# Patient Record
Sex: MALE | Race: BLACK OR AFRICAN AMERICAN | Employment: UNEMPLOYED | URBAN - METROPOLITAN AREA
[De-identification: names, ages, dates, MRNs, and addresses within clinical notes are randomized per-mention and may not be internally consistent; named-entity substitution may affect disease eponyms.]

---

## 2017-01-05 ENCOUNTER — TELEPHONE (OUTPATIENT)
Dept: INTERNAL MEDICINE CLINIC | Age: 57
End: 2017-01-05

## 2017-01-05 NOTE — TELEPHONE ENCOUNTER
Chief Complaint   Patient presents with    Appointment     patient reached and informed of upcoming follow up with Dr Nicole Fothergill on 01-10-17 at 8:30 am and to have his LABS Drawn 3-5 business days prior to the appointment to discuss results     01-05-17 at 10:43 am: Patient reached and states he had a death in the family and will need to reschedule his appointment, he will have his labs done prior to his rescheduled appointment. Patient was given our new office contact information/phone number, and understands all.

## 2017-01-20 ENCOUNTER — TELEPHONE (OUTPATIENT)
Dept: INTERNAL MEDICINE CLINIC | Age: 57
End: 2017-01-20

## 2017-01-20 ENCOUNTER — OFFICE VISIT (OUTPATIENT)
Dept: INTERNAL MEDICINE CLINIC | Age: 57
End: 2017-01-20

## 2017-01-20 VITALS
OXYGEN SATURATION: 99 % | WEIGHT: 257 LBS | SYSTOLIC BLOOD PRESSURE: 138 MMHG | RESPIRATION RATE: 20 BRPM | HEART RATE: 79 BPM | BODY MASS INDEX: 32.98 KG/M2 | DIASTOLIC BLOOD PRESSURE: 82 MMHG | HEIGHT: 74 IN | TEMPERATURE: 98.1 F

## 2017-01-20 DIAGNOSIS — E04.9 GOITER: ICD-10-CM

## 2017-01-20 DIAGNOSIS — B20 HIV (HUMAN IMMUNODEFICIENCY VIRUS INFECTION) (HCC): Primary | ICD-10-CM

## 2017-01-20 DIAGNOSIS — E78.5 HYPERLIPIDEMIA, UNSPECIFIED HYPERLIPIDEMIA TYPE: ICD-10-CM

## 2017-01-20 DIAGNOSIS — H40.9 GLAUCOMA, UNSPECIFIED GLAUCOMA, UNSPECIFIED LATERALITY: ICD-10-CM

## 2017-01-20 DIAGNOSIS — I10 ESSENTIAL HYPERTENSION: ICD-10-CM

## 2017-01-20 NOTE — MR AVS SNAPSHOT
Visit Information Date & Time Provider Department Dept. Phone Encounter #  
 1/20/2017  8:00 AM Stewart Burgess MD Internist of [address] [id number] Follow-up Instructions Return in about 4 months (around 5/20/2017) for BP check. Your Appointments 5/23/2017  8:00 AM  
Office Visit with Stewart Burgess MD  
Internist of [facility] [address] [facility]) Appt Note: 4 mth f/u  
 [address] [facility], [facility] [id number] [address]  
  
   
 [address] [facility] Upcoming Health Maintenance Date Due  
 MEDICARE YEARLY EXAM 8/8/1976 INFLUENZA AGE 9 TO ADULT 8/1/2016 COLONOSCOPY 8/12/2019 DTaP/Tdap/Td series (2 - Td) 10/17/2021 Allergies as of 1/20/2017  Review Complete On: 1/20/2017 By: Stewart Burgess MD  
 No Known Allergies Current Immunizations  Never Reviewed Name Date Influenza Vaccine 9/3/2014, 11/8/2012 Pneumococcal Conjugate (PCV-13) 6/10/2013 Pneumococcal Polysaccharide (PPSV-23) 1/22/2009 Pneumococcal Vaccine (Unspecified Type) 3/19/2014 Tdap 10/17/2011 Not reviewed this visit Vitals BP Pulse Temp Resp Height(growth percentile) Weight(growth percentile) 138/82 79 98.1 °F (36.7 °C) 20 6' 2\" (1.88 m) 257 lb (116.6 kg) SpO2 BMI Smoking Status 99% 33 kg/m2 Former Smoker BMI and BSA Data Body Mass Index Body Surface Area  
 33 kg/m 2 2.47 m 2 Preferred Pharmacy Pharmacy Name Phone [address] [facility] [address] [facility] [address] [phone] Your Updated Medication List  
  
   
This list is accurate as of: 1/20/17  8:50 AM.  Always use your most recent med list.  
  
  
  
  
 ascorbic acid (vitamin C) 500 mg tablet Commonly known as:  VITAMIN C Take 500 mg by mouth daily. brimonidine 0.1 % ophthalmic solution Commonly known as:  ALPHAGAN P  
every eight (8) hours. didanosine 400 mg capsule Commonly known as:  VIDEX EC Take 400 mg by mouth daily. ergocalciferol 50,000 unit capsule Commonly known as:  ERGOCALCIFEROL Take 50,000 Units by mouth every seven (7) days. ketoconazole 2 % shampoo Commonly known as:  NIZORAL Apply  to affected area daily as needed for Itching. lisinopril 20 mg tablet Commonly known as:  Shannon Pinch Take 1 Tab by mouth daily. mometasone 50 mcg/actuation nasal spray Commonly known as:  NASONEX  
2 Sprays daily. multivitamin tablet Commonly known as:  ONE A DAY Take 1 Tab by mouth daily. omega-3 fatty acids-vitamin e 1,000 mg Cap Take 1 Cap by mouth. OTHER Triumebq  
  
 polyethylene glycol 17 gram/dose powder Commonly known as:  Katie Floor TAKE AS DIRECTED BY OFFICE  
  
 rosuvastatin 5 mg tablet Commonly known as:  CRESTOR Take 5 mg by mouth nightly. tiZANidine 4 mg tablet Commonly known as:  Bunker Homans TAKE 1 TABLET THREE TIMES DAILY AS NEEDED FOR  MUSCLE  SPASM  
  
 triamcinolone acetonide 0.025 % lotion Apply  to affected area two (2) times a day. vitamin E 400 unit capsule Commonly known as:  [address] Take 400 Units by mouth daily. Follow-up Instructions Return in about 4 months (around 5/20/2017) for BP check. Patient Instructions Learning About Taking Medicine to Prevent HIV Infections Introduction HIV (human immunodeficiency virus) is a virus that attacks the immune system, the body's natural defense system. Without a strong immune system, the body has trouble fighting off disease. HIV is the virus that causes AIDS. Two common ways to get HIV are: 
· Having unprotected sex with someone who has HIV. · Sharing needles with someone who has HIV. These things put you at high risk for getting HIV.  If you are at risk, you and your doctor can decide if you can take medicines that may lower your risk. Taking these medicines is called pre-exposure prophylaxis (PrEP). How does PrEP work? PrEP can help prevent an HIV infection from taking hold and spreading in your body. Two medicines are combined in one pill called Truvada. You must take it on schedule for it to help protect you from HIV. PrEP works best if you take the medicine every day. It doesn't work well if you don't follow the daily schedule. Do not share your medicine with other people. You will have regular visits with your doctor. He or she will check to see how you are doing while taking the medicine. You'll be tested for HIV. Your doctor may also talk to you about other steps you can take to avoid HIV infection. These include practicing safer sex and not injecting illegal drugs with shared needles. What else should you know about PrEP? PrEP does not remove all risk of getting HIV. While you take the medicine, avoid risky actions like having unprotected sex and sharing needles. PrEP can help you have a baby safely when your partner has an HIV infection. It can help prevent the infection from spreading to you or your baby. Your doctor can discuss this and other options with you. If you are infected with HIV, your doctor may give you Truvada along with other medicine to treat HIV. Be safe with medicines. Take your medicines exactly as prescribed. Call your doctor if you think you are having a problem with your medicine. You may be able to pay less for PrEP medicines. Many health insurance plans cover the cost of PrEP. There are programs that provide PrEP for free or at a lower cost for people who need help paying for it. Follow-up care is a key part of your treatment and safety. Be sure to make and go to all appointments, and call your doctor if you are having problems. It's also a good idea to know your test results and keep a list of the medicines you take. Where can you learn more? Go to http://adela-mily.info/. Enter A222 in the search box to learn more about \"Learning About Taking Medicine to Prevent HIV Infections. \" Current as of: May 24, 2016 Content Version: 11.1 © 1011-1958 [employer]. Care instructions adapted under license by [employer] (which disclaims liability or warranty for this information). If you have questions about a medical condition or this instruction, always ask your healthcare professional. [address] any warranty or liability for your use of this information. Introducing [facility]! Argelia Garza introduces [employer] patient portal. Now you can access parts of your medical record, email your doctor's office, and request medication refills online. 1. In your internet browser, go to [email or web address] 2. Click on the First Time User? Click Here link in the Sign In box. You will see the New Member Sign Up page. 3. Enter your [employer] Access Code exactly as it appears below. You will not need to use this code after youve completed the sign-up process. If you do not sign up before the expiration date, you must request a new code. · [employer] Access Code: [email or web address] Expires: 4/20/2017  7:51 AM 
 
4. Enter the last four digits of your Social Security Number (xxxx) and Date of Birth (mm/dd/yyyy) as indicated and click Submit. You will be taken to the next sign-up page. 5. Create a [employer] ID. This will be your [employer] login ID and cannot be changed, so think of one that is secure and easy to remember. 6. Create a [employer] password. You can change your password at any time. 7. Enter your Password Reset Question and Answer. This can be used at a later time if you forget your password. 8. Enter your e-mail address. You will receive e-mail notification when new information is available in [address] 9. Click Sign Up. You can now view and download portions of your medical record. 10. Click the Download Summary menu link to download a portable copy of your medical information. If you have questions, please visit the Frequently Asked Questions section of the [employer] website. Remember, [employer] is NOT to be used for urgent needs. For medical emergencies, dial 911. Now available from your iPhone and Android! Please provide this summary of care documentation to your next provider. Your primary care clinician is listed as Severa Ramus. If you have any questions after today's visit, please call [phone].

## 2017-01-20 NOTE — TELEPHONE ENCOUNTER
Call from [facility], pt needs updated referral for his glaucoma exam with Dr. Abner Sandoval, his appmnt is 01/23/17, Sedrick Kumar

## 2017-01-20 NOTE — PATIENT INSTRUCTIONS
Learning About Taking Medicine to Prevent HIV Infections  Introduction  HIV (human immunodeficiency virus) is a virus that attacks the immune system, the body's natural defense system. Without a strong immune system, the body has trouble fighting off disease. HIV is the virus that causes AIDS. Two common ways to get HIV are:  · Having unprotected sex with someone who has HIV. · Sharing needles with someone who has HIV. These things put you at high risk for getting HIV. If you are at risk, you and your doctor can decide if you can take medicines that may lower your risk. Taking these medicines is called pre-exposure prophylaxis (PrEP). How does PrEP work? PrEP can help prevent an HIV infection from taking hold and spreading in your body. Two medicines are combined in one pill called Truvada. You must take it on schedule for it to help protect you from HIV. PrEP works best if you take the medicine every day. It doesn't work well if you don't follow the daily schedule. Do not share your medicine with other people. You will have regular visits with your doctor. He or she will check to see how you are doing while taking the medicine. You'll be tested for HIV. Your doctor may also talk to you about other steps you can take to avoid HIV infection. These include practicing safer sex and not injecting illegal drugs with shared needles. What else should you know about PrEP? PrEP does not remove all risk of getting HIV. While you take the medicine, avoid risky actions like having unprotected sex and sharing needles. PrEP can help you have a baby safely when your partner has an HIV infection. It can help prevent the infection from spreading to you or your baby. Your doctor can discuss this and other options with you. If you are infected with HIV, your doctor may give you Truvada along with other medicine to treat HIV. Be safe with medicines. Take your medicines exactly as prescribed.  Call your doctor if you think you are having a problem with your medicine. You may be able to pay less for PrEP medicines. Many health insurance plans cover the cost of PrEP. There are programs that provide PrEP for free or at a lower cost for people who need help paying for it. Follow-up care is a key part of your treatment and safety. Be sure to make and go to all appointments, and call your doctor if you are having problems. It's also a good idea to know your test results and keep a list of the medicines you take. Where can you learn more? Go to http://adela-mily.info/. Enter D239 in the search box to learn more about \"Learning About Taking Medicine to Prevent HIV Infections. \"  Current as of: May 24, 2016  Content Version: 11.1  © 5624-2944 [employer]. Care instructions adapted under license by [employer] (which disclaims liability or warranty for this information). If you have questions about a medical condition or this instruction, always ask your healthcare professional. [address] any warranty or liability for your use of this information.

## 2017-01-20 NOTE — PROGRESS NOTES
INTERNISTS OF [facility]:  1/22/2017, MRN: [id number]      Colin Gaming is a 60 y.o. male and presents to clinic for Hypertension (follow up)    Subjective:   1. HTN:   - On lisinopril   - Present >4 months   - No adverse effects from rx    2. HLD:   - On Crestor   - No adverse effects from rx    3. Goiter:   - Followed by Endocrinology team   - Last TSH was elevated. Pt's thyroid ultrasound from 2016 shows tiny thyroid benign appearing nodules. Lab Results   Component Value Date/Time    TSH 4.68 09/15/2016 09:02 AM       4. HIV:   - Followed by [facility] ID team   - Not sexually active   - Preferred sexual partner: male   - On ART, no adverse effects   - Per pt hx, last viral load was undetectable   - Pt has very few people in his life that know his sexual orientation. He fears discrimination from family and friends, as well as fellow Christians at his [employer] if he should ever disclose his sexual orientation. He prays for peace and is active in his [employer]. 5. 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 eye      Patient Active Problem List    Diagnosis Date Noted    Abnormal TSH 01/22/2017    Glaucoma 01/20/2017    Goiter- with thyroid nodules per U/S 01/20/2017    Essential hypertension 01/18/2016    HLD (hyperlipidemia) 01/18/2016    [facility] 01/18/2016       Current Outpatient Prescriptions   Medication Sig Dispense Refill    tiZANidine (ZANAFLEX) 4 mg tablet TAKE 1 TABLET THREE TIMES DAILY AS NEEDED FOR  MUSCLE  SPASM 90 Tab 0    polyethylene glycol (MIRALAX) 17 gram/dose powder TAKE AS DIRECTED BY OFFICE 255 g 0    brimonidine (ALPHAGAN P) 0.1 % ophthalmic solution every eight (8) hours.  lisinopril (PRINIVIL, ZESTRIL) 20 mg tablet Take 1 Tab by mouth daily. 90 Tab 3    OTHER Triumebq      rosuvastatin (CRESTOR) 5 mg tablet Take 5 mg by mouth nightly.       mometasone (NASONEX) 50 mcg/actuation nasal spray 2 Sprays daily.  triamcinolone acetonide 0.025 % lotion Apply  to affected area two (2) times a day.  ketoconazole (NIZORAL) 2 % shampoo Apply  to affected area daily as needed for Itching.  didanosine (VIDEX EC) 400 mg capsule Take 400 mg by mouth daily.  omega-3 fatty acids-vitamin e 1,000 mg cap Take 1 Cap by mouth.  multivitamin (ONE A DAY) tablet Take 1 Tab by mouth daily.  ascorbic acid (VITAMIN C) 500 mg tablet Take 500 mg by mouth daily.  ergocalciferol (ERGOCALCIFEROL) 50,000 unit capsule Take 50,000 Units by mouth every seven (7) days.  vitamin E (AQUA GEMS) 400 unit capsule Take 400 Units by mouth daily. No Known Allergies    Past Medical History   Diagnosis Date    Arthritis      established with Dr. Elizabeth Kelley chiropractor    Autoimmune disease ([facility]     Blindness      left eye; and periperal vision is absent in [facility] Consultant Dr. Rafael Simon HIV (human immunodeficiency virus infection) [facility]) dx [facility]; viral loads undetectable for 13 yrs    Hypercholesterolemia     Hypertension        Past Surgical History   Procedure Laterality Date    Hx colonoscopy       2011    Hx heent       eye surg for glaucoma    Colonoscopy N/A 8/12/2016     COLONOSCOPY [facility] Pugh [facility] ENDOSCOPY       Family History   Problem Relation Age of Onset    Family history unknown: Yes       Social History   Substance Use Topics    Smoking status: Former Smoker     Quit date: 7/8/2006    Smokeless tobacco: Never Used      Comment: smoked for 30 years: 1/2 ppd.  Alcohol use No       ROS   Review of Systems   Constitutional: Negative for chills and fever. HENT: Negative for ear pain and sore throat. Eyes: Negative for pain. Respiratory: Negative for cough and shortness of breath. Cardiovascular: Negative for chest pain.    Gastrointestinal: Negative for abdominal pain. Genitourinary: Negative for dysuria. Musculoskeletal: Negative for joint pain and myalgias. Skin: Negative for rash. Neurological: Negative for tingling, focal weakness and headaches. Endo/Heme/Allergies: Negative for environmental allergies. Does not bruise/bleed easily. Psychiatric/Behavioral: Negative for substance abuse. Objective     Vitals:    01/20/17 0802   BP: 138/82   Pulse: 79   Resp: 20   Temp: 98.1 °F (36.7 °C)   SpO2: 99%   Weight: 257 lb (116.6 kg)   Height: 6' 2\" (1.88 m)   PainSc:   0 - No pain       Physical Exam   Constitutional: He is oriented to person, place, and time and well-developed, well-nourished, and in no distress. HENT:   Head: Normocephalic and atraumatic. Right Ear: External ear normal.   Left Ear: External ear normal.   Nose: Nose normal.   Mouth/Throat: Oropharynx is clear and moist. No oropharyngeal exudate. Clear TMs   Eyes: Conjunctivae and EOM are normal. Pupils are equal, round, and reactive to light. Right eye exhibits no discharge. Left eye exhibits no discharge. No scleral icterus. Neck: Neck supple. Cardiovascular: Normal rate, normal heart sounds and intact distal pulses. Pulmonary/Chest: Effort normal and breath sounds normal. No respiratory distress. He has no wheezes. He has no rales. Abdominal: Soft. Bowel sounds are normal. He exhibits no distension. There is no tenderness. There is no rebound and no guarding. Musculoskeletal: He exhibits no edema or tenderness (BUE are NTTP). Lymphadenopathy:     He has no cervical adenopathy. Neurological: He is alert and oriented to person, place, and time. He exhibits normal muscle tone. Skin: Skin is warm and dry. No erythema. Psychiatric: Affect normal.   Nursing note and vitals reviewed.       LABS   Data Review:   Lab Results   Component Value Date/Time    WBC 5.0 09/15/2016 09:02 AM    HGB 14.5 09/15/2016 09:02 AM    HCT 44.7 09/15/2016 09:02 AM    PLATELET 205 09/15/2016 09:02 AM    MCV 95 09/15/2016 09:02 AM       Lab Results   Component Value Date/Time    Sodium 141 09/15/2016 09:02 AM    Potassium 4.6 09/15/2016 09:02 AM    Chloride 99 09/15/2016 09:02 AM    CO2 29 09/15/2016 09:02 AM    Anion gap 13.0 09/15/2016 09:02 AM    Glucose 106 09/15/2016 09:02 AM    BUN 14 09/15/2016 09:02 AM    Creatinine 1.1 09/15/2016 09:02 AM    Calcium 9.5 09/15/2016 09:02 AM       Lab Results   Component Value Date/Time    Cholesterol, total 159 09/15/2016 09:02 AM    HDL Cholesterol 49 09/15/2016 09:02 AM    LDL, calculated 91 09/15/2016 09:02 AM    VLDL, calculated 18 09/15/2016 09:02 AM    Triglyceride 91 09/15/2016 09:02 AM       Lab Results   Component Value Date/Time    Hemoglobin A1c, External 6.1 09/04/2014 05:04 AM       Assessment/Plan:   1. HIV: stable. - C/w ART and f/u with [facility] ID team. Requesting their most recent records and lab work   - Encouraged PreP if pt should become sexually active. Counseled pt on PreP    2. HTN: BP is at goal.   - C/w lisinopril    3. Goiter: Stable. - Encouraged f/u with Endocrinology team    4. HLD: Stable. - C/w statin as prescribed. 5. Glaucoma:   - C/w regular eye exam and rx      Health Maintenance Due   Topic Date Due    MEDICARE YEARLY EXAM  08/08/1978    INFLUENZA AGE 9 TO ADULT  08/01/2016     Lab review: labs are reviewed in EHR    I have discussed the diagnosis with the patient and the intended plan as seen in the above orders. The patient has received an after-visit summary and questions were answered concerning future plans. I have discussed medication side effects and warnings with the patient as well. I have reviewed the plan of care with the patient, accepted their input and they are in agreement with the treatment goals. All questions were answered. The patient understands the plan of care. Handouts provided today with above information.  Pt instructed if symptoms worsen to call the office or report to the ED for continued care. Greater than 50% of the visit time was spent in counseling and/or coordination of care. I spent 25 minutes with the pt for this encounter, 15 of which were spent counseling the pt as described above. Follow-up Disposition:  Return in about 4 months (around 5/20/2017) for BP check.     Alex Luna MD

## 2017-01-22 PROBLEM — R79.89 ABNORMAL TSH: Status: ACTIVE | Noted: 2017-01-22

## 2017-01-23 PROBLEM — R73.02 IMPAIRED GLUCOSE TOLERANCE: Status: ACTIVE | Noted: 2017-01-23

## 2017-01-25 NOTE — TELEPHONE ENCOUNTER
There is already a referral in the system with an authorization for 99 visits. Martha Peralta documented it.

## 2017-02-02 DIAGNOSIS — R73.01 IMPAIRED FASTING GLUCOSE: ICD-10-CM

## 2017-02-02 DIAGNOSIS — R94.6 THYROID FUNCTION STUDY ABNORMALITY: ICD-10-CM

## 2017-02-02 DIAGNOSIS — B20 HIV (HUMAN IMMUNODEFICIENCY VIRUS INFECTION) (HCC): ICD-10-CM

## 2017-02-02 DIAGNOSIS — I10 ESSENTIAL HYPERTENSION WITH GOAL BLOOD PRESSURE LESS THAN 140/90: ICD-10-CM

## 2017-02-02 DIAGNOSIS — E78.5 HYPERLIPIDEMIA, UNSPECIFIED HYPERLIPIDEMIA TYPE: ICD-10-CM

## 2017-02-09 ENCOUNTER — DOCUMENTATION ONLY (OUTPATIENT)
Dept: INTERNAL MEDICINE CLINIC | Age: 57
End: 2017-02-09

## 2017-02-20 NOTE — PROGRESS NOTES
Received via fax release of information request from [facility] for [facility] of Immune Decifiency,  However, no time frame specified on release so faxed back to them to get completed.

## 2017-03-15 ENCOUNTER — TELEPHONE (OUTPATIENT)
Dept: INTERNAL MEDICINE CLINIC | Age: 57
End: 2017-03-15

## 2017-03-15 DIAGNOSIS — E78.5 HYPERLIPIDEMIA, UNSPECIFIED HYPERLIPIDEMIA TYPE: ICD-10-CM

## 2017-03-15 DIAGNOSIS — B20 HIV (HUMAN IMMUNODEFICIENCY VIRUS INFECTION) (HCC): ICD-10-CM

## 2017-03-15 DIAGNOSIS — I10 ESSENTIAL HYPERTENSION: ICD-10-CM

## 2017-03-15 DIAGNOSIS — M54.9 BACK PAIN, UNSPECIFIED BACK LOCATION, UNSPECIFIED BACK PAIN LATERALITY, UNSPECIFIED CHRONICITY: Primary | ICD-10-CM

## 2017-03-15 DIAGNOSIS — Z12.5 PROSTATE CANCER SCREENING: ICD-10-CM

## 2017-03-15 DIAGNOSIS — Z11.59 NEED FOR HEPATITIS C SCREENING TEST: ICD-10-CM

## 2017-03-15 RX ORDER — MOMETASONE FUROATE 50 UG/1
2 SPRAY, METERED NASAL DAILY
Qty: 3 CONTAINER | Refills: 3 | Status: SHIPPED | OUTPATIENT
Start: 2017-03-15 | End: 2018-04-12 | Stop reason: SDUPTHER

## 2017-03-15 RX ORDER — LISINOPRIL 20 MG/1
20 TABLET ORAL DAILY
Qty: 90 TAB | Refills: 3 | Status: SHIPPED | OUTPATIENT
Start: 2017-03-15 | End: 2017-12-12

## 2017-03-15 NOTE — TELEPHONE ENCOUNTER
Pt calling says he needs a referral and an authorization forms for     Dr. Wendy Gonzalez sees her for HIV  Phone [phone] appt is at end of month    Dr. Denny November for back pain [phone]    Not sure what authorization form is the patient says he normally got from Dr. Cameron Segura

## 2017-05-23 ENCOUNTER — OFFICE VISIT (OUTPATIENT)
Dept: INTERNAL MEDICINE CLINIC | Age: 57
End: 2017-05-23

## 2017-05-23 VITALS
WEIGHT: 250.2 LBS | TEMPERATURE: 97.8 F | HEIGHT: 74 IN | OXYGEN SATURATION: 98 % | BODY MASS INDEX: 32.11 KG/M2 | HEART RATE: 75 BPM | SYSTOLIC BLOOD PRESSURE: 135 MMHG | DIASTOLIC BLOOD PRESSURE: 75 MMHG | RESPIRATION RATE: 18 BRPM

## 2017-05-23 DIAGNOSIS — Z00.00 ROUTINE GENERAL MEDICAL EXAMINATION AT A HEALTH CARE FACILITY: ICD-10-CM

## 2017-05-23 DIAGNOSIS — R73.02 IMPAIRED GLUCOSE TOLERANCE: Primary | ICD-10-CM

## 2017-05-23 DIAGNOSIS — Z13.39 SCREENING FOR ALCOHOLISM: ICD-10-CM

## 2017-05-23 PROBLEM — D12.6 TUBULAR ADENOMA OF COLON: Status: ACTIVE | Noted: 2017-05-23

## 2017-05-23 RX ORDER — TIMOLOL MALEATE 5 MG/ML
1 SOLUTION/ DROPS OPHTHALMIC 2 TIMES DAILY
COMMUNITY

## 2017-05-23 NOTE — PATIENT INSTRUCTIONS
Medicare Part B Preventive Services Limitations Recommendation Scheduled   Bone Mass Measurement  (age 72 & older, biennial) Requires diagnosis related to osteoporosis or estrogen deficiency. Biennial benefit unless patient has history of long-term glucocorticoid tx or baseline is needed because initial test was by other method Not applicable    Cardiovascular Screening Blood Tests (every 5 years)  Total cholesterol, HDL, Triglycerides Order as a panel if possible We should check your cholesterol panel at least once every 5 years. Up to date   Colorectal Cancer Screening  -Fecal occult blood test (annual)  -Flexible sigmoidoscopy (5y)  -Screening colonoscopy (10y)  -Barium Enema  Due per your Gastroenterologist's recommendations. Up to date   Counseling to Prevent Tobacco Use (up to 8 sessions per year)  - Counseling greater than 3 and up to 10 minutes  - Counseling greater than 10 minutes Patients must be asymptomatic of tobacco-related conditions to receive as preventive service Continue with smoking cessation efforts. Diabetes Screening Tests (at least every 3 years, Medicare covers annually or at 6-month intervals for prediabetic patients)    Fasting blood sugar (FBS) or glucose tolerance test (GTT) Patient must be diagnosed with one of the following:  -Hypertension, Dyslipidemia, obesity, previous impaired FBS or GTT  Or any two of the following: overweight, FH of diabetes, age ? 72, history of gestational diabetes, birth of baby weighing more than 9 pounds Should be done yearly We will recheck your A1C to see if your prediabetes is progressing. Diabetes Self-Management Training (DSMT) (no USPSTF recommendation) Requires referral by treating physician for patient with diabetes or renal disease. 10 hours of initial DSMT session of no less than 30 minutes each in a continuous 12-month period. 2 hours of follow-up DSMT in subsequent years.  Not applicable    Glaucoma Screening (no USPSTF recommendation) Diabetes mellitus, family history, , age 48 or over,  American, age 72 or over Continue with annual eye exams. Not due   Human Immunodeficiency Virus (HIV) Screening (annually for increased risk patients)  HIV-1 and HIV-2 by EIA, ALETHA, rapid antibody test, or oral mucosa transudate Patient must be at increased risk for HIV infection per USPSTF guidelines or pregnant. Tests covered annually for patients at increased risk. Pregnant patients may receive up to 3 test during pregnancy. Continue to follow up with Infectious Disease team for management of underlying HIV    Medical Nutrition Therapy (MNT) (for diabetes or renal disease not recommended schedule) Requires referral by treating physician for patient with diabetes or renal disease. Can be provided in same year as diabetes self-management training (DSMT), and CMS recommends medical nutrition therapy take place after DSMT. Up to 3 hours for initial year and 2 hours in subsequent years. Not applicable    Shingles Vaccination A shingles vaccine is also recommended once in a lifetime after age 61 Vaccination recommended for shingles vaccination once after age 61 Not over-due   Seasonal Influenza Vaccination (annually)  Continue with yearly \"flu\" shot annually Up to date   Pneumococcal Vaccination (once after 72)  Please receive this vaccination at age 72. Up to date   Hepatitis B Vaccinations (if medium/high risk) Medium/high risk factors:  End-stage renal disease,  Hemophiliacs who received Factor VIII or IX concentrates, Clients of institutions for the mentally retarded, Persons who live in the same house as a HepB virus carrier, Homosexual men, Illicit injectable drug abusers.  Not applicable    Screening Mammography (biennial age 54-69) Annually (age 36 or over) Not applicable    Screening Pap Tests and Pelvic Examination (up to age 79 and after 79 if unknown history or abnormal study last 10 years) Every 24 months except high risk Not applicable    Ultrasound Screening for Abdominal Aortic Aneurysm (AAA) (once) Patient must be referred through IPPE and not have had a screening for abdominal aortic aneurysm before under Medicare. Limited to patients who meet one of the following criteria:  - Men who are 73-68 years old and have smoked more than 100 cigarettes in their lifetime.  -Anyone with a FH of AAA  -Anyone recommended for screening by USPSTF Recommended once after age 72 if you have smoked cigarettes. Prediabetes: Care Instructions  Your Care Instructions  Prediabetes is a warning sign that you are at risk for getting type 2 diabetes. It means that your blood sugar is higher than it should be. The food you eat turns into sugar, which your body uses for energy. Normally, an organ called the pancreas makes insulin, which allows the sugar in your blood to get into your body's cells. But when your body can't use insulin the right way, the sugar doesn't move into cells. It stays in your blood instead. This is called insulin resistance. The buildup of sugar in the blood causes prediabetes. The good news is that lifestyle changes may help you get your blood sugar back to normal and help you avoid or delay diabetes. Follow-up care is a key part of your treatment and safety. Be sure to make and go to all appointments, and call your doctor if you are having problems. It's also a good idea to know your test results and keep a list of the medicines you take. How can you care for yourself at home? · Watch your weight. A healthy weight helps your body use insulin properly. · Limit the amount of calories, sweets, and unhealthy fat you eat. Ask your doctor if you should see a dietitian. A registered dietitian can help you create meal plans that fit your lifestyle. · Get at least 30 minutes of exercise on most days of the week. Exercise helps control your blood sugar. It also helps you maintain a healthy weight. Walking is a good choice. You also may want to do other activities, such as running, swimming, cycling, or playing tennis or team sports. · Do not smoke. Smoking can make prediabetes worse. If you need help quitting, talk to your doctor about stop-smoking programs and medicines. These can increase your chances of quitting for good. · If your doctor prescribed medicines, take them exactly as prescribed. Call your doctor if you think you are having a problem with your medicine. You will get more details on the specific medicines your doctor prescribes. When should you call for help? Watch closely for changes in your health, and be sure to contact your doctor if:  · You have any symptoms of diabetes. These may include:  ¨ Being thirsty more often. ¨ Urinating more. ¨ Being hungrier. ¨ Losing weight. ¨ Being very tired. ¨ Having blurry vision. · You have a wound that will not heal.  · You have an infection that will not go away. · You have problems with your blood pressure. · You want more information about diabetes and how you can keep from getting it. Where can you learn more? Go to http://adela-mily.info/. Enter I222 in the search box to learn more about \"Prediabetes: Care Instructions. \"  Current as of: May 23, 2016  Content Version: 11.2  © 8994-3886 [employer]. Care instructions adapted under license by [employer] (which disclaims liability or warranty for this information). If you have questions about a medical condition or this instruction, always ask your healthcare professional. [place] [address] any warranty or liability for your use of this information. Patient was given a copy of the Advanced Directive form and understands to bring it in once completed.   Health Maintenance Due   Topic Date Due    MEDICARE YEARLY EXAM  08/08/1978

## 2017-05-23 NOTE — PROGRESS NOTES
Chief Complaint   Patient presents with    Hypertension    Cholesterol Problem    Labs     done 02-02-17 to discuss     Patient was given a copy of the Advanced Directive form and understands to bring it in once completed. 1. Have you been to the ER, urgent care clinic since your last visit? Hospitalized since your last visit? No    2. Have you seen or consulted any other health care providers outside of the [address] [place] [address] since your last visit? Include any pap smears or colon screening.  No

## 2017-05-23 NOTE — PROGRESS NOTES
[facility]:  5/23/2017, MRN: [id number]      Toni Chisholm is a 60 y.o. male and presents to clinic for No chief complaint on file. Subjective: The patient is a 28-year-old male with history of a tubular adenomatous colon polyp in June 2011, prediabetes, abnormal TSH, glaucoma, goiter with thyroid nodules (followed by Endocrinology team), hypertension, hyperlipidemia, and HIV (followed by [facility]). Prediabetes: ***        Patient Active Problem List    Diagnosis Date Noted    Tubular adenoma of colon 6/22/11 05/23/2017    Impaired glucose tolerance, A1C 6.4 1/17/17 01/23/2017    Abnormal TSH 01/22/2017    Glaucoma 01/20/2017    Goiter- with thyroid nodules per U/S 01/20/2017    Essential hypertension 01/18/2016    HLD (hyperlipidemia) 01/18/2016    HIV (human immunodeficiency virus infection) ([facility] [address] 01/18/2016       Current Outpatient Prescriptions   Medication Sig Dispense Refill    lisinopril (PRINIVIL, ZESTRIL) 20 mg tablet Take 1 Tab by mouth daily. 90 Tab 3    mometasone (NASONEX) 50 mcg/actuation nasal spray 2 Sprays by Both Nostrils route daily. 3 Container 3    tiZANidine (ZANAFLEX) 4 mg tablet TAKE 1 TABLET THREE TIMES DAILY AS NEEDED FOR  MUSCLE  SPASM 90 Tab 0    polyethylene glycol (MIRALAX) 17 gram/dose powder TAKE AS DIRECTED BY OFFICE 255 g 0    brimonidine (ALPHAGAN P) 0.1 % ophthalmic solution every eight (8) hours.  OTHER Triumebq      rosuvastatin (CRESTOR) 5 mg tablet Take 5 mg by mouth nightly.  triamcinolone acetonide 0.025 % lotion Apply  to affected area two (2) times a day.  ketoconazole (NIZORAL) 2 % shampoo Apply  to affected area daily as needed for Itching.  didanosine (VIDEX EC) 400 mg capsule Take 400 mg by mouth daily.  omega-3 fatty acids-vitamin e 1,000 mg cap Take 1 Cap by mouth.  multivitamin (ONE A DAY) tablet Take 1 Tab by mouth daily.  vitamin E (AQUA GEMS) 400 unit capsule Take 400 Units by mouth daily.  ascorbic acid (VITAMIN C) 500 mg tablet Take 500 mg by mouth daily.  ergocalciferol (ERGOCALCIFEROL) 50,000 unit capsule Take 50,000 Units by mouth every seven (7) days. No Known Allergies    Past Medical History:   Diagnosis Date    Arthritis     established with Dr. Mikhail Juárez chiropractor    Autoimmune disease ([facility] [address]     Blindness     left eye; and periperal vision is absent in [place] Consultant Dr. Ochoa Jeronimo HIV (human immunodeficiency virus infection) [facility]) dx Keerthi Danger Ms. Ramos; viral loads undetectable for 13 yrs    Hypercholesterolemia     Hypertension        Past Surgical History:   Procedure Laterality Date    COLONOSCOPY N/A 8/12/2016    COLONOSCOPY performed by Bernice Salcido MD at [facility] COLONOSCOPY      2011    HX HEENT      eye surg for glaucoma       Family History   Problem Relation Age of Onset    Family history unknown: Yes       Social History   Substance Use Topics    Smoking status: Former Smoker     Quit date: 7/8/2006    Smokeless tobacco: Never Used      Comment: smoked for 30 years: 1/2 ppd.  Alcohol use No       ROS   ROS    Objective     There were no vitals filed for this visit.     Physical Exam    LABS   Data Review:   Lab Results   Component Value Date/Time    WBC 5.0 09/15/2016 09:02 AM    HGB 14.5 09/15/2016 09:02 AM    HCT 44.7 09/15/2016 09:02 AM    PLATELET 107 49/90/4550 09:02 AM    MCV 95 09/15/2016 09:02 AM       Lab Results   Component Value Date/Time    Sodium 141 09/15/2016 09:02 AM    Potassium 4.6 09/15/2016 09:02 AM    Chloride 99 09/15/2016 09:02 AM    CO2 29 09/15/2016 09:02 AM    Anion gap 13.0 09/15/2016 09:02 AM    Glucose 106 09/15/2016 09:02 AM    BUN 14 09/15/2016 09:02 AM    Creatinine 1.1 09/15/2016 09:02 AM    Calcium 9.5 09/15/2016 09:02 AM       Lab Results   Component Value Date/Time    Cholesterol, total 159 09/15/2016 09:02 AM    HDL Cholesterol 49 09/15/2016 09:02 AM    LDL, calculated 91 09/15/2016 09:02 AM    VLDL, calculated 18 09/15/2016 09:02 AM    Triglyceride 91 09/15/2016 09:02 AM       Lab Results   Component Value Date/Time    Hemoglobin A1c, External 6.4 2017       Assessment/Plan:   There are no diagnoses linked to this encounter. Health Maintenance Due   Topic Date Due    MEDICARE YEARLY EXAM  1978         Lab review: {lab reviewed:207056}    I have discussed the diagnosis with the patient and the intended plan as seen in the above orders. The patient has received an after-visit summary and questions were answered concerning future plans. I have discussed medication side effects and warnings with the patient as well. I have reviewed the plan of care with the patient, accepted their input and they are in agreement with the treatment goals. All questions were answered. The patient understands the plan of care. Handouts provided today with above information. ***Pt instructed if symptoms worsen to call the office or report to the ED for continued care. ***Greater than 50% of the visit time was spent in counseling and/or coordination of care. ***The patient was counseled on the dangers of tobacco use, and was {MU SMOKING CESSATION COUNSELIN::\"advised to quit\"}. Reviewed strategies to maximize success, including {techniques:}. 3-10 minutes were spent on smoking/tobacco cessation      Follow-up Disposition: Not on File    Yesenia Sorto MD

## 2017-05-23 NOTE — ACP (ADVANCE CARE PLANNING)
Advance Care Planning  Advance Care Planning (ACP) Provider Conversation     Date of ACP Conversation: 05/23/17  Persons included in Conversation:  patient    Authorized Decision Maker (if patient is incapable of making informed decisions): This person is: He would like his POA to be [employer]. For Patients with Decision Making Capacity:   Values/Goals: Exploration of values, goals, and preferences if recovery is not expected, even with continued medical treatment in the event of:  Imminent death  Severe, permanent brain injury    Conversation Outcomes / Follow-Up Plan:   Recommended completion of Advance Directive form after review of ACP materials and conversation with prospective healthcare agent . Advanced Directive in the case that an injury or illness causes the patient to be unable to make health care decisions was discussed with the patient. The patient has a will and which he has appointed a power of  but does not a scanned copy in his EHR. He made it very clear he would like his friend from [facility], [employer], his [place]. This gentleman is his power of  per other documentation. We discussed end-of-life care. We discussed topics in the advance care directive form. All questions were answered. The patient is very clear as to who he would like notified of his medical status in the event that he is unable to speak for himself. Given his HIV status, he only wants Connor Zamarripa to be notified.       Dr. Shanita Vela  [facility] [address]  Phone: [phone]  Fax: [phone]

## 2017-05-23 NOTE — PROGRESS NOTES
INTERNISTS OF [facility]:  5/23/2017, MRN: [id number]      Donna Obrien is a 60 y.o. male and presents to clinic for Hypertension; Cholesterol Problem; and Labs (done 02-02-17 to discuss)    Subjective: The patient is a 58-year-old male with history of a tubular adenomatous colon polyp in June 2011, prediabetes, abnormal TSH, glaucoma, goiter with thyroid nodules (followed by Endocrinology team), hypertension, hyperlipidemia, and HIV (followed by [facility]). Prediabetes: His A1C in January of 2017 measured 6.4. He lost 7lb by walking regularly on the treadmill. He is eating a lot more fruits and vegetables. He is not consuming a lot of red meat. He occasionally has had some right knee pain with walking on the treadmill. He does not do any aggressive weightlifting. Wt Readings from Last 3 Encounters:   05/23/17 250 lb 3.2 oz (113.5 kg)   01/20/17 257 lb (116.6 kg)   09/22/16 256 lb 3.2 oz (116.2 kg)       Patient Active Problem List    Diagnosis Date Noted    Tubular adenoma of colon 6/22/11 05/23/2017    Impaired glucose tolerance, A1C 6.4 1/17/17 01/23/2017    Abnormal TSH 01/22/2017    Glaucoma 01/20/2017    Goiter- with thyroid nodules per U/S 01/20/2017    Essential hypertension 01/18/2016    HLD (hyperlipidemia) 01/18/2016    [facility] 01/18/2016       Current Outpatient Prescriptions   Medication Sig Dispense Refill    timolol (TIMOPTIC) 0.5 % ophthalmic solution Administer 1 Drop to both eyes two (2) times a day.  lisinopril (PRINIVIL, ZESTRIL) 20 mg tablet Take 1 Tab by mouth daily. 90 Tab 3    mometasone (NASONEX) 50 mcg/actuation nasal spray 2 Sprays by Both Nostrils route daily. 3 Container 3    tiZANidine (ZANAFLEX) 4 mg tablet TAKE 1 TABLET THREE TIMES DAILY AS NEEDED FOR  MUSCLE  SPASM 90 Tab 0    brimonidine (ALPHAGAN P) 0.1 % ophthalmic solution every eight (8) hours.  OTHER Take  by mouth daily.  Triumebq       rosuvastatin (CRESTOR) 5 mg tablet Take 5 mg by mouth nightly.  triamcinolone acetonide 0.025 % lotion Apply  to affected area two (2) times a day.  ketoconazole (NIZORAL) 2 % shampoo Apply  to affected area daily as needed for Itching.  didanosine (VIDEX EC) 400 mg capsule Take 400 mg by mouth daily.  omega-3 fatty acids-vitamin e 1,000 mg cap Take 1 Cap by mouth.  multivitamin (ONE A DAY) tablet Take 1 Tab by mouth daily.  ascorbic acid (VITAMIN C) 500 mg tablet Take 500 mg by mouth daily.  ergocalciferol (ERGOCALCIFEROL) 50,000 unit capsule Take 50,000 Units by mouth every seven (7) days. No Known Allergies    Past Medical History:   Diagnosis Date    Arthritis     established with Dr. Cori Danielson chiropractor    Autoimmune disease ([facility]     Blindness     left eye; and periperal vision is absent in [facility] Consultant Dr. Elba Jerome HIV (human immunodeficiency virus infection) [facility]) dx [place] Chandana Ms. Ramos; viral loads undetectable for 13 yrs    Hypercholesterolemia     Hypertension        Past Surgical History:   Procedure Laterality Date    COLONOSCOPY N/A 8/12/2016    COLONOSCOPY performed by Eve Saavedra MD at [facility] HX COLONOSCOPY      2011    HX HEENT      eye surg for glaucoma       Family History   Problem Relation Age of Onset    Family history unknown: Yes       Social History   Substance Use Topics    Smoking status: Former Smoker     Quit date: 7/8/2006    Smokeless tobacco: Never Used      Comment: smoked for 30 years: 1/2 ppd.  Alcohol use No       ROS   Review of Systems   Constitutional: Positive for weight loss. Negative for chills and fever. HENT: Negative for ear pain and sore throat. Eyes: Positive for blurred vision (chronic). Negative for pain. Respiratory: Negative for cough and shortness of breath. Cardiovascular: Negative for chest pain.    Gastrointestinal: Negative for abdominal pain. Genitourinary: Negative for dysuria. Musculoskeletal: Positive for joint pain (off/on). Negative for myalgias. Skin: Negative for rash. Neurological: Negative for focal weakness and headaches. Endo/Heme/Allergies: Does not bruise/bleed easily. Psychiatric/Behavioral: Negative for substance abuse. Objective     Vitals:    05/23/17 0817   BP: 135/75   Pulse: 75   Resp: 18   Temp: 97.8 °F (36.6 °C)   TempSrc: Oral   SpO2: 98%   Weight: 250 lb 3.2 oz (113.5 kg)   Height: 6' 2\" (1.88 m)   PainSc:   0 - No pain       Physical Exam   Constitutional: He is oriented to person, place, and time and well-developed, well-nourished, and in no distress. HENT:   Head: Normocephalic and atraumatic. Right Ear: External ear normal.   Nose: Nose normal.   Mouth/Throat: Oropharynx is clear and moist. No oropharyngeal exudate. Eyes: Right eye exhibits no discharge. Left eye exhibits no discharge. No scleral icterus. Neck: Neck supple. Cardiovascular: Normal rate, regular rhythm, normal heart sounds and intact distal pulses. Pulmonary/Chest: Effort normal and breath sounds normal. No respiratory distress. He has no wheezes. He has no rales. Abdominal: Soft. Bowel sounds are normal. He exhibits no distension. There is no tenderness. There is no rebound and no guarding. Musculoskeletal: He exhibits no edema or tenderness (BUE are NTTP). +Crepitus with flexion and extension of bilateral knee joints. Lymphadenopathy:     He has no cervical adenopathy. Neurological: He is alert and oriented to person, place, and time. He exhibits normal muscle tone. Gait normal.   Skin: Skin is warm and dry. No erythema. Psychiatric: Affect normal.   Nursing note and vitals reviewed.       LABS   Data Review:   Lab Results   Component Value Date/Time    WBC 5.0 09/15/2016 09:02 AM    HGB 14.5 09/15/2016 09:02 AM    HCT 44.7 09/15/2016 09:02 AM    PLATELET 363 01/38/3065 09:02 AM    MCV 95 09/15/2016 09:02 AM       Lab Results   Component Value Date/Time    Sodium 141 09/15/2016 09:02 AM    Potassium 4.6 09/15/2016 09:02 AM    Chloride 99 09/15/2016 09:02 AM    CO2 29 09/15/2016 09:02 AM    Anion gap 13.0 09/15/2016 09:02 AM    Glucose 106 09/15/2016 09:02 AM    BUN 14 09/15/2016 09:02 AM    Creatinine 1.1 09/15/2016 09:02 AM    Calcium 9.5 09/15/2016 09:02 AM       Lab Results   Component Value Date/Time    Cholesterol, total 159 09/15/2016 09:02 AM    HDL Cholesterol 49 09/15/2016 09:02 AM    LDL, calculated 91 09/15/2016 09:02 AM    VLDL, calculated 18 09/15/2016 09:02 AM    Triglyceride 91 09/15/2016 09:02 AM       Lab Results   Component Value Date/Time    Hemoglobin A1c, External 6.4 01/17/2017       Assessment/Plan:   Impaired glucose tolerance, A1C 6.4 1/17/17:   I counseled patient on ways to maintain a low-carb diet and prevent progression to type 2 diabetes in addition to losing weight. We discussed how caloric intake can be down and allow weight loss but if his diet is still high in carbs, he can progress to type 2 diabetes. We will check a hemoglobin A1c today. Lab review: orders written for new lab studies as appropriate; see orders. Labs were reviewed in the EHR    I have discussed the diagnosis with the patient and the intended plan as seen in the above orders. The patient has received an after-visit summary and questions were answered concerning future plans. I have discussed medication side effects and warnings with the patient as well. I have reviewed the plan of care with the patient, accepted their input and they are in agreement with the treatment goals. All questions were answered. The patient understands the plan of care. Handouts provided today with above information. Pt instructed if symptoms worsen to call the office or report to the ED for continued care. Greater than 50% of the visit time was spent in counseling and/or coordination of care.       Follow-up Disposition:  Return in about 6 months (around 11/23/2017) for BP check. MEDICARE ANNUAL WELLNESS VISIT/EXAM   INTERNISTS OF [facility]:  05/23/17, [id number]      [facility] PROGRESS NOTE    I have reviewed the patient's medical history in detail and updated the computerized patient record. Uyen Steiner is a 60 y.o.  male and presents for an annual wellness exam.    Subjective:   Health Maintenance History:  Immunizations reviewed: Tdap up to date   Pneumovax: up to date   Flu: up to date  Zoster: Not due    Immunization History   Administered Date(s) Administered    Influenza Vaccine 11/08/2012, 09/03/2014    Pneumococcal Conjugate (PCV-13) 06/10/2013    Pneumococcal Polysaccharide (PPSV-23) 01/22/2009    Pneumococcal Vaccine (Unspecified Type) 03/19/2014    Tdap 10/17/2011     Colonoscopy: up to date, last one was in 2016     Eye exam: up to date, pt has glaucoma and is followed by an Ophthalmologist.      Past Medical History:   Diagnosis Date    Arthritis     established with Dr. Kel Su chiropractor    Autoimmune disease ([facility]     Blindness     left eye; and periperal vision is absent in [facility] Consultant Dr. Kizzy Swenson HIV (human immunodeficiency virus infection) [facility]) dx [place] Card Ms. Ramos; viral loads undetectable for 13 yrs    Hypercholesterolemia     Hypertension         Past Surgical History:   Procedure Laterality Date    COLONOSCOPY N/A 8/12/2016    COL[facility]ji [facility]ale [facility] at [facility]OSCOPY    HX COLONOSCOPY      2011    HX HEENT      eye surg for glaucoma       Current Outpatient Prescriptions   Medication Sig Dispense Refill    timolol (TIMOPTIC) 0.5 % ophthalmic solution Administer 1 Drop to both eyes two (2) times a day.  lisinopril (PRINIVIL, ZESTRIL) 20 mg tablet Take 1 Tab by mouth daily.  90 Tab 3    mometasone (NASONEX) 50 mcg/actuation nasal spray 2 Sprays by Both Nostrils route daily. 3 Container 3    tiZANidine (ZANAFLEX) 4 mg tablet TAKE 1 TABLET THREE TIMES DAILY AS NEEDED FOR  MUSCLE  SPASM 90 Tab 0    brimonidine (ALPHAGAN P) 0.1 % ophthalmic solution every eight (8) hours.  OTHER Take  by mouth daily. Triumebq       rosuvastatin (CRESTOR) 5 mg tablet Take 5 mg by mouth nightly.  triamcinolone acetonide 0.025 % lotion Apply  to affected area two (2) times a day.  ketoconazole (NIZORAL) 2 % shampoo Apply  to affected area daily as needed for Itching.  didanosine (VIDEX EC) 400 mg capsule Take 400 mg by mouth daily.  omega-3 fatty acids-vitamin e 1,000 mg cap Take 1 Cap by mouth.  multivitamin (ONE A DAY) tablet Take 1 Tab by mouth daily.  ascorbic acid (VITAMIN C) 500 mg tablet Take 500 mg by mouth daily.  ergocalciferol (ERGOCALCIFEROL) 50,000 unit capsule Take 50,000 Units by mouth every seven (7) days. No Known Allergies    Family History   Problem Relation Age of Onset    Family history unknown: Yes       Social History   Substance Use Topics    Smoking status: Former Smoker     Quit date: 7/8/2006    Smokeless tobacco: Never Used      Comment: smoked for 30 years: 1/2 ppd.  Alcohol use No       Patient Active Problem List   Diagnosis Code    Essential hypertension I10    HLD (hyperlipidemia) E78.5    HIV (human immunodeficiency virus infection) ([facility] Z21    Glaucoma H40.9    Goiter- with thyroid nodules per U/S E04.9    Abnormal TSH R79.89    Impaired glucose tolerance, A1C 6.4 1/17/17 R73.02    Tubular adenoma of colon 6/22/11 D12.6       Review of Systems   Constitutional: Positive for weight loss. Negative for chills and fever. HENT: Negative for ear pain and sore throat. Eyes: Positive for blurred vision (chronic). Negative for pain. Respiratory: Negative for cough and shortness of breath. Cardiovascular: Negative for chest pain. Gastrointestinal: Negative for abdominal pain. Genitourinary: Negative for dysuria. Musculoskeletal: Positive for joint pain (off/on). Negative for myalgias. Skin: Negative for rash. Neurological: Negative for focal weakness and headaches. Endo/Heme/Allergies: Does not bruise/bleed easily. Psychiatric/Behavioral: Negative for substance abuse. Depression Risk Factor Screening:    Patient Health Questionnaire (PHQ-2)   Over the last 2 weeks, how often have you been bothered by any of the following problems? · Little interest or pleasure in doing things? · Not at all. [0]  · Feeling down, depressed, or hopeless? · Not at all. [0]    Total Score: 0/6  PHQ-2 Assessment Scoring:   A score of 2 or more requires further screening with the PHQ-9    Alcohol Risk Factor Screening:   Men:   1. On any occasion during the past 3 months, have you had more than 4 drinks containing alcohol? no  2. Do you average more than 14 drinks per week? no    Tobacco Use Screening:     History   Smoking Status    Former Smoker    Quit date: 7/8/2006   Smokeless Tobacco    Never Used     Comment: smoked for 30 years: 1/2 ppd. Hearing Loss    none; no hearing loss    Activities of Daily Living   Self-care. The patient only needs assistance with driving due to poor vision. Requires assistance with: no ADLs    Fall Risk   He has had no falls in the past year. Abuse Screen   None; the patient feels safe in his home. Additional Examination Findings:  Vitals:    05/23/17 0817   BP: 135/75   Pulse: 75   Resp: 18   Temp: 97.8 °F (36.6 °C)   TempSrc: Oral   SpO2: 98%   Weight: 250 lb 3.2 oz (113.5 kg)   Height: 6' 2\" (1.88 m)   PainSc:   0 - No pain      Body mass index is 32.12 kg/(m^2). Evaluation of Cognitive Function:  Mood/affect: Euthymic  Appearance: Well-groomed  Family member/caregiver input: The patient is not accompanied by family member.     Physical Exam   Constitutional: He is oriented to person, place, and time and well-developed, well-nourished, and in no distress. HENT:   Head: Normocephalic and atraumatic. Right Ear: External ear normal.   Nose: Nose normal.   Mouth/Throat: Oropharynx is clear and moist. No oropharyngeal exudate. Eyes: Right eye exhibits no discharge. Left eye exhibits no discharge. No scleral icterus. Neck: Neck supple. Cardiovascular: Normal rate, regular rhythm, normal heart sounds and intact distal pulses. Pulmonary/Chest: Effort normal and breath sounds normal. No respiratory distress. He has no wheezes. He has no rales. Abdominal: Soft. Bowel sounds are normal. He exhibits no distension. There is no tenderness. There is no rebound and no guarding. Musculoskeletal: He exhibits no edema or tenderness (BUE are NTTP). +Crepitus with flexion and extension of bilateral knee joints. Lymphadenopathy:     He has no cervical adenopathy. Neurological: He is alert and oriented to person, place, and time. He exhibits normal muscle tone. Gait normal.   Skin: Skin is warm and dry. No erythema. Psychiatric: Affect normal.   Nursing note and vitals reviewed. Dementia Screen (Mini-Cog):   Three Item Recall: 3/3  Clock Drawing (ten past eleven) Exercise: Unremarkable clock drawing exercise      LABS   Data Review:   Lab Results   Component Value Date/Time    Sodium 141 09/15/2016 09:02 AM    Potassium 4.6 09/15/2016 09:02 AM    Chloride 99 09/15/2016 09:02 AM    CO2 29 09/15/2016 09:02 AM    Anion gap 13.0 09/15/2016 09:02 AM    Glucose 106 09/15/2016 09:02 AM    BUN 14 09/15/2016 09:02 AM    Creatinine 1.1 09/15/2016 09:02 AM    Calcium 9.5 09/15/2016 09:02 AM       Lab Results   Component Value Date/Time    WBC 5.0 09/15/2016 09:02 AM    HGB 14.5 09/15/2016 09:02 AM    HCT 44.7 09/15/2016 09:02 AM    PLATELET 913 28/11/6329 09:02 AM    MCV 95 09/15/2016 09:02 AM       Lab Results   Component Value Date/Time    Hemoglobin A1c, External 6.4 01/17/2017       Lab Results   Component Value Date/Time Cholesterol, total 159 09/15/2016 09:02 AM    HDL Cholesterol 49 09/15/2016 09:02 AM    LDL, calculated 91 09/15/2016 09:02 AM    VLDL, calculated 18 09/15/2016 09:02 AM    Triglyceride 91 09/15/2016 09:02 AM       Patient Care Team:  Sage Velazquez MD as PCP - General (Family Practice)  Renny Dee MD as Physician (Ophthalmology)  Karen Purvis RN as [address] (Internal Medicine)    End-of-life planning  Advanced Directive in the case that an injury or illness causes the patient to be unable to make health care decisions was discussed with the patient. The patient has a will and which he has appointed a power of  but does not a scanned copy in his EHR. He made it very clear he would like his friend from [facility], [address] [place] [address], [facility]. This gentleman is his power of  per other documentation. We discussed end-of-life care. We discussed topics in the advance care directive form. All questions were answered. The patient is very clear as to who he would like notified of his medical status in the event that he is unable to speak for himself. Given his HIV status, he only wants Connor Zamarripa to be notified. Advice/Referrals/Counselling/Plan:   Education and counseling provided:  Are appropriate based on today's review and evaluation  End-of-Life planning (with patient's consent)  Pneumococcal Vaccine  Influenza Vaccine  Colorectal cancer screening tests  Cardiovascular screening blood test  Diabetes screening test     Include in education list (weight loss, physical activity, smoking cessation, fall prevention, and nutrition)    ICD-10-CM ICD-9-CM    1. Impaired glucose tolerance, A1C 6.4 1/17/17 R73.02 790.22 HEMOGLOBIN A1C W/O EAG     reviewed diet, exercise and weight control  cardiovascular risk and specific lipid/LDL goals reviewed  reviewed medications and side effects in detail.   Brief written plan, checklist - health maintenance - given to patient. Assessment/Plan:    We discussed the need to complete an advanced care directive to be scanned into his EHR. Lab review: labs are reviewed; orders written for new lab studies as appropriate; see orders        Advance Care Planning  Advance Care Planning (ACP) Provider Conversation     Date of ACP Conversation: 05/23/17  Persons included in Conversation:  patient    Authorized Decision Maker (if patient is incapable of making informed decisions): This person is: He would like his POA to be [employer]. For Patients with Decision Making Capacity:   Values/Goals: Exploration of values, goals, and preferences if recovery is not expected, even with continued medical treatment in the event of:  Imminent death  Severe, permanent brain injury    Conversation Outcomes / Follow-Up Plan:   Recommended completion of Advance Directive form after review of ACP materials and conversation with prospective healthcare agent . Advanced Directive in the case that an injury or illness causes the patient to be unable to make health care decisions was discussed with the patient. The patient has a will and which he has appointed a power of  but does not a scanned copy in his EHR. He made it very clear he would like his friend from [facility], [employer], his [place]. This gentleman is his power of  per other documentation. We discussed end-of-life care. We discussed topics in the advance care directive form. All questions were answered. The patient is very clear as to who he would like notified of his medical status in the event that he is unable to speak for himself. Given his HIV status, he only wants Connor Zamarripa to be notified. I have discussed the diagnosis/diagnoses with the patient and the intended plan as seen in the above orders. The patient has received an after-visit summary and questions were answered concerning future plans.   I have discussed medication side effects and warnings with the patient as well. I have reviewed the plan of care with the patient, accepted their input and they are in agreement with the treatment goals. All questions were answered. Follow-up Disposition:  Return in about 6 months (around 11/23/2017) for BP check.

## 2017-05-23 NOTE — MR AVS SNAPSHOT
Visit Information Date & Time Provider Department Dept. Phone Encounter #  
 5/23/2017  8:00 AM Consuelo Harrell MD Internist of [address] [id number] Follow-up Instructions Return in about 6 months (around 11/23/2017) for BP check. Your Appointments 11/28/2017  9:00 AM  
Office Visit with Consuelo Harrell MD  
Internist of [address] [place] [address] Appt Note: ov 6mos. falcon [address], [address] [place] [address] [address] [place]  
  
   
 [address], [address] Upcoming Health Maintenance Date Due  
 MEDICARE YEARLY EXAM 8/8/1978 INFLUENZA AGE 9 TO ADULT 8/1/2017 COLONOSCOPY 8/12/2019 DTaP/Tdap/Td series (2 - Td) 10/17/2021 Allergies as of 5/23/2017  Review Complete On: 5/23/2017 By: Consuelo Harrell MD  
 No Known Allergies Current Immunizations  Never Reviewed Name Date Influenza Vaccine 9/3/2014, 11/8/2012 Pneumococcal Conjugate (PCV-13) 6/10/2013 Pneumococcal Polysaccharide (PPSV-23) 1/22/2009 Pneumococcal Vaccine (Unspecified Type) 3/19/2014 Tdap 10/17/2011 Not reviewed this visit You Were Diagnosed With   
  
 Codes Comments Impaired glucose tolerance    -  Primary ICD-10-CM: R73.02 
ICD-9-CM: 790.22 Vitals BP Pulse Temp Resp Height(growth percentile) Weight(growth percentile) 135/75 (BP 1 Location: Left arm, BP Patient Position: Sitting) 75 97.8 °F (36.6 °C) (Oral) 18 6' 2\" (1.88 m) 250 lb 3.2 oz (113.5 kg) SpO2 BMI Smoking Status 98% 32.12 kg/m2 Former Smoker BMI and BSA Data Body Mass Index Body Surface Area  
 32.12 kg/m 2 2.43 m 2 Preferred Pharmacy Pharmacy Name Phone [place] [id number] [facility] [address] [place] [address] [phone] Your Updated Medication List  
  
   
This list is accurate as of: 5/23/17  9:08 AM.  Always use your most recent med list.  
  
  
 ascorbic acid (vitamin C) 500 mg tablet Commonly known as:  VITAMIN C Take 500 mg by mouth daily. brimonidine 0.1 % ophthalmic solution Commonly known as:  ALPHAGAN P  
every eight (8) hours. didanosine 400 mg capsule Commonly known as:  VIDEX EC Take 400 mg by mouth daily. ergocalciferol 50,000 unit capsule Commonly known as:  ERGOCALCIFEROL Take 50,000 Units by mouth every seven (7) days. ketoconazole 2 % shampoo Commonly known as:  NIZORAL Apply  to affected area daily as needed for Itching. lisinopril 20 mg tablet Commonly known as:  Salvadore Dine Take 1 Tab by mouth daily. mometasone 50 mcg/actuation nasal spray Commonly known as:  NASONEX  
2 Sprays by Both Nostrils route daily. multivitamin tablet Commonly known as:  ONE A DAY Take 1 Tab by mouth daily. omega-3 fatty acids-vitamin e 1,000 mg Cap Take 1 Cap by mouth. OTHER Take  by mouth daily. Triumebq  
  
 rosuvastatin 5 mg tablet Commonly known as:  CRESTOR Take 5 mg by mouth nightly. timolol 0.5 % ophthalmic solution Commonly known as:  TIMOPTIC Administer 1 Drop to both eyes two (2) times a day. tiZANidine 4 mg tablet Commonly known as:  Lit Heading TAKE 1 TABLET THREE TIMES DAILY AS NEEDED FOR  MUSCLE  SPASM  
  
 triamcinolone acetonide 0.025 % lotion Apply  to affected area two (2) times a day. Follow-up Instructions Return in about 6 months (around 11/23/2017) for BP check. Patient Instructions Medicare Part B Preventive Services Limitations Recommendation Scheduled Bone Mass Measurement 
(age 72 & older, biennial) Requires diagnosis related to osteoporosis or estrogen deficiency. Biennial benefit unless patient has history of long-term glucocorticoid tx or baseline is needed because initial test was by other method Not applicable Cardiovascular Screening Blood Tests (every 5 years) Total cholesterol, HDL, Triglycerides Order as a panel if possible We should check your cholesterol panel at least once every 5 years. Up to date Colorectal Cancer Screening 
-Fecal occult blood test (annual) -Flexible sigmoidoscopy (5y) 
-Screening colonoscopy (10y) -Barium Enema  Due per your Gastroenterologist's recommendations. Up to date Counseling to Prevent Tobacco Use (up to 8 sessions per year) - Counseling greater than 3 and up to 10 minutes - Counseling greater than 10 minutes Patients must be asymptomatic of tobacco-related conditions to receive as preventive service Continue with smoking cessation efforts. Diabetes Screening Tests (at least every 3 years, Medicare covers annually or at 6-month intervals for prediabetic patients) Fasting blood sugar (FBS) or glucose tolerance test (GTT) Patient must be diagnosed with one of the following: 
-Hypertension, Dyslipidemia, obesity, previous impaired FBS or GTT 
Or any two of the following: overweight, FH of diabetes, age ? 72, history of gestational diabetes, birth of baby weighing more than 9 pounds Should be done yearly We will recheck your A1C to see if your prediabetes is progressing. Diabetes Self-Management Training (DSMT) (no USPSTF recommendation) Requires referral by treating physician for patient with diabetes or renal disease. 10 hours of initial DSMT session of no less than 30 minutes each in a continuous 12-month period. 2 hours of follow-up DSMT in subsequent years. Not applicable Glaucoma Screening (no USPSTF recommendation) Diabetes mellitus, family history, , age 48 or over,  American, age 72 or over Continue with annual eye exams. Not due Human Immunodeficiency Virus (HIV) Screening (annually for increased risk patients) HIV-1 and HIV-2 by EIA, ALETHA, rapid antibody test, or oral mucosa transudate Patient must be at increased risk for HIV infection per USPSTF guidelines or pregnant. Tests covered annually for patients at increased risk. Pregnant patients may receive up to 3 test during pregnancy. Continue to follow up with Infectious Disease team for management of underlying HIV Medical Nutrition Therapy (MNT) (for diabetes or renal disease not recommended schedule) Requires referral by treating physician for patient with diabetes or renal disease. Can be provided in same year as diabetes self-management training (DSMT), and CMS recommends medical nutrition therapy take place after DSMT. Up to 3 hours for initial year and 2 hours in subsequent years. Not applicable Shingles Vaccination A shingles vaccine is also recommended once in a lifetime after age 61 Vaccination recommended for shingles vaccination once after age 61 Not over-due Seasonal Influenza Vaccination (annually)  Continue with yearly \"flu\" shot annually Up to date Pneumococcal Vaccination (once after 65)  Please receive this vaccination at age 72. Up to date Hepatitis B Vaccinations (if medium/high risk) Medium/high risk factors:  End-stage renal disease, Hemophiliacs who received Factor VIII or IX concentrates, Clients of institutions for the mentally retarded, Persons who live in the same house as a HepB virus carrier, Homosexual men, Illicit injectable drug abusers. Not applicable Screening Mammography (biennial age 54-69) Annually (age 36 or over) Not applicable Screening Pap Tests and Pelvic Examination (up to age 79 and after 79 if unknown history or abnormal study last 10 years) Every 24 months except high risk Not applicable Ultrasound Screening for Abdominal Aortic Aneurysm (AAA) (once) Patient must be referred through IPPE and not have had a screening for abdominal aortic aneurysm before under Medicare. Limited to patients who meet one of the following criteria: 
- Men who are 73-68 years old and have smoked more than 100 cigarettes in their lifetime. -Anyone with a FH of AAA 
-Anyone recommended for screening by USPSTF Recommended once after age 72 if you have smoked cigarettes. Prediabetes: Care Instructions Your Care Instructions Prediabetes is a warning sign that you are at risk for getting type 2 diabetes. It means that your blood sugar is higher than it should be. The food you eat turns into sugar, which your body uses for energy. Normally, an organ called the pancreas makes insulin, which allows the sugar in your blood to get into your body's cells. But when your body can't use insulin the right way, the sugar doesn't move into cells. It stays in your blood instead. This is called insulin resistance. The buildup of sugar in the blood causes prediabetes. The good news is that lifestyle changes may help you get your blood sugar back to normal and help you avoid or delay diabetes. Follow-up care is a key part of your treatment and safety. Be sure to make and go to all appointments, and call your doctor if you are having problems. It's also a good idea to know your test results and keep a list of the medicines you take. How can you care for yourself at home? · Watch your weight. A healthy weight helps your body use insulin properly. · Limit the amount of calories, sweets, and unhealthy fat you eat. Ask your doctor if you should see a dietitian. A registered dietitian can help you create meal plans that fit your lifestyle. · Get at least 30 minutes of exercise on most days of the week. Exercise helps control your blood sugar. It also helps you maintain a healthy weight. Walking is a good choice. You also may want to do other activities, such as running, swimming, cycling, or playing tennis or team sports. · Do not smoke. Smoking can make prediabetes worse. If you need help quitting, talk to your doctor about stop-smoking programs and medicines. These can increase your chances of quitting for good. · If your doctor prescribed medicines, take them exactly as prescribed. Call your doctor if you think you are having a problem with your medicine. You will get more details on the specific medicines your doctor prescribes. When should you call for help? Watch closely for changes in your health, and be sure to contact your doctor if: 
· You have any symptoms of diabetes. These may include: ¨ Being thirsty more often. ¨ Urinating more. ¨ Being hungrier. ¨ Losing weight. ¨ Being very tired. ¨ Having blurry vision. · You have a wound that will not heal. 
· You have an infection that will not go away. · You have problems with your blood pressure. · You want more information about diabetes and how you can keep from getting it. Where can you learn more? Go to http://adela-mily.info/. Enter I222 in the search box to learn more about \"Prediabetes: Care Instructions. \" Current as of: May 23, 2016 Content Version: 11.2 © 6007-8760 [employer]. Care instructions adapted under license by [employer] (which disclaims liability or warranty for this information). If you have questions about a medical condition or this instruction, always ask your healthcare professional. [place] [address] any warranty or liability for your use of this information. Patient was given a copy of the Advanced Directive form and understands to bring it in once completed. Health Maintenance Due Topic Date Due  MEDICARE YEARLY EXAM  08/08/1976 Introducing [facility]! Helen Whiting introduces [facility] patient portal. Now you can access parts of your medical record, email your doctor's office, and request medication refills online. 1. In your internet browser, go to [email or web address] 2. Click on the First Time User? Click Here link in the Sign In box. You will see the New Member Sign Up page. 3. Enter your BRAIN Access Code exactly as it appears below. You will not need to use this code after youve completed the sign-up process. If you do not sign up before the expiration date, you must request a new code. · BRAIN Access Code: [email or web address] Expires: 8/21/2017  9:08 AM 
 
4. Enter the last four digits of your Social Security Number (xxxx) and Date of Birth (mm/dd/yyyy) as indicated and click Submit. You will be taken to the next sign-up page. 5. Create a Kogetot ID. This will be your BRAIN login ID and cannot be changed, so think of one that is secure and easy to remember. 6. Create a BRAIN password. You can change your password at any time. 7. Enter your Password Reset Question and Answer. This can be used at a later time if you forget your password. 8. Enter your e-mail address. You will receive e-mail notification when new information is available in [address]. 9. Click Sign Up. You can now view and download portions of your medical record. 10. Click the Download Summary menu link to download a portable copy of your medical information. If you have questions, please visit the Frequently Asked Questions section of the BRAIN website. Remember, BRAIN is NOT to be used for urgent needs. For medical emergencies, dial 911. Now available from your iPhone and Android! Please provide this summary of care documentation to your next provider. Your primary care clinician is listed as Mathew Naranjo. If you have any questions after today's visit, please call [phone].

## 2017-05-24 LAB — HBA1C MFR BLD: 6.8 % (ref 4.8–5.6)

## 2017-05-26 ENCOUNTER — TELEPHONE (OUTPATIENT)
Dept: INTERNAL MEDICINE CLINIC | Age: 57
End: 2017-05-26

## 2017-05-26 NOTE — TELEPHONE ENCOUNTER
Pt has newly diagnosed type 2 DM. A1C is 6.8. I will schedule him to come in for an apt to discuss rx options. We discussed lifestyle modification and he wants to aggressively do lifestyle modification in place of rx (btw: metformin does not interact with his ART).        Dr. Alcantar Or  [facility]ists of [address]  Phone: [phone]  Fax: [phone]

## 2017-05-30 NOTE — TELEPHONE ENCOUNTER
Called pt and left vm per prev message from Dr. Jessi Zafar, pt needs to be seen within the next 2 weeks

## 2017-06-06 ENCOUNTER — OFFICE VISIT (OUTPATIENT)
Dept: INTERNAL MEDICINE CLINIC | Age: 57
End: 2017-06-06

## 2017-06-06 VITALS
BODY MASS INDEX: 32.16 KG/M2 | RESPIRATION RATE: 20 BRPM | HEIGHT: 74 IN | TEMPERATURE: 98.1 F | DIASTOLIC BLOOD PRESSURE: 95 MMHG | OXYGEN SATURATION: 99 % | WEIGHT: 250.6 LBS | SYSTOLIC BLOOD PRESSURE: 168 MMHG | HEART RATE: 78 BPM

## 2017-06-06 DIAGNOSIS — E11.9 TYPE 2 DIABETES MELLITUS WITHOUT COMPLICATION, UNSPECIFIED LONG TERM INSULIN USE STATUS: Primary | ICD-10-CM

## 2017-06-06 NOTE — PROGRESS NOTES
Chief Complaint   Patient presents with    Diabetes     Type 2 follow up    Labs     done 5-23-17 to discuss     Patient still has a copy of the Advanced Directive form and understands to bring it in once completed. 1. Have you been to the ER, urgent care clinic since your last visit? Hospitalized since your last visit? No    2. Have you seen or consulted any other health care providers outside of the [address] [place] [address] since your last visit? Include any pap smears or colon screening.  No

## 2017-06-06 NOTE — PATIENT INSTRUCTIONS
There are no preventive care reminders to display for this patient. Patient still has a copy of the Advanced Directive form and understands to bring it in once completed. PLAN:  Whyte points we discussed today:  1. Your fasting (before breakfast) blood sugar goal is less than 140.    2. Your blood sugar goal 2 hours after you eat a meal is less than 180.    3. Your \"A1C\" measures your average blood sugar over the course of 3-4 months. Your goal is to keep your \"A1C\" lab test measurement, less than 7. Lab Results   Component Value Date/Time    Hemoglobin A1c 6.8 05/23/2017 09:00 AM    Hemoglobin A1c, External 6.4 01/17/2017     4. Eye exam: You should have a formal eye exam to screen for diabetic eye disease once a year    5. Vaccinations: You should have a tetanus vaccination every 10 years, a flu shot once every winter/fall season, and a pneumonia vaccination every 5 years    6. Do not skip meals. Eat small meals throughout the day. Try to limit carb/sugar intake by limiting: soda intake, pasta/rice/chips/pretzels/potato/bread/bagels/cookies/candy in your diet    7. Cholesterol: You need to have your cholesterol checked once a year    8. Kidney function blood/urine tests: You need to have a blood and urine test once a year to screen for diabetic kidney disease    9. Diabetes supplies: Let us know if you need any diabetes supplies          Learning About Tests When You Have Diabetes  Why do you need regular diabetes tests? Diabetes can be hard on your body if it's not well controlled. But having tests on a regular schedule can help you and your doctor find problems early, when it's easier to start managing them. What tests do you need? The tests you may have, how often you should have them, and the goals of the tests are:  A1c blood test. This test shows the average level of blood sugar over the past 2 to 3 months.  It helps your doctor see whether blood sugar levels have been staying within your target range.  · How often: Every 3 to 6 months  · Goal: A blood sugar level in your target range  Blood pressure test: This test measures the pressure of blood flow in the arteries. Controlling blood pressure can help prevent damage to nerves and blood vessels. · How often: Every 3 to 6 months  · Goal: A blood pressure level in your target range  Cholesterol test: This test measures the amount of a type of fat in the blood. It is common for people with diabetes to also have high cholesterol. Too much cholesterol in the blood can build up inside the blood vessels and raise the risk for heart attack and stroke. · How often: At the time of your diabetes diagnosis, and as often as your doctor recommends after that  · Goal: A cholesterol level in your target range  Albumin-creatinine ratio test: This test checks for kidney damage by looking for the protein albumin (say \"al-BYOO-navjot\") in the urine. Albumin is normally found in the blood. Kidney damage can let small amounts of it (microalbumin) leak into the urine. · How often: Once a year  · Goal: No protein in the urine  Blood creatinine test/estimated glomerular filtration (eGFR): The blood creatinine (say \"kjdc-CG-ve-neen\") level shows how well your kidneys are working. Creatinine is a waste product that muscles release into the blood. Blood creatinine is used to estimate the glomerular filtration rate. A high level of creatinine and/or a low eGFR may mean your kidneys are not working as well as they should. · How often: Once a year  · Goal: Normal level of creatinine in the blood. The eGFR goal is greater than 60 mL/min/1.73 m². Complete foot exam: The doctor checks for foot sores and whether any sensation has been lost.  · How often: Once a year  · Goal: Healthy feet with no foot ulcers or loss of feeling  Dental exam and cleaning: The dentist checks for gum disease and tooth decay. People with high blood sugar are more likely to have these problems.   · How often: Every 6 months  · Goal: Healthy teeth and gums  Complete eye exam: High blood sugar levels can damage the eyes. This exam is done by an ophthalmologist or optometrist. It includes a dilated eye exam. The exam shows whether there's damage to the back of the eye (diabetic retinopathy). · How often: Once a year. If you don't have any signs of diabetic retinopathy, your doctor may recommend an exam every 2 years. · Goal: No damage to the back of the eye  Thyroid-stimulating hormone (TSH) blood test: This test checks for thyroid disease. Too little thyroid hormone can cause some medicines (like insulin) to stay in the body longer. This can cause low blood sugar. You may be tested if you have high cholesterol or are a woman over 48years old. · How often: As part of your diabetes diagnosis, and as often as your doctor recommends after that  · Goal: Normal level of TSH in the blood  Follow-up care is a key part of your treatment and safety. Be sure to make and go to all appointments, and call your doctor if you are having problems. It's also a good idea to know your test results and keep a list of the medicines you take. Where can you learn more? Go to http://adela-mily.info/. Enter [id number] in the search box to learn more about \"Learning About Tests When You Have Diabetes. \"  Current as of: May 23, 2016  Content Version: 11.2  © 9327-7871 [employer]. Care instructions adapted under license by [employer] (which disclaims liability or warranty for this information). If you have questions about a medical condition or this instruction, always ask your healthcare professional. [address] any warranty or liability for your use of this information.

## 2017-06-06 NOTE — MR AVS SNAPSHOT
Visit Information Date & Time Provider Department Dept. Phone Encounter #  
 6/6/2017  3:30 PM Yesenia Sorto MD Internist of [address] [id number] Your Appointments 9/6/2017  9:00 AM  
Office Visit with Yesenia Sorto MD  
Internist of [address] [place] [address] [facility]) Appt Note: ov 6mos. falcon; ov 3mos. falcon [address], [address]  
  
   
 [address], [address] Upcoming Health Maintenance Date Due INFLUENZA AGE 9 TO ADULT 8/1/2017 MEDICARE YEARLY EXAM 5/24/2018 COLONOSCOPY 8/12/2019 DTaP/Tdap/Td series (2 - Td) 10/17/2021 Allergies as of 6/6/2017  Review Complete On: 6/6/2017 By: Yeison Barrios No Known Allergies Current Immunizations  Never Reviewed Name Date Influenza Vaccine 9/3/2014, 11/8/2012 Pneumococcal Conjugate (PCV-13) 6/10/2013 Pneumococcal Polysaccharide (PPSV-23) 1/22/2009 Pneumococcal Vaccine (Unspecified Type) 3/19/2014 Tdap 10/17/2011 Not reviewed this visit Vitals BP Pulse Temp Resp Height(growth percentile) Weight(growth percentile) (!) 168/95 (BP 1 Location: Right arm, BP Patient Position: Sitting) 78 98.1 °F (36.7 °C) (Oral) 20 6' 2\" (1.88 m) 250 lb 9.6 oz (113.7 kg) SpO2 BMI Smoking Status 99% 32.18 kg/m2 Former Smoker Vitals History BMI and BSA Data Body Mass Index Body Surface Area  
 32.18 kg/m 2 2.44 m 2 Preferred Pharmacy Pharmacy Name Phone [address] [facility] [address] [phone] Your Updated Medication List  
  
   
This list is accurate as of: 6/6/17  4:43 PM.  Always use your most recent med list.  
  
  
  
  
 ascorbic acid (vitamin C) 500 mg tablet Commonly known as:  VITAMIN C Take 500 mg by mouth daily. brimonidine 0.1 % ophthalmic solution Commonly known as:  ALPHAGAN P  
 every eight (8) hours. didanosine 400 mg capsule Commonly known as:  VIDEX EC Take 400 mg by mouth daily. ergocalciferol 50,000 unit capsule Commonly known as:  ERGOCALCIFEROL Take 50,000 Units by mouth every seven (7) days. ketoconazole 2 % shampoo Commonly known as:  NIZORAL Apply  to affected area daily as needed for Itching. lisinopril 20 mg tablet Commonly known as:  Dorean Peeks Take 1 Tab by mouth daily. mometasone 50 mcg/actuation nasal spray Commonly known as:  NASONEX  
2 Sprays by Both Nostrils route daily. multivitamin tablet Commonly known as:  ONE A DAY Take 1 Tab by mouth daily. omega-3 fatty acids-vitamin e 1,000 mg Cap Take 1 Cap by mouth. OTHER Take  by mouth daily. Triumebq  
  
 rosuvastatin 5 mg tablet Commonly known as:  CRESTOR Take 5 mg by mouth nightly. timolol 0.5 % ophthalmic solution Commonly known as:  TIMOPTIC Administer 1 Drop to both eyes two (2) times a day. tiZANidine 4 mg tablet Commonly known as:  Huel Latch TAKE 1 TABLET THREE TIMES DAILY AS NEEDED FOR  MUSCLE  SPASM  
  
 triamcinolone acetonide 0.025 % lotion Apply  to affected area two (2) times a day. Patient Instructions There are no preventive care reminders to display for this patient. Patient still has a copy of the Advanced Directive form and understands to bring it in once completed. PLAN: 
Whyte points we discussed today: 
1. Your fasting (before breakfast) blood sugar goal is less than 140. 
 
2. Your blood sugar goal 2 hours after you eat a meal is less than 180. 
 
3. Your \"A1C\" measures your average blood sugar over the course of 3-4 months. Your goal is to keep your \"A1C\" lab test measurement, less than 7. Lab Results Component Value Date/Time Hemoglobin A1c 6.8 05/23/2017 09:00 AM  
 Hemoglobin A1c, External 6.4 01/17/2017 4. Eye exam: You should have a formal eye exam to screen for diabetic eye disease once a year 5. Vaccinations: You should have a tetanus vaccination every 10 years, a flu shot once every winter/fall season, and a pneumonia vaccination every 5 years 6. Do not skip meals. Eat small meals throughout the day. Try to limit carb/sugar intake by limiting: soda intake, pasta/rice/chips/pretzels/potato/bread/bagels/cookies/candy in your diet 7. Cholesterol: You need to have your cholesterol checked once a year 8. Kidney function blood/urine tests: You need to have a blood and urine test once a year to screen for diabetic kidney disease 9. Diabetes supplies: Let us know if you need any diabetes supplies Learning About Tests When You Have Diabetes Why do you need regular diabetes tests? Diabetes can be hard on your body if it's not well controlled. But having tests on a regular schedule can help you and your doctor find problems early, when it's easier to start managing them. What tests do you need? The tests you may have, how often you should have them, and the goals of the tests are: 
A1c blood test. This test shows the average level of blood sugar over the past 2 to 3 months. It helps your doctor see whether blood sugar levels have been staying within your target range. · How often: Every 3 to 6 months · Goal: A blood sugar level in your target range Blood pressure test: This test measures the pressure of blood flow in the arteries. Controlling blood pressure can help prevent damage to nerves and blood vessels. · How often: Every 3 to 6 months · Goal: A blood pressure level in your target range Cholesterol test: This test measures the amount of a type of fat in the blood. It is common for people with diabetes to also have high cholesterol. Too much cholesterol in the blood can build up inside the blood vessels and raise the risk for heart attack and stroke. · How often: At the time of your diabetes diagnosis, and as often as your doctor recommends after that · Goal: A cholesterol level in your target range Albumin-creatinine ratio test: This test checks for kidney damage by looking for the protein albumin (say \"al-BYOO-navjot\") in the urine. Albumin is normally found in the blood. Kidney damage can let small amounts of it (microalbumin) leak into the urine. · How often: Once a year · Goal: No protein in the urine Blood creatinine test/estimated glomerular filtration (eGFR): The blood creatinine (say \"fjkz-GF-lb-neen\") level shows how well your kidneys are working. Creatinine is a waste product that muscles release into the blood. Blood creatinine is used to estimate the glomerular filtration rate. A high level of creatinine and/or a low eGFR may mean your kidneys are not working as well as they should. · How often: Once a year · Goal: Normal level of creatinine in the blood. The eGFR goal is greater than 60 mL/min/1.73 m². Complete foot exam: The doctor checks for foot sores and whether any sensation has been lost. 
· How often: Once a year · Goal: Healthy feet with no foot ulcers or loss of feeling Dental exam and cleaning: The dentist checks for gum disease and tooth decay. People with high blood sugar are more likely to have these problems. · How often: Every 6 months · Goal: Healthy teeth and gums Complete eye exam: High blood sugar levels can damage the eyes. This exam is done by an ophthalmologist or optometrist. It includes a dilated eye exam. The exam shows whether there's damage to the back of the eye (diabetic retinopathy). · How often: Once a year. If you don't have any signs of diabetic retinopathy, your doctor may recommend an exam every 2 years. · Goal: No damage to the back of the eye Thyroid-stimulating hormone (TSH) blood test: This test checks for thyroid disease.  Too little thyroid hormone can cause some medicines (like insulin) to stay in the body longer. This can cause low blood sugar. You may be tested if you have high cholesterol or are a woman over 48years old. · How often: As part of your diabetes diagnosis, and as often as your doctor recommends after that · Goal: Normal level of TSH in the blood Follow-up care is a key part of your treatment and safety. Be sure to make and go to all appointments, and call your doctor if you are having problems. It's also a good idea to know your test results and keep a list of the medicines you take. Where can you learn more? Go to http://adela-mily.info/. Enter 01.14.46.38.08 in the search box to learn more about \"Learning About Tests When You Have Diabetes. \" Current as of: May 23, 2016 Content Version: 11.2 © 5545-3285 [employer]. Care instructions adapted under license by [employer] (which disclaims liability or warranty for this information). If you have questions about a medical condition or this instruction, always ask your healthcare professional. [address] any warranty or liability for your use of this information. Introducing [facility]! Jenn Wendy introduces [email or web address] patient portal. Now you can access parts of your medical record, email your doctor's office, and request medication refills online. 1. In your internet browser, go to [email or web address] 2. Click on the First Time User? Click Here link in the Sign In box. You will see the New Member Sign Up page. 3. Enter your [email or web address] Access Code exactly as it appears below. You will not need to use this code after youve completed the sign-up process. If you do not sign up before the expiration date, you must request a new code. · [email or web address] Access Code: [email or web address] Expires: 8/21/2017  9:08 AM 
 
4.  Enter the last four digits of your Social Security Number (xxxx) and Date of Birth (mm/dd/yyyy) as indicated and click Submit. You will be taken to the next sign-up page. 5. Create a PublishThis ID. This will be your PublishThis login ID and cannot be changed, so think of one that is secure and easy to remember. 6. Create a PublishThis password. You can change your password at any time. 7. Enter your Password Reset Question and Answer. This can be used at a later time if you forget your password. 8. Enter your e-mail address. You will receive e-mail notification when new information is available in [address] 9. Click Sign Up. You can now view and download portions of your medical record. 10. Click the Download Summary menu link to download a portable copy of your medical information. If you have questions, please visit the Frequently Asked Questions section of the PublishThis website. Remember, PublishThis is NOT to be used for urgent needs. For medical emergencies, dial 911. Now available from your iPhone and Android! Please provide this summary of care documentation to your next provider. Your primary care clinician is listed as Raeann Murphy. If you have any questions after today's visit, please call [phone].

## 2017-06-07 PROBLEM — E11.9 TYPE 2 DIABETES MELLITUS WITHOUT COMPLICATION (HCC): Status: ACTIVE | Noted: 2017-01-23

## 2017-06-07 NOTE — PROGRESS NOTES
INTERNISTS OF [facility]:  6/7/2017, MRN: [id number]      Brittani Hernandez is a 60 y.o. male and presents to clinic for Diabetes (Type 2 follow up) and Labs (done 5-23-17 to discuss)    Subjective: The patient is a 42-year-old male with history of a tubular adenomatous colon polyp in June 2011, diabetes, abnormal TSH, onychomycosis,  glaucoma, goiter with thyroid nodules (followed by Endocrinology team), hypertension, hyperlipidemia, and HIV (followed by [facility]). Type 2 diabetes: The patient's A1c was recently checked. It increased from 6.4 in January of this year to 6.8. Thus, the patient is a newly diagnosed diabetic. He admits to eating lots of potato-based foods. He also likes a lot of sweets. He also drinks a lot of sweet tea. He tries to regularly exercise. BP is elevated likely due to anxiety from new diagnosis. +ACEi. +Statin. His last formal eye exam was done roughly 4 months ago. BP Readings from Last 3 Encounters:   06/06/17 (!) 168/95   05/23/17 135/75   01/20/17 138/82         Patient Active Problem List    Diagnosis Date Noted    Tubular adenoma of colon 6/22/11 05/23/2017    Type 2 diabetes mellitus without complication 84/03/0765    Abnormal TSH 01/22/2017    Glaucoma 01/20/2017    Goiter- with thyroid nodules per U/S 01/20/2017    Essential hypertension 01/18/2016    HLD (hyperlipidemia) 01/18/2016    [facility] 01/18/2016       Current Outpatient Prescriptions   Medication Sig Dispense Refill    timolol (TIMOPTIC) 0.5 % ophthalmic solution Administer 1 Drop to both eyes two (2) times a day.  lisinopril (PRINIVIL, ZESTRIL) 20 mg tablet Take 1 Tab by mouth daily. 90 Tab 3    mometasone (NASONEX) 50 mcg/actuation nasal spray 2 Sprays by Both Nostrils route daily.  3 Container 3    tiZANidine (ZANAFLEX) 4 mg tablet TAKE 1 TABLET THREE TIMES DAILY AS NEEDED FOR  MUSCLE  SPASM 90 Tab 0    brimonidine (ALPHAGAN P) 0.1 % ophthalmic solution every eight (8) hours.  OTHER Take  by mouth daily. Triumebq       rosuvastatin (CRESTOR) 5 mg tablet Take 5 mg by mouth nightly.  triamcinolone acetonide 0.025 % lotion Apply  to affected area two (2) times a day.  ketoconazole (NIZORAL) 2 % shampoo Apply  to affected area daily as needed for Itching.  didanosine (VIDEX EC) 400 mg capsule Take 400 mg by mouth daily.  omega-3 fatty acids-vitamin e 1,000 mg cap Take 1 Cap by mouth.  multivitamin (ONE A DAY) tablet Take 1 Tab by mouth daily.  ascorbic acid (VITAMIN C) 500 mg tablet Take 500 mg by mouth daily.  ergocalciferol (ERGOCALCIFEROL) 50,000 unit capsule Take 50,000 Units by mouth every seven (7) days. No Known Allergies    Past Medical History:   Diagnosis Date    Arthritis     established with Dr. Medina Poster chiropractor    Autoimmune disease ([facility]     Blindness     left eye; and periperal vision is absent in [facility] Consultant Dr. Giuliano Ramires HIV (human immunodeficiency virus infection) [facility]) dx Mitchell Been Ms. Ramos; viral loads undetectable for 13 yrs    Hypercholesterolemia     Hypertension        Past Surgical History:   Procedure Laterality Date    COLONOSCOPY N/A 8/12/2016    COLONOSCOPY performed by Bouchra Raman MD at [facility] HX COLONOSCOPY      2011    HX [facility]      eye surg for glaucoma       Family History   Problem Relation Age of Onset    Family history unknown: Yes       Social History   Substance Use Topics    Smoking status: Former Smoker     Quit date: 7/8/2006    Smokeless tobacco: Never Used      Comment: smoked for 30 years: 1/2 ppd.  Alcohol use No       ROS   Review of Systems   Constitutional: Negative for chills and fever. HENT: Negative for ear pain and sore throat. Eyes: Negative for blurred vision and pain. Respiratory: Negative for cough and shortness of breath.     Cardiovascular: Negative for chest pain. Gastrointestinal: Negative for abdominal pain. Genitourinary: Negative for dysuria. Musculoskeletal: Positive for joint pain (chronic). Negative for myalgias. Skin: Negative for rash. Neurological: Negative for tingling, focal weakness and headaches. Endo/Heme/Allergies: Does not bruise/bleed easily. Psychiatric/Behavioral: Negative for substance abuse. Objective     Vitals:    06/06/17 1507   BP: (!) 168/95   Pulse: 78   Resp: 20   Temp: 98.1 °F (36.7 °C)   TempSrc: Oral   SpO2: 99%   Weight: 250 lb 9.6 oz (113.7 kg)   Height: 6' 2\" (1.88 m)   PainSc:   1   PainLoc: Neck       Physical Exam   Constitutional: He is oriented to person, place, and time and well-developed, well-nourished, and in no distress. HENT:   Head: Normocephalic and atraumatic. Right Ear: External ear normal.   Left Ear: External ear normal.   Nose: Nose normal.   Mouth/Throat: Oropharynx is clear and moist. No oropharyngeal exudate. Eyes: Conjunctivae and EOM are normal. Pupils are equal, round, and reactive to light. Right eye exhibits no discharge. Left eye exhibits no discharge. No scleral icterus. Neck: Neck supple. Cardiovascular: Normal rate, regular rhythm, normal heart sounds and intact distal pulses. Pulmonary/Chest: Effort normal and breath sounds normal. No respiratory distress. He has no wheezes. He has no rales. Abdominal: Soft. Bowel sounds are normal. He exhibits no distension. There is no tenderness. There is no rebound and no guarding. Musculoskeletal: He exhibits no edema or tenderness (BUE are NTTP). Lymphadenopathy:     He has no cervical adenopathy. Neurological: He is alert and oriented to person, place, and time. He exhibits normal muscle tone. Gait normal.   Skin: Skin is warm and dry. No rash noted. No erythema. Psychiatric: Affect normal.   Nursing note and vitals reviewed.     Diabetic foot exam:     Left: Filament test normal sensation with micro filament   Pulse DP: 2+ (normal)   Pulse PT: 2+ (normal)   Deformities: Yes - +onychomycosis  Right: Filament test normal sensation with micro filament   Pulse DP: 2+ (normal)   Pulse PT: 2+ (normal)   Deformities: Yes - +onychomycosis        LABS   Data Review:   Lab Results   Component Value Date/Time    WBC 5.0 09/15/2016 09:02 AM    HGB 14.5 09/15/2016 09:02 AM    HCT 44.7 09/15/2016 09:02 AM    PLATELET 681 83/96/3811 09:02 AM    MCV 95 09/15/2016 09:02 AM       Lab Results   Component Value Date/Time    Sodium 141 09/15/2016 09:02 AM    Potassium 4.6 09/15/2016 09:02 AM    Chloride 99 09/15/2016 09:02 AM    CO2 29 09/15/2016 09:02 AM    Anion gap 13.0 09/15/2016 09:02 AM    Glucose 106 09/15/2016 09:02 AM    BUN 14 09/15/2016 09:02 AM    Creatinine 1.1 09/15/2016 09:02 AM    Calcium 9.5 09/15/2016 09:02 AM       Lab Results   Component Value Date/Time    Cholesterol, total 159 09/15/2016 09:02 AM    HDL Cholesterol 49 09/15/2016 09:02 AM    LDL, calculated 91 09/15/2016 09:02 AM    VLDL, calculated 18 09/15/2016 09:02 AM    Triglyceride 91 09/15/2016 09:02 AM       Lab Results   Component Value Date/Time    Hemoglobin A1c 6.8 05/23/2017 09:00 AM    Hemoglobin A1c, External 6.4 01/17/2017       Assessment/Plan:   Type 2 DM: His A1c is 6.8.  -The patient was counseled on the new diagnosis of diabetes.  -We discussed adding metformin, which the patient declines at this time, preferring to do a 3 month trial of aggressive lifestyle modification.  -We discussed in depth the diabetic diet and how to maintain his diet. The patient was instructed to also count his carbs. We went over multiple handouts on carb counting. He was instructed to restrict his carb intake to 45 g per meal, assuming that he consumes 3 meals per day.   -We reviewed the preventative screenings are necessary given his new diagnosis.  -We discussed his blood sugar goals and A1c goal.  -We discussed the potential complications of poorly treated type 2 diabetes. - A diabetic foot exam was performed today  - Will defer on a referral to Podiatry team since pt is able to cut his own toe nails. Additionally, we discussed treatment options for onychomycosis. The pt agrees with deferring treatment at this time. There are no preventive care reminders to display for this patient. Lab review: labs are reviewed, up to date and normal    I have discussed the diagnosis with the patient and the intended plan as seen in the above orders. The patient has received an after-visit summary and questions were answered concerning future plans. I have discussed medication side effects and warnings with the patient as well. I have reviewed the plan of care with the patient, accepted their input and they are in agreement with the treatment goals. All questions were answered. The patient understands the plan of care. Handouts provided today with above information. Pt instructed if symptoms worsen to call the office or report to the ED for continued care. Greater than 50% of the visit time was spent in counseling and/or coordination of care. I spent 30 minutes with patient for this encounter, 25 of which were spent counseling him as described above.     Follow-up Disposition: Not on File    Michell Doan MD

## 2017-08-09 ENCOUNTER — OFFICE VISIT (OUTPATIENT)
Dept: INTERNAL MEDICINE CLINIC | Age: 57
End: 2017-08-09

## 2017-08-09 VITALS
DIASTOLIC BLOOD PRESSURE: 91 MMHG | BODY MASS INDEX: 29 KG/M2 | SYSTOLIC BLOOD PRESSURE: 144 MMHG | HEIGHT: 74 IN | RESPIRATION RATE: 16 BRPM | OXYGEN SATURATION: 100 % | WEIGHT: 226 LBS | TEMPERATURE: 96.9 F | HEART RATE: 68 BPM

## 2017-08-09 DIAGNOSIS — H40.9 GLAUCOMA, UNSPECIFIED GLAUCOMA, UNSPECIFIED LATERALITY: ICD-10-CM

## 2017-08-09 DIAGNOSIS — B20 HIV (HUMAN IMMUNODEFICIENCY VIRUS INFECTION) (HCC): ICD-10-CM

## 2017-08-09 DIAGNOSIS — G89.29 CHRONIC BILATERAL LOW BACK PAIN WITHOUT SCIATICA: ICD-10-CM

## 2017-08-09 DIAGNOSIS — M54.50 CHRONIC BILATERAL LOW BACK PAIN WITHOUT SCIATICA: ICD-10-CM

## 2017-08-09 DIAGNOSIS — E04.9 GOITER: ICD-10-CM

## 2017-08-09 DIAGNOSIS — I10 ESSENTIAL HYPERTENSION: ICD-10-CM

## 2017-08-09 DIAGNOSIS — E11.9 TYPE 2 DIABETES MELLITUS WITHOUT COMPLICATION, UNSPECIFIED LONG TERM INSULIN USE STATUS: Primary | ICD-10-CM

## 2017-08-09 LAB — HBA1C MFR BLD HPLC: 5.7 %

## 2017-08-09 RX ORDER — ZINC GLUCONATE 10 MG
LOZENGE ORAL
COMMUNITY
End: 2019-07-15

## 2017-08-09 NOTE — MR AVS SNAPSHOT
Visit Information Date & Time Provider Department Dept. Phone Encounter #  
 8/9/2017  9:30 AM [address] [phone] [id number] Follow-up Instructions Return in about 4 months (around 12/9/2017) for diabetes, hypertension. Your Appointments 9/13/2017  8:30 AM  
Office Visit with Amelia Morales MD  
Internist of [address] [place] [address] Appt Note: ov 6mos. falcon; ov 3mos. falcon; ov 3 mos. falcon [address], [address] [place]  
  
   
 [address], [address] Upcoming Health Maintenance Date Due  
 EYE EXAM RETINAL OR DILATED Q1 8/8/1970 INFLUENZA AGE 9 TO ADULT 8/1/2017 HEMOGLOBIN A1C Q6M 11/23/2017 MICROALBUMIN Q1 1/17/2018 LIPID PANEL Q1 1/17/2018 MEDICARE YEARLY EXAM 5/24/2018 FOOT EXAM Q1 6/7/2018 COLONOSCOPY 8/12/2019 DTaP/Tdap/Td series (2 - Td) 10/17/2021 Allergies as of 8/9/2017  Review Complete On: 8/9/2017 By: Ruth Zepeda MD  
 No Known Allergies Current Immunizations  Never Reviewed Name Date Influenza Vaccine 9/3/2014, 11/8/2012 Pneumococcal Conjugate (PCV-13) 6/10/2013 Pneumococcal Polysaccharide (PPSV-23) 1/22/2009 Pneumococcal Vaccine (Unspecified Type) 3/19/2014 Tdap 10/17/2011 Not reviewed this visit You Were Diagnosed With   
  
 Codes Comments Type 2 diabetes mellitus without complication, unspecified long term insulin use status (HCC)    -  Primary ICD-10-CM: E11.9 ICD-9-CM: 250.00   
 HIV (human immunodeficiency virus infection) ([facility]     ICD-10-CM: B96 ICD-9-CM: V08 Glaucoma, unspecified glaucoma, unspecified laterality     ICD-10-CM: H40.9 ICD-9-CM: 365.9 Goiter     ICD-10-CM: E04.9 ICD-9-CM: 240. 9 Chronic bilateral low back pain without sciatica     ICD-10-CM: M54.5, G89.29 ICD-9-CM: 724.2, 338.29  Essential hypertension     ICD-10-CM: I10 
 ICD-9-CM: 401.9 Vitals BP Pulse Temp Resp Height(growth percentile) Weight(growth percentile) (!) 144/91 (BP 1 Location: Left arm, BP Patient Position: Sitting) 68 96.9 °F (36.1 °C) (Oral) 16 6' 2\" (1.88 m) 226 lb (102.5 kg) SpO2 BMI Smoking Status 100% 29.02 kg/m2 Former Smoker Vitals History BMI and BSA Data Body Mass Index Body Surface Area  
 29.02 kg/m 2 2.31 m 2 Preferred Pharmacy Pharmacy Name Phone [facility] [address] [place] [phone] [facility] [address] [place] [address] [phone] Your Updated Medication List  
  
   
This list is accurate as of: 8/9/17 10:10 AM.  Always use your most recent med list.  
  
  
  
  
 ascorbic acid (vitamin C) 500 mg tablet Commonly known as:  VITAMIN C Take 500 mg by mouth daily. BIOTIN PO Take  by mouth. brimonidine 0.1 % ophthalmic solution Commonly known as:  ALPHAGAN P  
every eight (8) hours. didanosine 400 mg capsule Commonly known as:  VIDEX EC Take 400 mg by mouth daily. ergocalciferol 50,000 unit capsule Commonly known as:  ERGOCALCIFEROL Take 50,000 Units by mouth every seven (7) days. Flaxseed Oil Oil  
by Does Not Apply route.  
  
 ketoconazole 2 % shampoo Commonly known as:  NIZORAL Apply  to affected area daily as needed for Itching. lisinopril 20 mg tablet Commonly known as:  Duran Mil[place] Take 1 Tab by mouth daily. magnesium 250 mg Tab Take  by mouth.  
  
 mometasone 50 mcg/actuation nasal spray Commonly known as:  NASONEX  
2 Sprays by Both Nostrils route daily. multivitamin tablet Commonly known as:  ONE A DAY Take 1 Tab by mouth daily. omega-3 fatty acids-vitamin e 1,000 mg Cap Take 1 Cap by mouth. OTHER Take  by mouth daily. Triumebq  
  
 rosuvastatin 5 mg tablet Commonly known as:  CRESTOR Take 5 mg by mouth nightly. timolol 0.5 % ophthalmic solution Commonly known as:  TIMOPTIC Administer 1 Drop to both eyes two (2) times a day. tiZANidine 4 mg tablet Commonly known as:  Pratik Perazator TAKE 1 TABLET THREE TIMES DAILY AS NEEDED FOR  MUSCLE  SPASM  
  
 triamcinolone acetonide 0.025 % lotion Apply  to affected area two (2) times a day. We Performed the Following AMB POC HEMOGLOBIN A1C [96736 CPT(R)] REFERRAL TO CHIROPRACTIC [REF16 Custom] Comments:  
 Please evaluate patient for continued care of back pain, degenerative changes. Wishes to have continued care with Dr. Reese Young. REFERRAL TO INFECTIOUS DISEASE [REF37 Custom] Comments:  
 Please evaluate patient for continued HIV care with Elsy Kruger at [facility] REFERRAL TO OPHTHALMOLOGY [REF57 Custom] Comments:  
 Please evaluate patient for continued treatment for glaucoma, blindness OS at [facility]. Follow-up Instructions Return in about 4 months (around 12/9/2017) for diabetes, hypertension. Referral Information Referral ID Referred By Referred To  
  
 [id number] Jessee SÁNCHEZ Not Available Visits Status Start Date End Date 1 New Request 8/9/17 8/9/18 If your referral has a status of pending review or denied, additional information will be sent to support the outcome of this decision. Referral ID Referred By Referred To  
 [id number] Jessee SÁNCHEZ Not Available Visits Status Start Date End Date 1 New Request 8/9/17 8/9/18 If your referral has a status of pending review or denied, additional information will be sent to support the outcome of this decision. Referral ID Referred By Referred To  
 [id number] Jessee SÁNCHEZ Not Available Visits Status Start Date End Date 1 New Request 8/9/17 8/9/18 If your referral has a status of pending review or denied, additional information will be sent to support the outcome of this decision. Patient Instructions Learning About Tests When You Have Diabetes Why do you need regular diabetes tests? Diabetes can be hard on your body if it's not well controlled. But having tests on a regular schedule can help you and your doctor find problems early, when it's easier to start managing them. What tests do you need? The tests you may have, how often you should have them, and the goals of the tests are: 
A1c blood test. This test shows the average level of blood sugar over the past 2 to 3 months. It helps your doctor see whether blood sugar levels have been staying within your target range. · How often: Every 3 to 6 months · Goal: A blood sugar level in your target range Blood pressure test: This test measures the pressure of blood flow in the arteries. Controlling blood pressure can help prevent damage to nerves and blood vessels. · How often: Every 3 to 6 months · Goal: A blood pressure level in your target range Cholesterol test: This test measures the amount of a type of fat in the blood. It is common for people with diabetes to also have high cholesterol. Too much cholesterol in the blood can build up inside the blood vessels and raise the risk for heart attack and stroke. · How often: At the time of your diabetes diagnosis, and as often as your doctor recommends after that · Goal: A cholesterol level in your target range Albumin-creatinine ratio test: This test checks for kidney damage by looking for the protein albumin (say \"al-BYOO-navjot\") in the urine. Albumin is normally found in the blood. Kidney damage can let small amounts of it (microalbumin) leak into the urine. · How often: Once a year · Goal: No protein in the urine Blood creatinine test/estimated glomerular filtration (eGFR): The blood creatinine (say \"qvrv-HD-hx-neen\") level shows how well your kidneys are working. Creatinine is a waste product that muscles release into the blood.  Blood creatinine is used to estimate the glomerular filtration rate. A high level of creatinine and/or a low eGFR may mean your kidneys are not working as well as they should. · How often: Once a year · Goal: Normal level of creatinine in the blood. The eGFR goal is greater than 60 mL/min/1.73 m². Complete foot exam: The doctor checks for foot sores and whether any sensation has been lost. 
· How often: Once a year · Goal: Healthy feet with no foot ulcers or loss of feeling Dental exam and cleaning: The dentist checks for gum disease and tooth decay. People with high blood sugar are more likely to have these problems. · How often: Every 6 months · Goal: Healthy teeth and gums Complete eye exam: High blood sugar levels can damage the eyes. This exam is done by an ophthalmologist or optometrist. It includes a dilated eye exam. The exam shows whether there's damage to the back of the eye (diabetic retinopathy). · How often: Once a year. If you don't have any signs of diabetic retinopathy, your doctor may recommend an exam every 2 years. · Goal: No damage to the back of the eye Thyroid-stimulating hormone (TSH) blood test: This test checks for thyroid disease. Too little thyroid hormone can cause some medicines (like insulin) to stay in the body longer. This can cause low blood sugar. You may be tested if you have high cholesterol or are a woman over 48years old. · How often: As part of your diabetes diagnosis, and as often as your doctor recommends after that · Goal: Normal level of TSH in the blood Follow-up care is a key part of your treatment and safety. Be sure to make and go to all appointments, and call your doctor if you are having problems. It's also a good idea to know your test results and keep a list of the medicines you take. Where can you learn more? Go to http://adela-mily.info/. Enter [id number] in the search box to learn more about \"Learning About Tests When You Have Diabetes. \" Current as of: March 13, 2017 Content Version: 11.3 © 6591-3585 Healthwise, Incorporated. Care instructions adapted under license by [employer] (which disclaims liability or warranty for this information). If you have questions about a medical condition or this instruction, always ask your healthcare professional. [address] any warranty or liability for your use of this information. Introducing [facility]! Gardenia Walker introduces Speek patient portal. Now you can access parts of your medical record, email your doctor's office, and request medication refills online. 1. In your internet browser, go to [email or web address] 2. Click on the First Time User? Click Here link in the Sign In box. You will see the New Member Sign Up page. 3. Enter your Speek Access Code exactly as it appears below. You will not need to use this code after youve completed the sign-up process. If you do not sign up before the expiration date, you must request a new code. · Speek Access Code: [email or web address] Expires: 8/21/2017  9:08 AM 
 
4. Enter the last four digits of your Social Security Number (xxxx) and Date of Birth (mm/dd/yyyy) as indicated and click Submit. You will be taken to the next sign-up page. 5. Create a Speek ID. This will be your Speek login ID and cannot be changed, so think of one that is secure and easy to remember. 6. Create a Speek password. You can change your password at any time. 7. Enter your Password Reset Question and Answer. This can be used at a later time if you forget your password. 8. Enter your e-mail address. You will receive e-mail notification when new information is available in [address] 9. Click Sign Up. You can now view and download portions of your medical record. 10. Click the Download Summary menu link to download a portable copy of your medical information.  
 
If you have questions, please visit the Frequently Asked Questions section of the [facility] [place]. Remember, [employer] is NOT to be used for urgent needs. For medical emergencies, dial 911. Now available from your iPhone and Android! Please provide this summary of care documentation to your next provider. Your primary care clinician is listed as Miller Kimball. If you have any questions after today's visit, please call [phone].

## 2017-08-09 NOTE — PROGRESS NOTES
HISTORY OF PRESENT ILLNESS  Margaux Stevens is a 59 y.o. male. HPI Comments: 58 yo male here to establish care, f/u of DM. Recently dx with this in [facility] and with dietary/lifestyle changes has lost 24 lbs since that time in an effort to avoid medications. He has h/o HIV dx ~ 20 years ago, undetectable for ~18 years. Followed by ID at [facility]. Has h/o glaucoma with blindness OS related to this. Chronic back pain which is helped by chiropractic care. lombardozzi - chiro  Catherine naylor - ID  [employer] - ( glaucoma spec)      Review of Systems   Constitutional: Negative for chills, fever and weight loss. HENT: Negative for congestion and ear pain (but occasionally with sense of fullness). Eyes: Negative for pain and discharge. Respiratory: Negative for cough and shortness of breath. Cardiovascular: Negative for chest pain, palpitations and leg swelling. Gastrointestinal: Negative for heartburn, nausea and vomiting. Genitourinary: Negative for frequency and urgency. Musculoskeletal: Positive for back pain. Negative for myalgias. Skin: Negative for itching and rash. Neurological: Negative for dizziness, tingling and headaches. Psychiatric/Behavioral: Negative for depression. The patient is not nervous/anxious. Past Medical History:   Diagnosis Date    Arthritis     established with Dr. Lisa Norris chiropractor    Autoimmune disease ([facility]     Blindness     left eye; and periperal vision is absent in R    Diabetes [facility])    Symone Tate Consultant Dr. Rneu Solorzano HIV (human immunodeficiency virus infection) [facility]) dx Dahlia Virgen Ms. Tows; viral loads undetectable for 13 yrs    Hypercholesterolemia     Hypertension      Current Outpatient Prescriptions on File Prior to Visit   Medication Sig Dispense Refill    lisinopril (PRINIVIL, ZESTRIL) 20 mg tablet Take 1 Tab by mouth daily.  90 Tab 3    mometasone (NASONEX) 50 mcg/actuation nasal spray 2 Sprays by Both Nostrils route daily. 3 Container 3    tiZANidine (ZANAFLEX) 4 mg tablet TAKE 1 TABLET THREE TIMES DAILY AS NEEDED FOR  MUSCLE  SPASM 90 Tab 0    brimonidine (ALPHAGAN P) 0.1 % ophthalmic solution every eight (8) hours.  OTHER Take  by mouth daily. Triumebq       rosuvastatin (CRESTOR) 5 mg tablet Take 5 mg by mouth nightly.  triamcinolone acetonide 0.025 % lotion Apply  to affected area two (2) times a day.  ketoconazole (NIZORAL) 2 % shampoo Apply  to affected area daily as needed for Itching.  omega-3 fatty acids-vitamin e 1,000 mg cap Take 1 Cap by mouth.  multivitamin (ONE A DAY) tablet Take 1 Tab by mouth daily.  ascorbic acid (VITAMIN C) 500 mg tablet Take 500 mg by mouth daily.  timolol (TIMOPTIC) 0.5 % ophthalmic solution Administer 1 Drop to both eyes two (2) times a day.  didanosine (VIDEX EC) 400 mg capsule Take 400 mg by mouth daily.  ergocalciferol (ERGOCALCIFEROL) 50,000 unit capsule Take 50,000 Units by mouth every seven (7) days. No current facility-administered medications on file prior to visit. Social History   Substance Use Topics    Smoking status: Former Smoker     Quit date: 7/8/2006    Smokeless tobacco: Never Used      Comment: smoked for 30 years: 1/2 ppd.  Alcohol use No   No Known Allergies   Family History   Problem Relation Age of Onset    Family history unknown: Yes     Physical Exam   Constitutional: He is oriented to person, place, and time. He appears well-developed and well-nourished. No distress. BP (!) 144/91 (BP 1 Location: Left arm, BP Patient Position: Sitting)  Pulse 68  Temp 96.9 °F (36.1 °C) (Oral)   Resp 16  Ht 6' 2\" (1.88 m)  Wt 226 lb (102.5 kg)  SpO2 100%  BMI 29.02 kg/m2     HENT:   Right Ear: External ear normal.   Left Ear: External ear normal.   Neck: Neck supple. Cardiovascular: Normal rate, regular rhythm and normal heart sounds.   Exam reveals no gallop and no friction rub. No murmur heard. Pulmonary/Chest: Effort normal and breath sounds normal. No respiratory distress. He has no wheezes. He has no rales. Abdominal: Soft. He exhibits no distension. There is no tenderness. There is no rebound and no guarding. Musculoskeletal: He exhibits no edema or tenderness. Lymphadenopathy:     He has no cervical adenopathy. Neurological: He is alert and oriented to person, place, and time. He exhibits normal muscle tone. Skin: Skin is warm and dry. Psychiatric: He has a normal mood and affect. His behavior is normal.     Lab Results   Component Value Date/Time    Hemoglobin A1c 6.8 05/23/2017 09:00 AM    Hemoglobin A1c, External 6.4 01/17/2017     Lab Results   Component Value Date/Time    Sodium 141 09/15/2016 09:02 AM    Potassium 4.6 09/15/2016 09:02 AM    Chloride 99 09/15/2016 09:02 AM    CO2 29 09/15/2016 09:02 AM    Anion gap 13.0 09/15/2016 09:02 AM    Glucose 106 09/15/2016 09:02 AM    BUN 14 09/15/2016 09:02 AM    Creatinine 1.1 09/15/2016 09:02 AM    Calcium 9.5 09/15/2016 09:02 AM    Bilirubin, total 0.6 09/15/2016 09:02 AM    AST (SGOT) 20 09/15/2016 09:02 AM    Alk. phosphatase 85 09/15/2016 09:02 AM    Protein, total 7.1 09/15/2016 09:02 AM    Albumin 4.2 09/15/2016 09:02 AM    Globulin 2.9 09/15/2016 09:02 AM    A-G Ratio 1.4 09/15/2016 09:02 AM    ALT (SGPT) 23 09/15/2016 09:02 AM     Lab Results   Component Value Date/Time    Cholesterol, total 159 09/15/2016 09:02 AM    HDL Cholesterol 49 09/15/2016 09:02 AM    LDL, calculated 91 09/15/2016 09:02 AM    VLDL, calculated 18 09/15/2016 09:02 AM    Triglyceride 91 09/15/2016 09:02 AM     ASSESSMENT and PLAN    ICD-10-CM ICD-9-CM    1.  Type 2 diabetes mellitus without complication, unspecified long term insulin use status (HCC) E11.9 250.00 AMB POC HEMOGLOBIN A1C      REFERRAL TO DIETITIAN   2. HIV (human immunodeficiency virus infection) ([facility] Z21 V08 REFERRAL TO INFECTIOUS DISEASE   3. Glaucoma, unspecified glaucoma, unspecified laterality H40.9 365.9 REFERRAL TO OPHTHALMOLOGY   4. Goiter- with thyroid nodules per U/S E04.9 240.9    5. Chronic bilateral low back pain without sciatica M54.5 724.2 REFERRAL TO CHIROPRACTIC    G89.29 338.29    6. Essential hypertension I10 401.9    Repeat POC A1c improved to 5.7% with dietary changes. No medication at this time. Will refer to dietician for further guidance on diet. BP a little elevated. Will continue current medication for now and f/u in 4 months. Referrals entered for continued f/u with ID, ophthalmology, chiropractor.

## 2017-08-09 NOTE — PATIENT INSTRUCTIONS
Learning About Tests When You Have Diabetes  Why do you need regular diabetes tests? Diabetes can be hard on your body if it's not well controlled. But having tests on a regular schedule can help you and your doctor find problems early, when it's easier to start managing them. What tests do you need? The tests you may have, how often you should have them, and the goals of the tests are:  A1c blood test. This test shows the average level of blood sugar over the past 2 to 3 months. It helps your doctor see whether blood sugar levels have been staying within your target range. · How often: Every 3 to 6 months  · Goal: A blood sugar level in your target range  Blood pressure test: This test measures the pressure of blood flow in the arteries. Controlling blood pressure can help prevent damage to nerves and blood vessels. · How often: Every 3 to 6 months  · Goal: A blood pressure level in your target range  Cholesterol test: This test measures the amount of a type of fat in the blood. It is common for people with diabetes to also have high cholesterol. Too much cholesterol in the blood can build up inside the blood vessels and raise the risk for heart attack and stroke. · How often: At the time of your diabetes diagnosis, and as often as your doctor recommends after that  · Goal: A cholesterol level in your target range  Albumin-creatinine ratio test: This test checks for kidney damage by looking for the protein albumin (say \"al-BYOO-navjot\") in the urine. Albumin is normally found in the blood. Kidney damage can let small amounts of it (microalbumin) leak into the urine. · How often: Once a year  · Goal: No protein in the urine  Blood creatinine test/estimated glomerular filtration (eGFR): The blood creatinine (say \"iegg-QQ-fk-neen\") level shows how well your kidneys are working. Creatinine is a waste product that muscles release into the blood.  Blood creatinine is used to estimate the glomerular filtration rate. A high level of creatinine and/or a low eGFR may mean your kidneys are not working as well as they should. · How often: Once a year  · Goal: Normal level of creatinine in the blood. The eGFR goal is greater than 60 mL/min/1.73 m². Complete foot exam: The doctor checks for foot sores and whether any sensation has been lost.  · How often: Once a year  · Goal: Healthy feet with no foot ulcers or loss of feeling  Dental exam and cleaning: The dentist checks for gum disease and tooth decay. People with high blood sugar are more likely to have these problems. · How often: Every 6 months  · Goal: Healthy teeth and gums  Complete eye exam: High blood sugar levels can damage the eyes. This exam is done by an ophthalmologist or optometrist. It includes a dilated eye exam. The exam shows whether there's damage to the back of the eye (diabetic retinopathy). · How often: Once a year. If you don't have any signs of diabetic retinopathy, your doctor may recommend an exam every 2 years. · Goal: No damage to the back of the eye  Thyroid-stimulating hormone (TSH) blood test: This test checks for thyroid disease. Too little thyroid hormone can cause some medicines (like insulin) to stay in the body longer. This can cause low blood sugar. You may be tested if you have high cholesterol or are a woman over 48years old. · How often: As part of your diabetes diagnosis, and as often as your doctor recommends after that  · Goal: Normal level of TSH in the blood  Follow-up care is a key part of your treatment and safety. Be sure to make and go to all appointments, and call your doctor if you are having problems. It's also a good idea to know your test results and keep a list of the medicines you take. Where can you learn more? Go to http://adela-mily.info/. Enter [id number] in the search box to learn more about \"Learning About Tests When You Have Diabetes. \"  Current as of: March 13, 2017  Content Version: 11.3  © 3149-0622 Healthwise, Incorporated. Care instructions adapted under license by [employer] (which disclaims liability or warranty for this information). If you have questions about a medical condition or this instruction, always ask your healthcare professional. [address] any warranty or liability for your use of this information.

## 2017-09-12 ENCOUNTER — DOCUMENTATION ONLY (OUTPATIENT)
Dept: INTERNAL MEDICINE CLINIC | Age: 57
End: 2017-09-12

## 2017-09-23 ENCOUNTER — HOSPITAL ENCOUNTER (EMERGENCY)
Age: 57
Discharge: HOME | End: 2017-09-23
Attending: EMERGENCY MEDICINE
Payer: MEDICARE

## 2017-09-23 VITALS
SYSTOLIC BLOOD PRESSURE: 150 MMHG | OXYGEN SATURATION: 99 % | RESPIRATION RATE: 18 BRPM | HEIGHT: 74 IN | WEIGHT: 221 LBS | BODY MASS INDEX: 28.36 KG/M2 | DIASTOLIC BLOOD PRESSURE: 84 MMHG | HEART RATE: 96 BPM | TEMPERATURE: 97.4 F

## 2017-09-23 DIAGNOSIS — T14.8XXA ABRASION: Primary | ICD-10-CM

## 2017-09-23 PROCEDURE — 99282 EMERGENCY DEPT VISIT SF MDM: CPT

## 2017-09-23 NOTE — ED TRIAGE NOTES
Per pt here for eval of left leg pain after tripping in [facility], has band aid to left shin,

## 2017-09-23 NOTE — ED PROVIDER NOTES
HPI Comments: 58 yo M c/o abrasion to left lower leg which occurred just PTA. States he tripped walking up a step at [facility] and scraped his shin on the step. Was given a bandaid by worker at [facility]. No blood thinners. No other injury. Tetanus up to date. No other complaints. Patient is a 59 y.o. male presenting with fall. Fall          Past Medical History:   Diagnosis Date    Arthritis     established with Dr. 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 Diego Foster HIV (human immunodeficiency virus infection) [facility]) dx Katina Sen Ms. Tows; viral loads undetectable for 13 yrs    Hypercholesterolemia     Hypertension        Past Surgical History:   Procedure Laterality Date    COLONOSCOPY N/A 8/12/2016    COLONOSCOPY performed by Fabrice Fleming MD at [facility] HX COLONOSCOPY      2011    HX HEENT      eye surg for glaucoma         Family History:   Problem Relation Age of Onset    Family history unknown: Yes       Social History     Social History    Marital status: SINGLE     Spouse name: N/A    Number of children: N/A    Years of education: N/A     Occupational History    Not on file. Social History Main Topics    Smoking status: Former Smoker     Quit date: 7/8/2006    Smokeless tobacco: Never Used      Comment: smoked for 30 years: 1/2 ppd.  Alcohol use No    Drug use: No      Comment: prior marijuana, crack. -approx 20 years ago    Sexual activity: No     Other Topics Concern    Not on file     Social History Narrative    Previously worked as a [employer] at [address] [place] [address] Avenue: Review of patient's allergies indicates no known allergies. Review of Systems   Skin: Positive for wound. All other systems reviewed and are negative.       Vitals:    09/23/17 1009   BP: 150/84   Pulse: 96   Resp: 18 Temp: 97.4 °F (36.3 °C)   SpO2: 99%   Weight: 100.2 kg (221 lb)   Height: 6' 2\" (1.88 m)            Physical Exam   Constitutional: He is oriented to person, place, and time. He appears well-developed and well-nourished. No distress. HENT:   Head: Normocephalic and atraumatic. Eyes: Conjunctivae are normal.   Neck: Normal range of motion. Neck supple. Cardiovascular: Normal rate, regular rhythm and normal heart sounds. Pulmonary/Chest: Effort normal and breath sounds normal. No respiratory distress. He has no wheezes. He has no rales. Musculoskeletal: Normal range of motion. Neurological: He is alert and oriented to person, place, and time. Skin: Skin is warm and dry. 2 x 2 cm abrasion to left lower leg, no active bleeding. Psychiatric: He has a normal mood and affect. His behavior is normal. Judgment and thought content normal.   Nursing note and vitals reviewed. MDM  Number of Diagnoses or Management Options  Abrasion:     ED Course       Procedures    -------------------------------------------------------------------------------------------------------------------     EKG INTERPRETATIONS:      RADIOLOGY RESULTS:   No orders to display       LABORATORY RESULTS:  No results found for this or any previous visit (from the past 12 hour(s)). CONSULTATIONS:        PROGRESS NOTES:    10:55 AM Pt well appearing and in NAD. No indication for closure. PCP f/u for further eval.     Lengthy D/W pt regarding possible worsening of pt's condition, need for follow up and strict ED return instructions for any worsening symptoms. DISPOSITION:  ED DIAGNOSIS & DISPOSITION INFORMATION  Diagnosis:   1.  Abrasion          Disposition: home    Follow-up Information     Follow up With Details Comments Grazyna Walsh 70., MD  As needed Gloria 73        [facility] EMERGENCY DEPT  To make a follow up appointment [address] [address]  [phone]          Patient's Medications   Start Taking    No medications on file   Continue Taking    ASCORBIC ACID (VITAMIN C) 500 MG TABLET    Take 500 mg by mouth daily. BIOTIN PO    Take  by mouth. BRIMONIDINE (ALPHAGAN P) 0.1 % OPHTHALMIC SOLUTION    every eight (8) hours. DIDANOSINE (VIDEX EC) 400 MG CAPSULE    Take 400 mg by mouth daily. ERGOCALCIFEROL (ERGOCALCIFEROL) 50,000 UNIT CAPSULE    Take 50,000 Units by mouth every seven (7) days. FLAXSEED OIL OIL    by Does Not Apply route. KETOCONAZOLE (NIZORAL) 2 % SHAMPOO    Apply  to affected area daily as needed for Itching. LISINOPRIL (PRINIVIL, ZESTRIL) 20 MG TABLET    Take 1 Tab by mouth daily. MAGNESIUM 250 MG TAB    Take  by mouth. MOMETASONE (NASONEX) 50 MCG/ACTUATION NASAL SPRAY    2 Sprays by Both Nostrils route daily. MULTIVITAMIN (ONE A DAY) TABLET    Take 1 Tab by mouth daily. OMEGA-3 FATTY ACIDS-VITAMIN E 1,000 MG CAP    Take 1 Cap by mouth. OTHER    Take  by mouth daily. Triumebq     ROSUVASTATIN (CRESTOR) 5 MG TABLET    Take 5 mg by mouth nightly. TIMOLOL (TIMOPTIC) 0.5 % OPHTHALMIC SOLUTION    Administer 1 Drop to both eyes two (2) times a day. TIZANIDINE (ZANAFLEX) 4 MG TABLET    TAKE 1 TABLET THREE TIMES DAILY AS NEEDED FOR  MUSCLE  SPASM    TRIAMCINOLONE ACETONIDE 0.025 % LOTION    Apply  to affected area two (2) times a day.    These Medications have changed    No medications on file   Stop Taking    No medications on file

## 2017-09-23 NOTE — DISCHARGE INSTRUCTIONS

## 2017-09-25 ENCOUNTER — PATIENT OUTREACH (OUTPATIENT)
Dept: INTERNAL MEDICINE CLINIC | Age: 57
End: 2017-09-25

## 2017-09-25 NOTE — PROGRESS NOTES
Attempted to contact patient post [facility] ED visit 9/23/17. Left voice message with telephone number for patient to return my call.

## 2017-09-26 ENCOUNTER — PATIENT OUTREACH (OUTPATIENT)
Dept: INTERNAL MEDICINE CLINIC | Age: 57
End: 2017-09-26

## 2017-09-26 NOTE — PROGRESS NOTES
Patient was seen in [facility] ED 9/23/17 for laceration to left leg from a fall. Presenting symptoms:   Laceration to left leg with no bleeding at the time of arrival to ED  New medications/changes to current medications:  none  ED utilization in past 6 months: 1    Contacted patient for ED follow up. Verified 2 patient identifiers. Introduced self, role and reason for call. Patient reports:  Slight soreness to area however patient states it is getting better  Patient denies:  Pain or bleeding, foul drainage, or swelling    Educated patient to monitor and report the following Red flags: fever, redness and swelling, foul smelling drainage or any new or concerning symptoms. Patient verbalized understanding of information discussed and is aware of  when to seek medical attention from PCP, urgent care or ED. Reviewed new medications or changes to previous medications and allergies reviewed. Opportunity to ask questions was provided. Contact information was provided for future reference or further questions. Patient declines an appointment with PCP at this time.  Will continue to monitor patient for the next 30days

## 2017-10-24 ENCOUNTER — PATIENT OUTREACH (OUTPATIENT)
Dept: FAMILY MEDICINE CLINIC | Age: 57
End: 2017-10-24

## 2017-11-08 RX ORDER — TIZANIDINE 4 MG/1
TABLET ORAL
Qty: 90 TAB | Refills: 0 | Status: SHIPPED | OUTPATIENT
Start: 2017-11-08 | End: 2017-12-01 | Stop reason: SDUPTHER

## 2017-11-08 NOTE — TELEPHONE ENCOUNTER
Requested Prescriptions     Pending Prescriptions Disp Refills    tiZANidine (ZANAFLEX) 4 mg tablet 90 Tab 0

## 2017-12-01 RX ORDER — TIZANIDINE 4 MG/1
TABLET ORAL
Qty: 90 TAB | Refills: 0 | Status: SHIPPED | OUTPATIENT
Start: 2017-12-01 | End: 2017-12-12 | Stop reason: SDUPTHER

## 2017-12-12 ENCOUNTER — OFFICE VISIT (OUTPATIENT)
Dept: INTERNAL MEDICINE CLINIC | Age: 57
End: 2017-12-12

## 2017-12-12 ENCOUNTER — HOSPITAL ENCOUNTER (OUTPATIENT)
Dept: LAB | Age: 57
Discharge: HOME | End: 2017-12-12

## 2017-12-12 VITALS
DIASTOLIC BLOOD PRESSURE: 72 MMHG | BODY MASS INDEX: 25.15 KG/M2 | OXYGEN SATURATION: 100 % | HEART RATE: 70 BPM | WEIGHT: 196 LBS | HEIGHT: 74 IN | RESPIRATION RATE: 14 BRPM | TEMPERATURE: 97.3 F | SYSTOLIC BLOOD PRESSURE: 125 MMHG

## 2017-12-12 DIAGNOSIS — Z88.9 ALLERGIC CONDITION: ICD-10-CM

## 2017-12-12 DIAGNOSIS — E11.9 TYPE 2 DIABETES MELLITUS WITHOUT COMPLICATION, UNSPECIFIED LONG TERM INSULIN USE STATUS: Primary | ICD-10-CM

## 2017-12-12 DIAGNOSIS — I10 ESSENTIAL HYPERTENSION: ICD-10-CM

## 2017-12-12 PROCEDURE — 99001 SPECIMEN HANDLING PT-LAB: CPT | Performed by: INTERNAL MEDICINE

## 2017-12-12 RX ORDER — MONTELUKAST SODIUM 10 MG/1
10 TABLET ORAL DAILY
Qty: 90 TAB | Refills: 3 | Status: SHIPPED | OUTPATIENT
Start: 2017-12-12 | End: 2018-01-12 | Stop reason: SDUPTHER

## 2017-12-12 RX ORDER — TIZANIDINE 4 MG/1
TABLET ORAL
Qty: 90 TAB | Refills: 0 | Status: SHIPPED | OUTPATIENT
Start: 2017-12-12 | End: 2018-01-17 | Stop reason: SDUPTHER

## 2017-12-12 NOTE — MR AVS SNAPSHOT
Visit Information Date & Time Provider Department Dept. Phone Encounter #  
 12/12/2017  9:30 AM Meseret Bernal [employer] [phone] [id number] Follow-up Instructions Return in about 1 month (around 1/12/2018), or if symptoms worsen or fail to improve. Upcoming Health Maintenance Date Due Influenza Age 5 to Adult 8/1/2017 EYE EXAM RETINAL OR DILATED Q1 2/28/2018* MICROALBUMIN Q1 1/17/2018 LIPID PANEL Q1 1/17/2018 HEMOGLOBIN A1C Q6M 2/9/2018 MEDICARE YEARLY EXAM 5/24/2018 FOOT EXAM Q1 6/7/2018 COLONOSCOPY 8/12/2019 DTaP/Tdap/Td series (2 - Td) 10/17/2021 *Topic was postponed. The date shown is not the original due date. Allergies as of 12/12/2017  Review Complete On: 12/12/2017 By: Teresa Bain No Known Allergies Current Immunizations  Never Reviewed Name Date Influenza Vaccine 9/3/2014, 11/8/2012 Pneumococcal Conjugate (PCV-13) 6/10/2013 Pneumococcal Polysaccharide (PPSV-23) 1/22/2009 Pneumococcal Vaccine (Unspecified Type) 3/19/2014 Tdap 10/17/2011 Not reviewed this visit You Were Diagnosed With   
  
 Codes Comments Type 2 diabetes mellitus without complication, unspecified long term insulin use status (HCC)    -  Primary ICD-10-CM: E11.9 ICD-9-CM: 250.00 Essential hypertension     ICD-10-CM: I10 
ICD-9-CM: 401.9 Allergic condition     ICD-10-CM: Z88.9 ICD-9-CM: V15.09 Vitals BP Pulse Temp Resp Height(growth percentile) Weight(growth percentile) 125/72 (BP 1 Location: Right arm, BP Patient Position: Sitting) 70 97.3 °F (36.3 °C) (Oral) 14 6' 2\" (1.88 m) 196 lb (88.9 kg) SpO2 BMI Smoking Status 100% 25.16 kg/m2 Former Smoker Vitals History BMI and BSA Data Body Mass Index Body Surface Area  
 25.16 kg/m 2 2.15 m 2 Preferred Pharmacy Pharmacy Name Phone [address] [place] [address] [address] [facility] [address] [phone] Your Updated Medication List  
  
   
This list is accurate as of: 12/12/17 10:07 AM.  Always use your most recent med list.  
  
  
  
  
 ascorbic acid (vitamin C) 500 mg tablet Commonly known as:  VITAMIN C Take 500 mg by mouth daily. BIOTIN PO Take  by mouth. brimonidine 0.1 % ophthalmic solution Commonly known as:  ALPHAGAN P  
every eight (8) hours. didanosine 400 mg capsule Commonly known as:  VIDEX EC Take 400 mg by mouth daily. magnesium 250 mg Tab Take  by mouth.  
  
 mometasone 50 mcg/actuation nasal spray Commonly known as:  NASONEX  
2 Sprays by Both Nostrils route daily. montelukast 10 mg tablet Commonly known as:  SINGULAIR Take 1 Tab by mouth daily. Indications: Allergic Rhinitis  
  
 multivitamin tablet Commonly known as:  ONE A DAY Take 1 Tab by mouth daily. omega-3 fatty acids-vitamin e 1,000 mg Cap Take 1 Cap by mouth. OTHER Take  by mouth daily. Triumebq  
  
 rosuvastatin 5 mg tablet Commonly known as:  CRESTOR Take 5 mg by mouth nightly. timolol 0.5 % ophthalmic solution Commonly known as:  TIMOPTIC Administer 1 Drop to both eyes two (2) times a day. tiZANidine 4 mg tablet Commonly known as:  Kathleen Perfect TAKE 1 TABLET THREE TIMES DAILY AS NEEDED FOR MUSCLE SPASM  
  
 triamcinolone acetonide 0.025 % lotion Apply  to affected area two (2) times a day. Prescriptions Sent to Pharmacy Refills  
 tiZANidine (ZANAFLEX) 4 mg tablet 0 Sig: TAKE 1 TABLET THREE TIMES DAILY AS NEEDED FOR MUSCLE SPASM Class: Normal  
 Pharmacy: [address] [place] [address], [address] Ph #: [phone]  
 montelukast (SINGULAIR) 10 mg tablet 3 Sig: Take 1 Tab by mouth daily. Indications: Allergic Rhinitis  Class: Normal  
 Pharmacy: [address] [place] [address], [address] [place] [address] #: [phone] Route: Oral  
  
Follow-up Instructions Return in about 1 month (around 1/12/2018), or if symptoms worsen or fail to improve. To-Do List   
 12/12/2017 Lab:  HEMOGLOBIN A1C WITH EAG   
  
 12/12/2017 Lab:  LIPID PANEL   
  
 12/12/2017 Lab:  METABOLIC PANEL, COMPREHENSIVE   
  
 12/12/2017 Lab:  TSH 3RD GENERATION Patient Instructions Montelukast (By mouth) Montelukast (pzv-st-WVZ-kast) Prevents asthma attacks and treats allergies. Brand Name(s): Singulair There may be other brand names for this medicine. When This Medicine Should Not Be Used: This medicine is not right for everyone. Do not use it if you had an allergic reaction to montelukast. 
How to Use This Medicine:  
Packet, Tablet, Chewable Tablet · Your doctor will tell you how much medicine to use. Do not use more than directed. · Read and follow the patient instructions that come with this medicine. Talk to your doctor or pharmacist if you have any questions. · Oral granules: Do not open the packet until you are ready to use it. You can give the oral granules in one of three ways. ¨ Put the oral granules directly on a spoon, then into the person's mouth. ¨ Mix the granules with baby formula or breast milk that is cold or room temperature. Do not mix the granules with any other liquid. ¨ Mix the granules with applesauce, mashed carrots, rice, or ice cream. Do not mix the granules with any other type of soft food. ¨ If the medicine is mixed with formula, breast milk, or food, use it right away. Do not save any mixed medicine to use later. · Missed dose: Take a dose as soon as you remember. If it is almost time for your next dose, wait until then and take a regular dose. Do not take extra medicine to make up for a missed dose.  
· Store the medicine in the original container at room temperature, away from heat and direct light. Drugs and Foods to Avoid: Ask your doctor or pharmacist before using any other medicine, including over-the-counter medicines, vitamins, and herbal products. Warnings While Using This Medicine: · Tell your doctor if you are pregnant or breastfeeding, or if you have liver disease, have a condition called phenylketonuria (PKU), or are allergic to aspirin. · This medicine will not stop an asthma attack that has already started. Your doctor may prescribe another medicine for a sudden asthma attack. · If you use a corticosteroid medicine to control your asthma, keep using it as instructed by your doctor. · If any of your asthma medicines do not seem to be working as well as usual, call your doctor right away. Do not change your doses or stop using your medicines without asking your doctor. · This medicine may cause some people to be agitated, disoriented, irritable, or reckless. It may also cause depression or suicidal thoughts. Tell your doctor if you have any unusual thoughts or behaviors that trouble you. · This medicine may make you dizzy or drowsy. Do not drive or do anything else that could be dangerous until you know how this medicine affects you. · Keep all medicine out of the reach of children. Never share your medicine with anyone. Possible Side Effects While Using This Medicine:  
Call your doctor right away if you notice any of these side effects: · Allergic reaction: Itching or hives, swelling in your face or hands, swelling or tingling in your mouth or throat, chest tightness, trouble breathing · Blistering, peeling, red skin rash · Chest pain, or a fast, pounding, or uneven heartbeat · Numbness or tingling in the hands, arms, legs, or feet · Pain and swelling in your sinuses · Restlessness, anxiety, irritability, mood or behavior changes, or thoughts of hurting yourself or others · Sudden and severe stomach pain, nausea, vomiting, fever, lightheadedness · Unusual bleeding or bruising If you notice these less serious side effects, talk with your doctor: · Cough, sore throat, or flu symptoms · Headache If you notice other side effects that you think are caused by this medicine, tell your doctor. Call your doctor for medical advice about side effects. You may report side effects to FDA at 8-231-KEQ-7338 © 2017 [address] Information is for End User's use only and may not be sold, redistributed or otherwise used for commercial purposes. The above information is an  only. It is not intended as medical advice for individual conditions or treatments. Talk to your doctor, nurse or pharmacist before following any medical regimen to see if it is safe and effective for you. Introducing \[facility]\"" & HEALTH SERVICES! Deny Cunha introduces [employer] patient portal. Now you can access parts of your medical record, email your doctor's office, and request medication refills online. 1. In your internet browser, go to [email or web address] 2. Click on the First Time User? Click Here link in the Sign In box. You will see the New Member Sign Up page. 3. Enter your [employer] Access Code exactly as it appears below. You will not need to use this code after youve completed the sign-up process. If you do not sign up before the expiration date, you must request a new code. · [employer] Access Code: [email or web address] Expires: 12/22/2017  9:52 AM 
 
4. Enter the last four digits of your Social Security Number (xxxx) and Date of Birth (mm/dd/yyyy) as indicated and click Submit. You will be taken to the next sign-up page. 5. Create a [employer] ID. This will be your [employer] login ID and cannot be changed, so think of one that is secure and easy to remember. 6. Create a [employer] password. You can change your password at any time. 7. Enter your Password Reset Question and Answer. This can be used at a later time if you forget your password. 8. Enter your e-mail address. You will receive e-mail notification when new information is available in [address] 9. Click Sign Up. You can now view and download portions of your medical record. 10. Click the Download Summary menu link to download a portable copy of your medical information. If you have questions, please visit the Frequently Asked Questions section of the [email or web address] website. Remember, [email or web address] is NOT to be used for urgent needs. For medical emergencies, dial 911. Now available from your iPhone and Android! Please provide this summary of care documentation to your next provider. Your primary care clinician is listed as Miller Kimball. If you have any questions after today's visit, please call [phone].

## 2017-12-12 NOTE — PATIENT INSTRUCTIONS
Montelukast (By mouth)   Montelukast ([id number]-bk-RSH-kast)  Prevents asthma attacks and treats allergies. Brand Name(s): Singulair   There may be other brand names for this medicine. When This Medicine Should Not Be Used: This medicine is not right for everyone. Do not use it if you had an allergic reaction to montelukast.  How to Use This Medicine:   Packet, Tablet, Chewable Tablet  · Your doctor will tell you how much medicine to use. Do not use more than directed. · Read and follow the patient instructions that come with this medicine. Talk to your doctor or pharmacist if you have any questions. · Oral granules: Do not open the packet until you are ready to use it. You can give the oral granules in one of three ways. ¨ Put the oral granules directly on a spoon, then into the person's mouth. ¨ Mix the granules with baby formula or breast milk that is cold or room temperature. Do not mix the granules with any other liquid. ¨ Mix the granules with applesauce, mashed carrots, rice, or ice cream. Do not mix the granules with any other type of soft food. ¨ If the medicine is mixed with formula, breast milk, or food, use it right away. Do not save any mixed medicine to use later. · Missed dose: Take a dose as soon as you remember. If it is almost time for your next dose, wait until then and take a regular dose. Do not take extra medicine to make up for a missed dose. · Store the medicine in the original container at room temperature, away from heat and direct light. Drugs and Foods to Avoid:      Ask your doctor or pharmacist before using any other medicine, including over-the-counter medicines, vitamins, and herbal products. Warnings While Using This Medicine:   · Tell your doctor if you are pregnant or breastfeeding, or if you have liver disease, have a condition called phenylketonuria (PKU), or are allergic to aspirin. · This medicine will not stop an asthma attack that has already started.  Your doctor may prescribe another medicine for a sudden asthma attack. · If you use a corticosteroid medicine to control your asthma, keep using it as instructed by your doctor. · If any of your asthma medicines do not seem to be working as well as usual, call your doctor right away. Do not change your doses or stop using your medicines without asking your doctor. · This medicine may cause some people to be agitated, disoriented, irritable, or reckless. It may also cause depression or suicidal thoughts. Tell your doctor if you have any unusual thoughts or behaviors that trouble you. · This medicine may make you dizzy or drowsy. Do not drive or do anything else that could be dangerous until you know how this medicine affects you. · Keep all medicine out of the reach of children. Never share your medicine with anyone. Possible Side Effects While Using This Medicine:   Call your doctor right away if you notice any of these side effects:  · Allergic reaction: Itching or hives, swelling in your face or hands, swelling or tingling in your mouth or throat, chest tightness, trouble breathing  · Blistering, peeling, red skin rash  · Chest pain, or a fast, pounding, or uneven heartbeat  · Numbness or tingling in the hands, arms, legs, or feet  · Pain and swelling in your sinuses  · Restlessness, anxiety, irritability, mood or behavior changes, or thoughts of hurting yourself or others  · Sudden and severe stomach pain, nausea, vomiting, fever, lightheadedness  · Unusual bleeding or bruising  If you notice these less serious side effects, talk with your doctor:   · Cough, sore throat, or flu symptoms  · Headache  If you notice other side effects that you think are caused by this medicine, tell your doctor. Call your doctor for medical advice about side effects.  You may report side effects to FDA at 9-087-FDA-1887  © 2017 [address] [employer] Information is for End User's use only and may not be sold, redistributed or otherwise used for commercial purposes. The above information is an  only. It is not intended as medical advice for individual conditions or treatments. Talk to your doctor, nurse or pharmacist before following any medical regimen to see if it is safe and effective for you.

## 2017-12-12 NOTE — PROGRESS NOTES
ROOM # [id number]    Eve Lainez presents today for   Chief Complaint   Patient presents with    Hypertension    Diabetes       Eve Lainez preferred language for health care discussion is english/other. Is someone accompanying this pt? no    Is the patient using any DME equipment during OV? no    Depression Screening:  PHQ over the last two weeks 8/9/2017 5/23/2017 9/22/2016   Little interest or pleasure in doing things Not at all Not at all Not at all   Feeling down, depressed or hopeless Not at all Not at all Not at all   Total Score PHQ 2 0 0 0       Learning Assessment:  Learning Assessment 8/9/2017 9/22/2016 7/8/2016   PRIMARY LEARNER Patient Patient Patient   HIGHEST LEVEL OF EDUCATION - PRIMARY LEARNER  4 YEARS OF COLLEGE 4 [address] CAREGIVER - No -   PRIMARY LANGUAGE ENGLISH ENGLISH ENGLISH   LEARNER PREFERENCE PRIMARY DEMONSTRATION DEMONSTRATION DEMONSTRATION     LISTENING - -     VIDEOS - -   ANSWERED BY patient patient self   RELATIONSHIP SELF SELF SELF       Abuse Screening:  Abuse Screening Questionnaire 9/22/2016   Do you ever feel afraid of your partner? N   Are you in a relationship with someone who physically or mentally threatens you? N   Is it safe for you to go home? Y       Health Maintenance reviewed and discussed per provider. Yes    Eev Lainez is due for eye exam (has appt in two weeks), influenza - declined. Please order/place referral if appropriate. Advance Directive:  1. Do you have an advance directive in place? Patient Reply: no    2. If not, would you like material regarding how to put one in place? Patient Reply: no    Coordination of Care:  1. Have you been to the ER, urgent care clinic since your last visit? Hospitalized since your last visit? no    2. Have you seen or consulted any other health care providers outside of the [address] [place] [address] since your last visit?  Include any pap smears or colon screening. Yes, infectious disease - they stopped his lisinopril they think his cough is contributed to it. Please see Red banners under Allergies, Med rec, Immunizations to remove outside inquires. All correct information has been verified with patient and added to chart.

## 2017-12-12 NOTE — PROGRESS NOTES
HISTORY OF PRESENT ILLNESS  Reza Queen is a 59 y.o. male. HPI Comments: 60 yo male here for f/u of DM, HTN. He has lost significant weight with dietary changes. Had been on lisinopril but this was held by his ID doctor due to cough last week. Feels this is a little better today. Does note continued allergy sx. Has been using nasonex. Does not like to take antihistamines due to sedation with these. Hypertension    Pertinent negatives include no chest pain, no palpitations, no blurred vision, no headaches, no dizziness, no shortness of breath, no nausea and no vomiting. Diabetes   Pertinent negatives include no chest pain, no headaches and no shortness of breath. Review of Systems   Constitutional: Negative for chills and fever. HENT: Positive for congestion. Negative for ear pain. Eyes: Negative for blurred vision and pain. Respiratory: Negative for cough and shortness of breath. Cardiovascular: Negative for chest pain, palpitations and leg swelling. Gastrointestinal: Negative for nausea and vomiting. Genitourinary: Negative for frequency and urgency. Musculoskeletal: Negative for joint pain and myalgias. Skin: Negative for itching and rash. Neurological: Negative for dizziness, tingling and headaches. Psychiatric/Behavioral: Negative for depression. The patient is not nervous/anxious. Past Medical History:   Diagnosis Date    Arthritis     established with Dr. Lukasz Delaney chiropractor    Autoimmune disease ([facility] [address]     Blindness     left eye; and periperal vision is absent in R    Diabetes [facility])    Tori Burch Consultant Dr. Volodymyr Pearl HIV (human immunodeficiency virus infection) [facility]) dx Reta Hernandez Ms. Ramos; viral loads undetectable for 13 yrs    Hypercholesterolemia     Hypertension      Current Outpatient Prescriptions on File Prior to Visit   Medication Sig Dispense Refill    magnesium 250 mg tab Take  by mouth.  timolol (TIMOPTIC) 0.5 % ophthalmic solution Administer 1 Drop to both eyes two (2) times a day.  mometasone (NASONEX) 50 mcg/actuation nasal spray 2 Sprays by Both Nostrils route daily. 3 Container 3    brimonidine (ALPHAGAN P) 0.1 % ophthalmic solution every eight (8) hours.  OTHER Take  by mouth daily. Triumebq       rosuvastatin (CRESTOR) 5 mg tablet Take 5 mg by mouth nightly.  triamcinolone acetonide 0.025 % lotion Apply  to affected area two (2) times a day.  didanosine (VIDEX EC) 400 mg capsule Take 400 mg by mouth daily.  omega-3 fatty acids-vitamin e 1,000 mg cap Take 1 Cap by mouth.  multivitamin (ONE A DAY) tablet Take 1 Tab by mouth daily.  ascorbic acid (VITAMIN C) 500 mg tablet Take 500 mg by mouth daily.  BIOTIN PO Take  by mouth. No current facility-administered medications on file prior to visit. Social History   Substance Use Topics    Smoking status: Former Smoker     Quit date: 7/8/2006    Smokeless tobacco: Never Used      Comment: smoked for 30 years: 1/2 ppd.  Alcohol use No     Physical Exam   Constitutional: He appears well-developed and well-nourished. No distress. /72 (BP 1 Location: Right arm, BP Patient Position: Sitting)  Pulse 70  Temp 97.3 °F (36.3 °C) (Oral)   Resp 14  Ht 6' 2\" (1.88 m)  Wt 196 lb (88.9 kg) Comment: has been working on diet - cut out carbs  SpO2 100%  BMI 25.16 kg/m2     HENT:   Right Ear: Tympanic membrane and ear canal normal.   Left Ear: Tympanic membrane and ear canal normal.   Nose: No mucosal edema or rhinorrhea. Right sinus exhibits no maxillary sinus tenderness and no frontal sinus tenderness. Left sinus exhibits no maxillary sinus tenderness and no frontal sinus tenderness. Mouth/Throat: No oropharyngeal exudate or posterior oropharyngeal erythema. Eyes: EOM are normal. Right eye exhibits no discharge. Left eye exhibits no discharge. No scleral icterus. Neck: Neck supple. Cardiovascular: Normal rate, regular rhythm and normal heart sounds. Exam reveals no gallop and no friction rub. No murmur heard. Pulmonary/Chest: Effort normal and breath sounds normal. No respiratory distress. He has no wheezes. He has no rales. Musculoskeletal: He exhibits no edema or tenderness. Lymphadenopathy:     He has no cervical adenopathy. Neurological: He is alert. He exhibits normal muscle tone. Skin: Skin is warm and dry. Psychiatric: He has a normal mood and affect. Lab Results   Component Value Date/Time    Hemoglobin A1c 6.8 05/23/2017 09:00 AM    Hemoglobin A1c (POC) 5.7 08/09/2017 10:05 AM    Hemoglobin A1c, External 6.4 01/17/2017     Lab Results   Component Value Date/Time    Sodium 141 09/15/2016 09:02 AM    Potassium 4.6 09/15/2016 09:02 AM    Chloride 99 09/15/2016 09:02 AM    CO2 29 09/15/2016 09:02 AM    Anion gap 13.0 09/15/2016 09:02 AM    Glucose 106 09/15/2016 09:02 AM    BUN 14 09/15/2016 09:02 AM    Creatinine 1.1 09/15/2016 09:02 AM    Calcium 9.5 09/15/2016 09:02 AM    Bilirubin, total 0.6 09/15/2016 09:02 AM    AST (SGOT) 20 09/15/2016 09:02 AM    Alk. phosphatase 85 09/15/2016 09:02 AM    Protein, total 7.1 09/15/2016 09:02 AM    Albumin 4.2 09/15/2016 09:02 AM    Globulin 2.9 09/15/2016 09:02 AM    A-G Ratio 1.4 09/15/2016 09:02 AM    ALT (SGPT) 23 09/15/2016 09:02 AM     Lab Results   Component Value Date/Time    Cholesterol, total 159 09/15/2016 09:02 AM    HDL Cholesterol 49 09/15/2016 09:02 AM    LDL, calculated 91 09/15/2016 09:02 AM    VLDL, calculated 18 09/15/2016 09:02 AM    Triglyceride 91 09/15/2016 09:02 AM     ASSESSMENT and PLAN    ICD-10-CM ICD-9-CM    1. Type 2 diabetes mellitus without complication, unspecified long term insulin use status (HCC) E11.9 250.00 HEMOGLOBIN A1C WITH EAG      LIPID PANEL      TSH 3RD GENERATION   2. Essential hypertension J27 197.8 METABOLIC PANEL, COMPREHENSIVE   3.  Allergic condition Z88.9 V15.09    Repeat labs today.   BP well controlled since intentional weight loss. Will continue to hold lisinopril as cough improved somewhat. Will try singulair for allergy sx.

## 2017-12-13 LAB
ALBUMIN SERPL-MCNC: 4.2 G/DL (ref 3.5–5.5)
ALBUMIN/GLOB SERPL: 1.8 {RATIO} (ref 1.2–2.2)
ALP SERPL-CCNC: 83 IU/L (ref 39–117)
ALT SERPL-CCNC: 15 IU/L (ref 0–44)
AST SERPL-CCNC: 14 IU/L (ref 0–40)
BILIRUB SERPL-MCNC: 0.6 MG/DL (ref 0–1.2)
BUN SERPL-MCNC: 10 MG/DL (ref 6–24)
BUN/CREAT SERPL: 10 (ref 9–20)
CALCIUM SERPL-MCNC: 9.3 MG/DL (ref 8.7–10.2)
CHLORIDE SERPL-SCNC: 101 MMOL/L (ref 96–106)
CHOLEST SERPL-MCNC: 131 MG/DL (ref 100–199)
CO2 SERPL-SCNC: 28 MMOL/L (ref 18–29)
CREAT SERPL-MCNC: 1.03 MG/DL (ref 0.76–1.27)
EST. AVERAGE GLUCOSE BLD GHB EST-MCNC: 111 MG/DL
GFR SERPLBLD CREATININE-BSD FMLA CKD-EPI: 79 ML/MIN/1.73
GFR SERPLBLD CREATININE-BSD FMLA CKD-EPI: 91 ML/MIN/1.73
GLOBULIN SER CALC-MCNC: 2.4 G/DL (ref 1.5–4.5)
GLUCOSE SERPL-MCNC: 93 MG/DL (ref 65–99)
HBA1C MFR BLD: 5.5 % (ref 4.8–5.6)
HDLC SERPL-MCNC: 63 MG/DL
INTERPRETATION, 910389: NORMAL
LDLC SERPL CALC-MCNC: 61 MG/DL (ref 0–99)
POTASSIUM SERPL-SCNC: 4.5 MMOL/L (ref 3.5–5.2)
PROT SERPL-MCNC: 6.6 G/DL (ref 6–8.5)
SODIUM SERPL-SCNC: 140 MMOL/L (ref 134–144)
TRIGL SERPL-MCNC: 35 MG/DL (ref 0–149)
TSH SERPL DL<=0.005 MIU/L-ACNC: 2.02 UIU/ML (ref 0.45–4.5)
VLDLC SERPL CALC-MCNC: 7 MG/DL (ref 5–40)

## 2018-01-12 ENCOUNTER — OFFICE VISIT (OUTPATIENT)
Dept: INTERNAL MEDICINE CLINIC | Age: 58
End: 2018-01-12

## 2018-01-12 VITALS
DIASTOLIC BLOOD PRESSURE: 80 MMHG | RESPIRATION RATE: 16 BRPM | HEART RATE: 68 BPM | TEMPERATURE: 96 F | BODY MASS INDEX: 25.38 KG/M2 | WEIGHT: 197.8 LBS | SYSTOLIC BLOOD PRESSURE: 146 MMHG | HEIGHT: 74 IN

## 2018-01-12 DIAGNOSIS — I10 ESSENTIAL HYPERTENSION: Primary | ICD-10-CM

## 2018-01-12 DIAGNOSIS — E11.9 TYPE 2 DIABETES MELLITUS WITHOUT COMPLICATION, WITHOUT LONG-TERM CURRENT USE OF INSULIN (HCC): ICD-10-CM

## 2018-01-12 RX ORDER — MONTELUKAST SODIUM 10 MG/1
10 TABLET ORAL DAILY
Qty: 90 TAB | Refills: 3 | Status: SHIPPED | OUTPATIENT
Start: 2018-01-12 | End: 2018-04-12 | Stop reason: SDUPTHER

## 2018-01-12 NOTE — MR AVS SNAPSHOT
Visit Information Date & Time Provider Department Dept. Phone Encounter #  
 1/12/2018  9:30 AM [address] [phone] [id number] Follow-up Instructions Return in about 3 months (around 4/12/2018), or if symptoms worsen or fail to improve, for hypertension. Your Appointments 1/12/2018  9:30 AM  
Office Visit with MD CL[facility]IBEL [facility]) Appt Note: 1 month f/u; [address] due  05/2018  
 [address]  
  
   
 [address] [facility] Upcoming Health Maintenance Date Due Influenza Age 5 to Adult 8/1/2017 MICROALBUMIN Q1 1/17/2018 EYE EXAM RETINAL OR DILATED Q1 2/28/2018* MEDICARE YEARLY EXAM 5/24/2018 FOOT EXAM Q1 6/7/2018 HEMOGLOBIN A1C Q6M 6/12/2018 LIPID PANEL Q1 12/12/2018 COLONOSCOPY 8/12/2019 DTaP/Tdap/Td series (2 - Td) 10/17/2021 *Topic was postponed. The date shown is not the original due date. Allergies as of 1/12/2018  Review Complete On: 1/12/2018 By: Latonia Key MD  
 No Known Allergies Current Immunizations  Never Reviewed Name Date Influenza Vaccine 9/3/2014, 11/8/2012 Pneumococcal Conjugate (PCV-13) 6/10/2013 Pneumococcal Polysaccharide (PPSV-23) 1/22/2009 Pneumococcal Vaccine (Unspecified Type) 3/19/2014 Tdap 10/17/2011 Not reviewed this visit You Were Diagnosed With   
  
 Codes Comments Essential hypertension    -  Primary ICD-10-CM: I10 
ICD-9-CM: 401.9 Type 2 diabetes mellitus without complication, without long-term current use of insulin (HCC)     ICD-10-CM: E11.9 ICD-9-CM: 250.00 Vitals BP Pulse Temp Resp Height(growth percentile) Weight(growth percentile) 146/80 (BP 1 Location: Right arm, BP Patient Position: Sitting) 68 96 °F (35.6 °C) 16 6' 2\" (1.88 m) 197 lb 12.8 oz (89.7 kg) BMI Smoking Status 25.4 kg/m2 Former Smoker Vitals History BMI and BSA Data Body Mass Index Body Surface Area  
 25.4 kg/m 2 2.16 m 2 Preferred Pharmacy Pharmacy Name Phone [facility] [place] [facility] [address] [phone] Your Updated Medication List  
  
   
This list is accurate as of: 1/12/18  9:18 AM.  Always use your most recent med list.  
  
  
  
  
 abacavir-dolutegravir-lamiVUDine tablet Commonly known as:  Garrett Bridges Take 1 Tab by mouth daily. ascorbic acid (vitamin C) 500 mg tablet Commonly known as:  VITAMIN C Take 500 mg by mouth daily. BIOTIN PO Take  by mouth. brimonidine 0.1 % ophthalmic solution Commonly known as:  ALPHAGAN P  
every eight (8) hours. didanosine 400 mg capsule Commonly known as:  VIDEX EC Take 400 mg by mouth daily. magnesium 250 mg Tab Take  by mouth.  
  
 mometasone 50 mcg/actuation nasal spray Commonly known as:  NASONEX  
2 Sprays by Both Nostrils route daily. montelukast 10 mg tablet Commonly known as:  SINGULAIR Take 1 Tab by mouth daily. Indications: Allergic Rhinitis  
  
 multivitamin tablet Commonly known as:  ONE A DAY Take 1 Tab by mouth daily. omega-3 fatty acids-vitamin e 1,000 mg Cap Take 1 Cap by mouth. OTHER Take  by mouth daily. Triumebq  
  
 rosuvastatin 5 mg tablet Commonly known as:  CRESTOR Take 5 mg by mouth nightly. tenofovir ALAFENAMIDE FUMARATE 25 mg Tab Commonly known as:  VEMLIDY Take 25 mg by mouth daily. timolol 0.5 % ophthalmic solution Commonly known as:  TIMOPTIC Administer 1 Drop to both eyes two (2) times a day. tiZANidine 4 mg tablet Commonly known as:  O[place]da Care TAKE 1 TABLET THREE TIMES DAILY AS NEEDED FOR MUSCLE SPASM  
  
 triamcinolone acetonide 0.025 % lotion Apply  to affected area two (2) times a day. Follow-up Instructions Return in about 3 months (around 4/12/2018), or if symptoms worsen or fail to improve, for hypertension. Patient Instructions DASH Diet: Care Instructions Your Care Instructions The DASH diet is an eating plan that can help lower your blood pressure. DASH stands for Dietary Approaches to Stop Hypertension. Hypertension is high blood pressure. The DASH diet focuses on eating foods that are high in calcium, potassium, and magnesium. These nutrients can lower blood pressure. The foods that are highest in these nutrients are fruits, vegetables, low-fat dairy products, nuts, seeds, and legumes. But taking calcium, potassium, and magnesium supplements instead of eating foods that are high in those nutrients does not have the same effect. The DASH diet also includes whole grains, fish, and poultry. The DASH diet is one of several lifestyle changes your doctor may recommend to lower your high blood pressure. Your doctor may also want you to decrease the amount of sodium in your diet. Lowering sodium while following the DASH diet can lower blood pressure even further than just the DASH diet alone. Follow-up care is a key part of your treatment and safety. Be sure to make and go to all appointments, and call your doctor if you are having problems. It's also a good idea to know your test results and keep a list of the medicines you take. How can you care for yourself at home? Following the DASH diet · Eat 4 to 5 servings of fruit each day. A serving is 1 medium-sized piece of fruit, ½ cup chopped or canned fruit, 1/4 cup dried fruit, or 4 ounces (½ cup) of fruit juice. Choose fruit more often than fruit juice. · Eat 4 to 5 servings of vegetables each day. A serving is 1 cup of lettuce or raw leafy vegetables, ½ cup of chopped or cooked vegetables, or 4 ounces (½ cup) of vegetable juice. Choose vegetables more often than vegetable juice. · Get 2 to 3 servings of low-fat and fat-free dairy each day. A serving is 8 ounces of milk, 1 cup of yogurt, or 1 ½ ounces of cheese. · Eat 6 to 8 servings of grains each day. A serving is 1 slice of bread, 1 ounce of dry cereal, or ½ cup of cooked rice, pasta, or cooked cereal. Try to choose whole-grain products as much as possible. · Limit lean meat, poultry, and fish to 2 servings each day. A serving is 3 ounces, about the size of a deck of cards. · Eat 4 to 5 servings of nuts, seeds, and legumes (cooked dried beans, lentils, and split peas) each week. A serving is 1/3 cup of nuts, 2 tablespoons of seeds, or ½ cup of cooked beans or peas. · Limit fats and oils to 2 to 3 servings each day. A serving is 1 teaspoon of vegetable oil or 2 tablespoons of salad dressing. · Limit sweets and added sugars to 5 servings or less a week. A serving is 1 tablespoon jelly or jam, ½ cup sorbet, or 1 cup of lemonade. · Eat less than 2,300 milligrams (mg) of sodium a day. If you limit your sodium to 1,500 mg a day, you can lower your blood pressure even more. Tips for success · Start small. Do not try to make dramatic changes to your diet all at once. You might feel that you are missing out on your favorite foods and then be more likely to not follow the plan. Make small changes, and stick with them. Once those changes become habit, add a few more changes. · Try some of the following: ¨ Make it a goal to eat a fruit or vegetable at every meal and at snacks. This will make it easy to get the recommended amount of fruits and vegetables each day. ¨ Try yogurt topped with fruit and nuts for a snack or healthy dessert. ¨ Add lettuce, tomato, cucumber, and onion to sandwiches. ¨ Combine a ready-made pizza crust with low-fat mozzarella cheese and lots of vegetable toppings. Try using tomatoes, squash, spinach, broccoli, carrots, cauliflower, and onions. ¨ Have a variety of cut-up vegetables with a low-fat dip as an appetizer instead of chips and dip. ¨ Sprinkle sunflower seeds or chopped almonds over salads. Or try adding chopped walnuts or almonds to cooked vegetables. ¨ Try some vegetarian meals using beans and peas. Add garbanzo or kidney beans to salads. Make burritos and tacos with mashed gutiérrez beans or black beans. Where can you learn more? Go to http://adela-mily.info/. Enter J298 in the search box to learn more about \"DASH Diet: Care Instructions. \" Current as of: September 21, 2016 Content Version: 11.4 © 6152-3420 [employer]. Care instructions adapted under license by [employer] (which disclaims liability or warranty for this information). If you have questions about a medical condition or this instruction, always ask your healthcare professional. [place] [address] any warranty or liability for your use of this information. Low Sodium Diet (2,000 Milligram): Care Instructions Your Care Instructions Too much sodium causes your body to hold on to extra water. This can raise your blood pressure and force your heart and kidneys to work harder. In very serious cases, this could cause you to be put in the hospital. It might even be life-threatening. By limiting sodium, you will feel better and lower your risk of serious problems. The most common source of sodium is salt. People get most of the salt in their diet from canned, prepared, and packaged foods. Fast food and restaurant meals also are very high in sodium. Your doctor will probably limit your sodium to less than 2,000 milligrams (mg) a day. This limit counts all the sodium in prepared and packaged foods and any salt you add to your food. Follow-up care is a key part of your treatment and safety.  Be sure to make and go to all appointments, and call your doctor if you are having problems. It's also a good idea to know your test results and keep a list of the medicines you take. How can you care for yourself at home? Read food labels · Read labels on cans and food packages. The labels tell you how much sodium is in each serving. Make sure that you look at the serving size. If you eat more than the serving size, you have eaten more sodium. · Food labels also tell you the Percent Daily Value for sodium. Choose products with low Percent Daily Values for sodium. · Be aware that sodium can come in forms other than salt, including monosodium glutamate (MSG), sodium citrate, and sodium bicarbonate (baking soda). MSG is often added to Asian food. When you eat out, you can sometimes ask for food without MSG or added salt. Buy low-sodium foods · Buy foods that are labeled \"unsalted\" (no salt added), \"sodium-free\" (less than 5 mg of sodium per serving), or \"low-sodium\" (less than 140 mg of sodium per serving). Foods labeled \"reduced-sodium\" and \"light sodium\" may still have too much sodium. Be sure to read the label to see how much sodium you are getting. · Buy fresh vegetables, or frozen vegetables without added sauces. Buy low-sodium versions of canned vegetables, soups, and other canned goods. Prepare low-sodium meals · Cut back on the amount of salt you use in cooking. This will help you adjust to the taste. Do not add salt after cooking. One teaspoon of salt has about 2,300 mg of sodium. · Take the salt shaker off the table. · Flavor your food with garlic, lemon juice, onion, vinegar, herbs, and spices. Do not use soy sauce, lite soy sauce, steak sauce, onion salt, garlic salt, celery salt, mustard, or ketchup on your food. · Use low-sodium salad dressings, sauces, and ketchup. Or make your own salad dressings and sauces without adding salt. · Use less salt (or none) when recipes call for it.  You can often use half the salt a recipe calls for without losing flavor. Other foods such as rice, pasta, and grains do not need added salt. · Rinse canned vegetables, and cook them in fresh water. This removes some-but not all-of the salt. · Avoid water that is naturally high in sodium or that has been treated with water softeners, which add sodium. Call your local water company to find out the sodium content of your water supply. If you buy bottled water, read the label and choose a sodium-free brand. Avoid high-sodium foods · Avoid eating: ¨ Smoked, cured, salted, and canned meat, fish, and poultry. ¨ Ham, dyer, hot dogs, and luncheon meats. ¨ Regular, hard, and processed cheese and regular peanut butter. ¨ Crackers with salted tops, and other salted snack foods such as pretzels, chips, and salted popcorn. ¨ Frozen prepared meals, unless labeled low-sodium. ¨ Canned and dried soups, broths, and bouillon, unless labeled sodium-free or low-sodium. ¨ Canned vegetables, unless labeled sodium-free or low-sodium. ¨ [place] fries, pizza, tacos, and other fast foods. ¨ Pickles, olives, ketchup, and other condiments, especially soy sauce, unless labeled sodium-free or low-sodium. Where can you learn more? Go to http://adela-mily.info/. Enter M926 in the search box to learn more about \"Low Sodium Diet (2,000 Milligram): Care Instructions. \" Current as of: May 12, 2017 Content Version: 11.4 © 5582-8016 [employer]. Care instructions adapted under license by [employer] (which disclaims liability or warranty for this information). If you have questions about a medical condition or this instruction, always ask your healthcare professional. [address]  any warranty or liability for your use of this information. Introducing [facility]!    
 Arnold Tom introduces [employer] patient portal. Now you can access parts of your medical record, email your doctor's office, and request medication refills online. 1. In your internet browser, go to [email or web address] 2. Click on the First Time User? Click Here link in the Sign In box. You will see the New Member Sign Up page. 3. Enter your GoFormz Access Code exactly as it appears below. You will not need to use this code after youve completed the sign-up process. If you do not sign up before the expiration date, you must request a new code. · GoFormz Access Code: [email or web address] Expires: 4/12/2018  9:18 AM 
 
4. Enter the last four digits of your Social Security Number (xxxx) and Date of Birth (mm/dd/yyyy) as indicated and click Submit. You will be taken to the next sign-up page. 5. Create a GoFormz ID. This will be your GoFormz login ID and cannot be changed, so think of one that is secure and easy to remember. 6. Create a GoFormz password. You can change your password at any time. 7. Enter your Password Reset Question and Answer. This can be used at a later time if you forget your password. 8. Enter your e-mail address. You will receive e-mail notification when new information is available in [address] 9. Click Sign Up. You can now view and download portions of your medical record. 10. Click the Download Summary menu link to download a portable copy of your medical information. If you have questions, please visit the Frequently Asked Questions section of the GoFormz website. Remember, GoFormz is NOT to be used for urgent needs. For medical emergencies, dial 911. Now available from your iPhone and Android! Please provide this summary of care documentation to your next provider. Your primary care clinician is listed as [address] If you have any questions after today's visit, please call [phone].

## 2018-01-12 NOTE — PATIENT INSTRUCTIONS
DASH Diet: Care Instructions  Your Care Instructions    The DASH diet is an eating plan that can help lower your blood pressure. DASH stands for Dietary Approaches to Stop Hypertension. Hypertension is high blood pressure. The DASH diet focuses on eating foods that are high in calcium, potassium, and magnesium. These nutrients can lower blood pressure. The foods that are highest in these nutrients are fruits, vegetables, low-fat dairy products, nuts, seeds, and legumes. But taking calcium, potassium, and magnesium supplements instead of eating foods that are high in those nutrients does not have the same effect. The DASH diet also includes whole grains, fish, and poultry. The DASH diet is one of several lifestyle changes your doctor may recommend to lower your high blood pressure. Your doctor may also want you to decrease the amount of sodium in your diet. Lowering sodium while following the DASH diet can lower blood pressure even further than just the DASH diet alone. Follow-up care is a key part of your treatment and safety. Be sure to make and go to all appointments, and call your doctor if you are having problems. It's also a good idea to know your test results and keep a list of the medicines you take. How can you care for yourself at home? Following the DASH diet  · Eat 4 to 5 servings of fruit each day. A serving is 1 medium-sized piece of fruit, ½ cup chopped or canned fruit, 1/4 cup dried fruit, or 4 ounces (½ cup) of fruit juice. Choose fruit more often than fruit juice. · Eat 4 to 5 servings of vegetables each day. A serving is 1 cup of lettuce or raw leafy vegetables, ½ cup of chopped or cooked vegetables, or 4 ounces (½ cup) of vegetable juice. Choose vegetables more often than vegetable juice. · Get 2 to 3 servings of low-fat and fat-free dairy each day. A serving is 8 ounces of milk, 1 cup of yogurt, or 1 ½ ounces of cheese. · Eat 6 to 8 servings of grains each day.  A serving is 1 slice of bread, 1 ounce of dry cereal, or ½ cup of cooked rice, pasta, or cooked cereal. Try to choose whole-grain products as much as possible. · Limit lean meat, poultry, and fish to 2 servings each day. A serving is 3 ounces, about the size of a deck of cards. · Eat 4 to 5 servings of nuts, seeds, and legumes (cooked dried beans, lentils, and split peas) each week. A serving is 1/3 cup of nuts, 2 tablespoons of seeds, or ½ cup of cooked beans or peas. · Limit fats and oils to 2 to 3 servings each day. A serving is 1 teaspoon of vegetable oil or 2 tablespoons of salad dressing. · Limit sweets and added sugars to 5 servings or less a week. A serving is 1 tablespoon jelly or jam, ½ cup sorbet, or 1 cup of lemonade. · Eat less than 2,300 milligrams (mg) of sodium a day. If you limit your sodium to 1,500 mg a day, you can lower your blood pressure even more. Tips for success  · Start small. Do not try to make dramatic changes to your diet all at once. You might feel that you are missing out on your favorite foods and then be more likely to not follow the plan. Make small changes, and stick with them. Once those changes become habit, add a few more changes. · Try some of the following:  ¨ Make it a goal to eat a fruit or vegetable at every meal and at snacks. This will make it easy to get the recommended amount of fruits and vegetables each day. ¨ Try yogurt topped with fruit and nuts for a snack or healthy dessert. ¨ Add lettuce, tomato, cucumber, and onion to sandwiches. ¨ Combine a ready-made pizza crust with low-fat mozzarella cheese and lots of vegetable toppings. Try using tomatoes, squash, spinach, broccoli, carrots, cauliflower, and onions. ¨ Have a variety of cut-up vegetables with a low-fat dip as an appetizer instead of chips and dip. ¨ Sprinkle sunflower seeds or chopped almonds over salads. Or try adding chopped walnuts or almonds to cooked vegetables.   ¨ Try some vegetarian meals using beans and peas. Add garbanzo or kidney beans to salads. Make burritos and tacos with mashed gutiérrez beans or black beans. Where can you learn more? Go to http://adela-mily.info/. Enter C588 in the search box to learn more about \"DASH Diet: Care Instructions. \"  Current as of: September 21, 2016  Content Version: 11.4  © 4468-9109 [employer]. Care instructions adapted under license by [employer] (which disclaims liability or warranty for this information). If you have questions about a medical condition or this instruction, always ask your healthcare professional. [address] any warranty or liability for your use of this information. Low Sodium Diet (2,000 Milligram): Care Instructions  Your Care Instructions    Too much sodium causes your body to hold on to extra water. This can raise your blood pressure and force your heart and kidneys to work harder. In very serious cases, this could cause you to be put in the hospital. It might even be life-threatening. By limiting sodium, you will feel better and lower your risk of serious problems. The most common source of sodium is salt. People get most of the salt in their diet from canned, prepared, and packaged foods. Fast food and restaurant meals also are very high in sodium. Your doctor will probably limit your sodium to less than 2,000 milligrams (mg) a day. This limit counts all the sodium in prepared and packaged foods and any salt you add to your food. Follow-up care is a key part of your treatment and safety. Be sure to make and go to all appointments, and call your doctor if you are having problems. It's also a good idea to know your test results and keep a list of the medicines you take. How can you care for yourself at home? Read food labels  · Read labels on cans and food packages. The labels tell you how much sodium is in each serving. Make sure that you look at the serving size.  If you eat more than the serving size, you have eaten more sodium. · Food labels also tell you the Percent Daily Value for sodium. Choose products with low Percent Daily Values for sodium. · Be aware that sodium can come in forms other than salt, including monosodium glutamate (MSG), sodium citrate, and sodium bicarbonate (baking soda). MSG is often added to Asian food. When you eat out, you can sometimes ask for food without MSG or added salt. Buy low-sodium foods  · Buy foods that are labeled \"unsalted\" (no salt added), \"sodium-free\" (less than 5 mg of sodium per serving), or \"low-sodium\" (less than 140 mg of sodium per serving). Foods labeled \"reduced-sodium\" and \"light sodium\" may still have too much sodium. Be sure to read the label to see how much sodium you are getting. · Buy fresh vegetables, or frozen vegetables without added sauces. Buy low-sodium versions of canned vegetables, soups, and other canned goods. Prepare low-sodium meals  · Cut back on the amount of salt you use in cooking. This will help you adjust to the taste. Do not add salt after cooking. One teaspoon of salt has about 2,300 mg of sodium. · Take the salt shaker off the table. · Flavor your food with garlic, lemon juice, onion, vinegar, herbs, and spices. Do not use soy sauce, lite soy sauce, steak sauce, onion salt, garlic salt, celery salt, mustard, or ketchup on your food. · Use low-sodium salad dressings, sauces, and ketchup. Or make your own salad dressings and sauces without adding salt. · Use less salt (or none) when recipes call for it. You can often use half the salt a recipe calls for without losing flavor. Other foods such as rice, pasta, and grains do not need added salt. · Rinse canned vegetables, and cook them in fresh water. This removes some-but not all-of the salt. · Avoid water that is naturally high in sodium or that has been treated with water softeners, which add sodium.  Call your local water company to find out the sodium content of your water supply. If you buy bottled water, read the label and choose a sodium-free brand. Avoid high-sodium foods  · Avoid eating:  ¨ Smoked, cured, salted, and canned meat, fish, and poultry. ¨ Ham, dyer, hot dogs, and luncheon meats. ¨ Regular, hard, and processed cheese and regular peanut butter. ¨ Crackers with salted tops, and other salted snack foods such as pretzels, chips, and salted popcorn. ¨ Frozen prepared meals, unless labeled low-sodium. ¨ Canned and dried soups, broths, and bouillon, unless labeled sodium-free or low-sodium. ¨ Canned vegetables, unless labeled sodium-free or low-sodium. ¨ Western [place]ina fries, pizza, tacos, and other fast foods. ¨ Pickles, olives, ketchup, and other condiments, especially soy sauce, unless labeled sodium-free or low-sodium. Where can you learn more? Go to http://adela-mily.info/. Enter W812 in the search box to learn more about \"Low Sodium Diet (2,000 Milligram): Care Instructions. \"  Current as of: May 12, 2017  Content Version: 11.4  © 1128-1367 [employer]. Care instructions adapted under license by [employer] (which disclaims liability or warranty for this information). If you have questions about a medical condition or this instruction, always ask your healthcare professional. [place] [address] any warranty or liability for your use of this information.

## 2018-01-12 NOTE — PROGRESS NOTES
HISTORY OF PRESENT ILLNESS  Deb Rodriguez is a 59 y.o. male. HPI Comments: 60 yo male here for f/u of HTN, DM. Did not get letter with last results but discussed that these looked great and reprinted copy for him. Has been off lisinopril since last visit due to nonproductive cough which resolved. Has not been checking at home. Denies CP, SOB. Notes his diet has been a little higher in sodium lately. Singulair has helped with allergy sx. Review of Systems   Constitutional: Negative for chills, fever and weight loss. HENT: Negative for congestion and ear pain. Eyes: Negative for blurred vision and pain. Respiratory: Negative for cough and shortness of breath. Cardiovascular: Negative for chest pain, palpitations and leg swelling. Gastrointestinal: Negative for nausea and vomiting. Genitourinary: Negative for frequency and urgency. Musculoskeletal: Negative for joint pain and myalgias. Skin: Negative for itching and rash. Neurological: Negative for dizziness, tingling and headaches. Psychiatric/Behavioral: Negative for depression. The patient is not nervous/anxious. Past Medical History:   Diagnosis Date    Arthritis     established with Dr. Maricel Morillo chiropractor    Autoimmune disease ([facility] [address]     Blindness     left eye; and periperal vision is absent in R    Diabetes [facility])    Jeanie Beckham Consultant Dr. Juan Perera HIV (human immunodeficiency virus infection) [facility]) dx Adriana Beebe Ms. Ramos; viral loads undetectable for 13 yrs    Hypercholesterolemia     Hypertension      Current Outpatient Prescriptions on File Prior to Visit   Medication Sig Dispense Refill    tiZANidine (ZANAFLEX) 4 mg tablet TAKE 1 TABLET THREE TIMES DAILY AS NEEDED FOR MUSCLE SPASM 90 Tab 0    montelukast (SINGULAIR) 10 mg tablet Take 1 Tab by mouth daily. Indications: Allergic Rhinitis 90 Tab 3    BIOTIN PO Take  by mouth.       magnesium 250 mg tab Take  by mouth.      timolol (TIMOPTIC) 0.5 % ophthalmic solution Administer 1 Drop to both eyes two (2) times a day.  mometasone (NASONEX) 50 mcg/actuation nasal spray 2 Sprays by Both Nostrils route daily. 3 Container 3    brimonidine (ALPHAGAN P) 0.1 % ophthalmic solution every eight (8) hours.  OTHER Take  by mouth daily. Triumebq       rosuvastatin (CRESTOR) 5 mg tablet Take 5 mg by mouth nightly.  triamcinolone acetonide 0.025 % lotion Apply  to affected area two (2) times a day.  didanosine (VIDEX EC) 400 mg capsule Take 400 mg by mouth daily.  omega-3 fatty acids-vitamin e 1,000 mg cap Take 1 Cap by mouth.  multivitamin (ONE A DAY) tablet Take 1 Tab by mouth daily.  ascorbic acid (VITAMIN C) 500 mg tablet Take 500 mg by mouth daily. No current facility-administered medications on file prior to visit. Physical Exam   Constitutional: He appears well-developed and well-nourished. No distress. /80 (BP 1 Location: Right arm, BP Patient Position: Sitting)  Pulse 68  Temp 96 °F (35.6 °C) (Oral)   Resp 16  Ht 6' 2\" (1.88 m)  Wt 197 lb 12.8 oz (89.7 kg)  BMI 25.4 kg/m2     Cardiovascular: Normal rate, regular rhythm and normal heart sounds. Exam reveals no gallop and no friction rub. No murmur heard. Pulmonary/Chest: Effort normal and breath sounds normal. No respiratory distress. He has no wheezes. He has no rales. Neurological: He is alert. He exhibits normal muscle tone. Psychiatric: He has a normal mood and affect.  His behavior is normal.     Lab Results   Component Value Date/Time    Hemoglobin A1c 5.5 12/12/2017 12:00 AM    Hemoglobin A1c (POC) 5.7 08/09/2017 10:05 AM    Hemoglobin A1c, External 6.4 01/17/2017     Lab Results   Component Value Date/Time    Sodium 140 12/12/2017 12:00 AM    Potassium 4.5 12/12/2017 12:00 AM    Chloride 101 12/12/2017 12:00 AM    CO2 28 12/12/2017 12:00 AM    Anion gap 13.0 09/15/2016 09:02 AM    Glucose 93 12/12/2017 12:00 AM    BUN 10 12/12/2017 12:00 AM    Creatinine 1.03 12/12/2017 12:00 AM    BUN/Creatinine ratio 10 12/12/2017 12:00 AM    GFR est AA 91 12/12/2017 12:00 AM    GFR est non-AA 79 12/12/2017 12:00 AM    Calcium 9.3 12/12/2017 12:00 AM    Bilirubin, total 0.6 12/12/2017 12:00 AM    AST (SGOT) 14 12/12/2017 12:00 AM    Alk. phosphatase 83 12/12/2017 12:00 AM    Protein, total 6.6 12/12/2017 12:00 AM    Albumin 4.2 12/12/2017 12:00 AM    Globulin 2.9 09/15/2016 09:02 AM    A-G Ratio 1.8 12/12/2017 12:00 AM    ALT (SGPT) 15 12/12/2017 12:00 AM     ASSESSMENT and PLAN    ICD-10-CM ICD-9-CM    1. Essential hypertension I10 401.9    2. Type 2 diabetes mellitus without complication, without long-term current use of insulin (HCC) E11.9 250.00      Discussed adding low dose of losartan, but he prefers to try DASH/low sodium diet first. Provided info on AVS.   DM diet controlled by last A1c.

## 2018-01-18 RX ORDER — TIZANIDINE 4 MG/1
TABLET ORAL
Qty: 90 TAB | Refills: 0 | Status: SHIPPED | OUTPATIENT
Start: 2018-01-18 | End: 2018-02-09 | Stop reason: SDUPTHER

## 2018-02-12 RX ORDER — TIZANIDINE 4 MG/1
TABLET ORAL
Qty: 90 TAB | Refills: 0 | Status: SHIPPED | OUTPATIENT
Start: 2018-02-12 | End: 2018-04-12 | Stop reason: SDUPTHER

## 2018-03-05 RX ORDER — TIZANIDINE 4 MG/1
TABLET ORAL
Qty: 90 TAB | Refills: 0 | OUTPATIENT
Start: 2018-03-05

## 2018-03-05 NOTE — TELEPHONE ENCOUNTER
Denied:    Requested Prescriptions     Refused Prescriptions Disp Refills    tiZANidine (ZANAFLEX) 4 mg tablet [Pharmacy Med Name: TIZANIDINE HCL 4 MG Tablet] 90 Tab 0     Sig: TAKE 1 TABLET THREE TIMES DAILY AS NEEDED FOR MUSCLE SPASM     Refused By: Ramón Priest     Reason for Refusal: Patient has requested refill too soon     This was filled on 2/12/18 and not due until 3/12/18.

## 2018-04-12 ENCOUNTER — OFFICE VISIT (OUTPATIENT)
Dept: INTERNAL MEDICINE CLINIC | Age: 58
End: 2018-04-12

## 2018-04-12 VITALS
BODY MASS INDEX: 25.59 KG/M2 | RESPIRATION RATE: 18 BRPM | WEIGHT: 199.4 LBS | TEMPERATURE: 97.6 F | SYSTOLIC BLOOD PRESSURE: 137 MMHG | DIASTOLIC BLOOD PRESSURE: 87 MMHG | OXYGEN SATURATION: 100 % | HEIGHT: 74 IN | HEART RATE: 61 BPM

## 2018-04-12 DIAGNOSIS — I10 ESSENTIAL HYPERTENSION: ICD-10-CM

## 2018-04-12 DIAGNOSIS — E78.5 HYPERLIPIDEMIA, UNSPECIFIED HYPERLIPIDEMIA TYPE: ICD-10-CM

## 2018-04-12 DIAGNOSIS — E11.9 TYPE 2 DIABETES MELLITUS WITHOUT COMPLICATION, WITHOUT LONG-TERM CURRENT USE OF INSULIN (HCC): Primary | ICD-10-CM

## 2018-04-12 DIAGNOSIS — M54.5 CHRONIC LOW BACK PAIN, UNSPECIFIED BACK PAIN LATERALITY, WITH SCIATICA PRESENCE UNSPECIFIED: ICD-10-CM

## 2018-04-12 DIAGNOSIS — G89.29 CHRONIC LOW BACK PAIN, UNSPECIFIED BACK PAIN LATERALITY, WITH SCIATICA PRESENCE UNSPECIFIED: ICD-10-CM

## 2018-04-12 RX ORDER — TIZANIDINE 4 MG/1
TABLET ORAL
Qty: 90 TAB | Refills: 0 | Status: SHIPPED | OUTPATIENT
Start: 2018-04-12 | End: 2018-05-04 | Stop reason: SDUPTHER

## 2018-04-12 RX ORDER — MOMETASONE FUROATE 50 UG/1
2 SPRAY, METERED NASAL DAILY
Qty: 3 CONTAINER | Refills: 3 | Status: SHIPPED | OUTPATIENT
Start: 2018-04-12 | End: 2018-09-12

## 2018-04-12 RX ORDER — MONTELUKAST SODIUM 10 MG/1
10 TABLET ORAL DAILY
Qty: 90 TAB | Refills: 3 | Status: SHIPPED | OUTPATIENT
Start: 2018-04-12 | End: 2019-02-12 | Stop reason: SDUPTHER

## 2018-04-12 RX ORDER — ROSUVASTATIN CALCIUM 5 MG/1
5 TABLET, COATED ORAL
Qty: 90 TAB | Refills: 3 | Status: SHIPPED | OUTPATIENT
Start: 2018-04-12 | End: 2019-02-12 | Stop reason: SDUPTHER

## 2018-04-12 NOTE — MR AVS SNAPSHOT
[address] [place] [address] 
 
 
 [address] [facility] [address] [id number] 
[phone] Patient: Jazzy Thomas MRN: [id number] RHF:[id number] Visit Information Date & Time Provider Department Dept. Phone Encounter #  
 4/12/2018  9:30 AM [address] [phone] [id number] Follow-up Instructions Return in about 5 months (around 9/12/2018) for labs, diabetes. Upcoming Health Maintenance Date Due  
 EYE EXAM RETINAL OR DILATED Q1 8/8/1968 Influenza Age 5 to Adult 8/1/2017 MICROALBUMIN Q1 1/17/2018 MEDICARE YEARLY EXAM 5/24/2018 FOOT EXAM Q1 6/7/2018 HEMOGLOBIN A1C Q6M 6/12/2018 LIPID PANEL Q1 12/12/2018 COLONOSCOPY 8/12/2019 DTaP/Tdap/Td series (2 - Td) 10/17/2021 Allergies as of 4/12/2018  Review Complete On: 4/12/2018 By: Sherry Nj LPN No Known Allergies Current Immunizations  Never Reviewed Name Date Influenza Vaccine 9/3/2014, 11/8/2012 Pneumococcal Conjugate (PCV-13) 6/10/2013 Pneumococcal Polysaccharide (PPSV-23) 1/22/2009 Pneumococcal Vaccine (Unspecified Type) 3/19/2014 Tdap 10/17/2011 Not reviewed this visit You Were Diagnosed With   
  
 Codes Comments Essential hypertension     ICD-10-CM: I10 
ICD-9-CM: 401.9 Hyperlipidemia, unspecified hyperlipidemia type     ICD-10-CM: E78.5 ICD-9-CM: 272.4 Chronic low back pain, unspecified back pain laterality, with sciatica presence unspecified     ICD-10-CM: M54.5, G89.29 ICD-9-CM: 724.2, 338.29 Vitals BP Pulse Temp Resp Height(growth percentile) Weight(growth percentile) 137/87 (BP 1 Location: Left arm, BP Patient Position: Sitting) 61 97.6 °F (36.4 °C) (Oral) 18 6' 2\" (1.88 m) 199 lb 6.4 oz (90.4 kg) SpO2 BMI Smoking Status 100% 25.6 kg/m2 Former Smoker Vitals History BMI and BSA Data  Body Mass Index Body Surface Area  
 25.6 kg/m 2 2.17 m 2  
  
  
 Preferred Pharmacy Pharmacy Name Phone [facility] [address] [place] [address] [address] [phone] Your Updated Medication List  
  
   
This list is accurate as of 4/12/18 10:04 AM.  Always use your most recent med list.  
  
  
  
  
 abacavir-dolutegravir-lamiVUDine tablet Commonly known as:  Electa Mola Take 1 Tab by mouth daily. ascorbic acid (vitamin C) 500 mg tablet Commonly known as:  VITAMIN C Take 500 mg by mouth daily. BIOTIN PO Take  by mouth. brimonidine 0.1 % ophthalmic solution Commonly known as:  ALPHAGAN P  
every eight (8) hours. didanosine 400 mg capsule Commonly known as:  VIDEX EC Take 400 mg by mouth daily. magnesium 250 mg Tab Take  by mouth.  
  
 mometasone 50 mcg/actuation nasal spray Commonly known as:  NASONEX  
2 Sprays by Both Nostrils route daily. montelukast 10 mg tablet Commonly known as:  SINGULAIR Take 1 Tab by mouth daily. Indications: Allergic Rhinitis  
  
 multivitamin tablet Commonly known as:  ONE A DAY Take 1 Tab by mouth daily. omega-3 fatty acids-vitamin e 1,000 mg Cap Take 1 Cap by mouth. OTHER Take  by mouth daily. Triumebq  
  
 rosuvastatin 5 mg tablet Commonly known as:  CRESTOR Take 1 Tab by mouth nightly. tenofovir ALAFENAMIDE FUMARATE 25 mg Tab Commonly known as:  VEMLIDY Take 25 mg by mouth daily. timolol 0.5 % ophthalmic solution Commonly known as:  TIMOPTIC Administer 1 Drop to both eyes two (2) times a day. tiZANidine 4 mg tablet Commonly known as:  Nba Rebel TAKE 1 TABLET THREE TIMES DAILY AS NEEDED FOR MUSCLE SPASM  Indications: Muscle Spasm  
  
 triamcinolone acetonide 0.025 % lotion Apply  to affected area two (2) times a day. Prescriptions Sent to Pharmacy Refills  
 montelukast (SINGULAIR) 10 mg tablet 3 Sig: Take 1 Tab by mouth daily. Indications: Allergic Rhinitis  Class: Normal  
 Pharmacy: [address] [place] [address], [address] [place] [address] Ph #: [phone] Route: Oral  
 rosuvastatin (CRESTOR) 5 mg tablet 3 Sig: Take 1 Tab by mouth nightly. Class: Normal  
 Pharmacy: [address] [place] [address] Ph #: [phone] Route: Oral  
 mometasone (NASONEX) 50 mcg/actuation nasal spray 3 Si Sprays by Both Nostrils route daily. Class: Normal  
 Pharmacy: [address] [place] [address] Ph #: [phone] Route: Both Nostrils  
 tiZANidine (ZANAFLEX) 4 mg tablet 0 Sig: TAKE 1 TABLET THREE TIMES DAILY AS NEEDED FOR MUSCLE SPASM  Indications: Muscle Spasm Class: Normal  
 Pharmacy: [address] [place] [address] Ph #: [phone] Follow-up Instructions Return in about 5 months (around 2018) for labs, diabetes. Introducing [facility]! Jose Delcid introduces IncreaseCard patient portal. Now you can access parts of your medical record, email your doctor's office, and request medication refills online. 1. In your internet browser, go to [email or web address] 2. Click on the First Time User? Click Here link in the Sign In box. You will see the New Member Sign Up page. 3. Enter your IncreaseCard Access Code exactly as it appears below. You will not need to use this code after youve completed the sign-up process. If you do not sign up before the expiration date, you must request a new code. · IncreaseCard Access Code: [email or web address] Expires: 2018 10:18 AM 
 
4. Enter the last four digits of your Social Security Number (xxxx) and Date of Birth (mm/dd/yyyy) as indicated and click Submit. You will be taken to the next sign-up page. 5. Create a IncreaseCard ID. This will be your IncreaseCard login ID and cannot be changed, so think of one that is secure and easy to remember. 6. Create a HEMINGWAY password. You can change your password at any time. 7. Enter your Password Reset Question and Answer. This can be used at a later time if you forget your password. 8. Enter your e-mail address. You will receive e-mail notification when new information is available in [address] 9. Click Sign Up. You can now view and download portions of your medical record. 10. Click the Download Summary menu link to download a portable copy of your medical information. If you have questions, please visit the Frequently Asked Questions section of the [employer]EM[employer] website. Remember, HEMINGWAY is NOT to be used for urgent needs. For medical emergencies, dial 911. Now available from your iPhone and Android! Please provide this summary of care documentation to your next provider. Your primary care clinician is listed as Miller Ness Avmeet. If you have any questions after today's visit, please call [phone].

## 2018-04-12 NOTE — PROGRESS NOTES
HISTORY OF PRESENT ILLNESS  Garland De Luna is a 59 y.o. male. HPI Comments: 60 yo male here for f/u of DM, HTN, HLD, back pain. Labs controlled in [address]. Continues with healthy diet, exercise. Has f/u scheduled with ID next week. BP stable. Needs statin refilled. Back pain improves with zanaflex. Typically takes once daily. Needs refill. Hypertension    Pertinent negatives include no chest pain, no palpitations, no blurred vision, no headaches, no dizziness, no shortness of breath, no nausea and no vomiting. Diabetes   Pertinent negatives include no chest pain, no headaches and no shortness of breath. Cholesterol Problem   Pertinent negatives include no chest pain, no headaches and no shortness of breath. Medication Refill   Pertinent negatives include no chest pain, no headaches and no shortness of breath. Review of Systems   Constitutional: Negative for chills, fever and weight loss. HENT: Negative for congestion and ear pain. Eyes: Negative for blurred vision and pain. Respiratory: Negative for cough and shortness of breath. Cardiovascular: Negative for chest pain, palpitations and leg swelling. Gastrointestinal: Negative for nausea and vomiting. Genitourinary: Negative for frequency and urgency. Musculoskeletal: Negative for joint pain and myalgias. Skin: Negative for itching and rash. Neurological: Negative for dizziness, tingling and headaches. Psychiatric/Behavioral: Negative for depression. The patient is not nervous/anxious. Past Medical History:   Diagnosis Date    Arthritis     established with Dr. Elise Toussaint chiropractor    Autoimmune disease ([facility] [address]     Blindness     left eye; and periperal vision is absent in R    Diabetes [facility])    Kenisha Paul Consultant Dr. Inna Cole HIV (human immunodeficiency virus infection) [facility]) dx Ulices Saldana Ms.  Richard; viral loads undetectable for 13 yrs    Hypercholesterolemia     Hypertension      Current Outpatient Prescriptions on File Prior to Visit   Medication Sig Dispense Refill    tiZANidine (ZANAFLEX) 4 mg tablet TAKE 1 TABLET THREE TIMES DAILY AS NEEDED FOR MUSCLE SPASM 90 Tab 0    tenofovir ALAFENAMIDE FUMARATE (VEMLIDY) 25 mg tab Take 25 mg by mouth daily. 90 Tab 3    abacavir-dolutegravir-lamiVUDine (TRIUMEQ) tablet Take 1 Tab by mouth daily. 90 Tab 3    montelukast (SINGULAIR) 10 mg tablet Take 1 Tab by mouth daily. Indications: Allergic Rhinitis 90 Tab 3    magnesium 250 mg tab Take  by mouth.  timolol (TIMOPTIC) 0.5 % ophthalmic solution Administer 1 Drop to both eyes two (2) times a day.  mometasone (NASONEX) 50 mcg/actuation nasal spray 2 Sprays by Both Nostrils route daily. 3 Container 3    brimonidine (ALPHAGAN P) 0.1 % ophthalmic solution every eight (8) hours.  rosuvastatin (CRESTOR) 5 mg tablet Take 5 mg by mouth nightly.  triamcinolone acetonide 0.025 % lotion Apply  to affected area two (2) times a day.  didanosine (VIDEX EC) 400 mg capsule Take 400 mg by mouth daily.  omega-3 fatty acids-vitamin e 1,000 mg cap Take 1 Cap by mouth.  multivitamin (ONE A DAY) tablet Take 1 Tab by mouth daily.  ascorbic acid (VITAMIN C) 500 mg tablet Take 500 mg by mouth daily.  BIOTIN PO Take  by mouth.  OTHER Take  by mouth daily. Triumebq        No current facility-administered medications on file prior to visit. Physical Exam   Constitutional: He appears well-developed and well-nourished. No distress. /87 (BP 1 Location: Left arm, BP Patient Position: Sitting)  Pulse 61  Temp 97.6 °F (36.4 °C) (Oral)   Resp 18  Ht 6' 2\" (1.88 m)  Wt 199 lb 6.4 oz (90.4 kg)  SpO2 100%  BMI 25.6 kg/m2     Eyes: EOM are normal. Right eye exhibits no discharge. Left eye exhibits no discharge. No scleral icterus. Cardiovascular: Normal rate, regular rhythm and normal heart sounds. Exam reveals no gallop and no friction rub. No murmur heard. Pulmonary/Chest: Effort normal and breath sounds normal. No respiratory distress. He has no wheezes. He has no rales. Musculoskeletal: He exhibits no edema or tenderness. Neurological: He is alert. He exhibits normal muscle tone. Skin: Skin is warm and dry. Psychiatric: He has a normal mood and affect. Lab Results   Component Value Date/Time    Sodium 140 12/12/2017 12:00 AM    Potassium 4.5 12/12/2017 12:00 AM    Chloride 101 12/12/2017 12:00 AM    CO2 28 12/12/2017 12:00 AM    Anion gap 13.0 09/15/2016 09:02 AM    Glucose 93 12/12/2017 12:00 AM    BUN 10 12/12/2017 12:00 AM    Creatinine 1.03 12/12/2017 12:00 AM    BUN/Creatinine ratio 10 12/12/2017 12:00 AM    GFR est AA 91 12/12/2017 12:00 AM    GFR est non-AA 79 12/12/2017 12:00 AM    Calcium 9.3 12/12/2017 12:00 AM    Bilirubin, total 0.6 12/12/2017 12:00 AM    AST (SGOT) 14 12/12/2017 12:00 AM    Alk. phosphatase 83 12/12/2017 12:00 AM    Protein, total 6.6 12/12/2017 12:00 AM    Albumin 4.2 12/12/2017 12:00 AM    Globulin 2.9 09/15/2016 09:02 AM    A-G Ratio 1.8 12/12/2017 12:00 AM    ALT (SGPT) 15 12/12/2017 12:00 AM     Lab Results   Component Value Date/Time    Hemoglobin A1c 5.5 12/12/2017 12:00 AM    Hemoglobin A1c (POC) 5.7 08/09/2017 10:05 AM    Hemoglobin A1c, External 6.4 01/17/2017     Lab Results   Component Value Date/Time    Prostate Specific Ag 0.605 04/14/2016 09:18 AM     Lab Results   Component Value Date/Time    WBC 5.0 09/15/2016 09:02 AM    HGB 14.5 09/15/2016 09:02 AM    HCT 44.7 09/15/2016 09:02 AM    PLATELET 066 08/88/6494 09:02 AM    MCV 95 09/15/2016 09:02 AM     Lab Results   Component Value Date/Time    Cholesterol, total 131 12/12/2017 12:00 AM    HDL Cholesterol 63 12/12/2017 12:00 AM    LDL, calculated 61 12/12/2017 12:00 AM    VLDL, calculated 7 12/12/2017 12:00 AM    Triglyceride 35 12/12/2017 12:00 AM     ASSESSMENT and PLAN    ICD-10-CM ICD-9-CM    1.  Type 2 diabetes mellitus without complication, without long-term current use of insulin (HCC) E11.9 250.00    2. Essential hypertension I10 401.9 mometasone (NASONEX) 50 mcg/actuation nasal spray   3. Hyperlipidemia, unspecified hyperlipidemia type E78.5 272.4 rosuvastatin (CRESTOR) 5 mg tablet   4. Chronic low back pain, unspecified back pain laterality, with sciatica presence unspecified M54.5 724.2     G89.29 338.29      Last labs stable. Will continue with current medication and repeat labs next visit. Medication refilled.

## 2018-04-12 NOTE — PROGRESS NOTES
ROOM # 111 [facility] presents today for   Chief Complaint   Patient presents with    Hypertension     3 month f/u    Diabetes     3 month f/u    Cholesterol Problem     3 month f/u    Medication Refill     Requested Prescriptions     Pending Prescriptions Disp Refills    montelukast (SINGULAIR) 10 mg tablet 90 Tab 3     Sig: Take 1 Tab by mouth daily. Indications: Allergic Rhinitis    rosuvastatin (CRESTOR) 5 mg tablet       Sig: Take 1 Tab by mouth nightly.  mometasone (NASONEX) 50 mcg/actuation nasal spray 3 Container 3     Si Sprays by Both Nostrils route daily.  tiZANidine (ZANAFLEX) 4 mg tablet 90 Tab 0         Mae Gilbert preferred language for health care discussion is english/other. Is someone accompanying this pt? no    Is the patient using any DME equipment during OV? no    Depression Screening:  PHQ over the last two weeks 2018   Little interest or pleasure in doing things Not at all Not at all Not at all Not at all   Feeling down, depressed or hopeless Not at all Not at all Not at all Not at all   Total Score PHQ 2 0 0 0 0       Learning Assessment:  Learning Assessment 2017   PRIMARY LEARNER Patient Patient Patient   HIGHEST LEVEL OF EDUCATION - PRIMARY LEARNER  4 YEARS OF COLLEGE 4 [address] [facility] CAREGIVER - No -   [address] - -     VIDEOS - -   ANSWERED BY patient patient self   RELATIONSHIP SELF SELF SELF       Abuse Screening:  Abuse Screening Questionnaire 2016   Do you ever feel afraid of your partner? N   Are you in a relationship with someone who physically or mentally threatens you? N   Is it safe for you to go home? Y       Fall Risk  Fall Risk Assessment, last 12 mths 2017   Able to walk? Yes   Fall in past 12 months? No       Health Maintenance reviewed and discussed per provider. Yes    Dustin Alcantara is due for    Health Maintenance Due   Topic Date Due    EYE EXAM RETINAL OR DILATED Q1  08/08/1970    Influenza Age 5 to Adult  08/01/2017    MICROALBUMIN Q1  01/17/2018   Pt. Declined influenza vax   Please order/place referral if appropriate. Advance Directive:  1. Do you have an advance directive in place? Patient Reply: no    2. If not, would you like material regarding how to put one in place? Patient Reply: no    Coordination of Care:  1. Have you been to the ER, urgent care clinic since your last visit? Hospitalized since your last visit? no    2. Have you seen or consulted any other health care providers outside of the [address] [place] [address] since your last visit? Include any pap smears or colon screening.  no

## 2018-05-07 RX ORDER — TIZANIDINE 4 MG/1
TABLET ORAL
Qty: 90 TAB | Refills: 0 | Status: SHIPPED | OUTPATIENT
Start: 2018-05-07 | End: 2018-05-31 | Stop reason: SDUPTHER

## 2018-05-31 RX ORDER — TIZANIDINE 4 MG/1
TABLET ORAL
Qty: 90 TAB | Refills: 0 | Status: SHIPPED | OUTPATIENT
Start: 2018-05-31 | End: 2018-06-27 | Stop reason: SDUPTHER

## 2018-06-27 RX ORDER — TIZANIDINE 4 MG/1
TABLET ORAL
Qty: 90 TAB | Refills: 0 | Status: SHIPPED | OUTPATIENT
Start: 2018-06-27 | End: 2018-07-24 | Stop reason: SDUPTHER

## 2018-07-24 RX ORDER — TIZANIDINE 4 MG/1
TABLET ORAL
Qty: 90 TAB | Refills: 0 | Status: SHIPPED | OUTPATIENT
Start: 2018-07-24 | End: 2019-01-14 | Stop reason: SDUPTHER

## 2018-09-12 ENCOUNTER — OFFICE VISIT (OUTPATIENT)
Dept: INTERNAL MEDICINE CLINIC | Age: 58
End: 2018-09-12

## 2018-09-12 VITALS
SYSTOLIC BLOOD PRESSURE: 146 MMHG | HEART RATE: 67 BPM | OXYGEN SATURATION: 100 % | WEIGHT: 202 LBS | BODY MASS INDEX: 25.93 KG/M2 | RESPIRATION RATE: 22 BRPM | TEMPERATURE: 95.7 F | HEIGHT: 74 IN | DIASTOLIC BLOOD PRESSURE: 90 MMHG

## 2018-09-12 DIAGNOSIS — I10 ESSENTIAL HYPERTENSION: ICD-10-CM

## 2018-09-12 DIAGNOSIS — E11.9 TYPE 2 DIABETES MELLITUS WITHOUT COMPLICATION, WITHOUT LONG-TERM CURRENT USE OF INSULIN (HCC): ICD-10-CM

## 2018-09-12 DIAGNOSIS — J31.0 CHRONIC RHINITIS: ICD-10-CM

## 2018-09-12 LAB — HBA1C MFR BLD HPLC: 5.3 %

## 2018-09-12 RX ORDER — MOMETASONE FUROATE 50 UG/1
2 SPRAY, METERED NASAL DAILY
Qty: 3 CONTAINER | Refills: 0 | Status: SHIPPED | OUTPATIENT
Start: 2018-09-12 | End: 2020-01-10 | Stop reason: SDUPTHER

## 2018-09-12 NOTE — PATIENT INSTRUCTIONS
Learning About High Blood Pressure What is high blood pressure? Blood pressure is a measure of how hard the blood pushes against the walls of your arteries. It's normal for blood pressure to go up and down throughout the day, but if it stays up, you have high blood pressure. Another name for high blood pressure is hypertension. Two numbers tell you your blood pressure. The first number is the systolic pressure. It shows how hard the blood pushes when your heart is pumping. The second number is the diastolic pressure. It shows how hard the blood pushes between heartbeats, when your heart is relaxed and filling with blood. A blood pressure of less than 120/80 (say \"120 over 80\") is ideal for an adult. High blood pressure is 130/80 or higher. You have high blood pressure if your top number is 130 or higher or your bottom number is 80 or higher, or both. What happens when you have high blood pressure? · Blood flows through your arteries with too much force. Over time, this damages the walls of your arteries. But you can't feel it. High blood pressure usually doesn't cause symptoms. · Fat and calcium start to build up in your arteries. This buildup is called plaque. Plaque makes your arteries narrower and stiffer. Blood can't flow through them as easily. · This lack of good blood flow starts to damage some of the organs in your body. This can lead to problems such as coronary artery disease and heart attack, heart failure, stroke, kidney failure, and eye damage. How can you prevent high blood pressure? · Stay at a healthy weight. · Try to limit how much sodium you eat to less than 2,300 milligrams (mg) a day. If you limit your sodium to 1,500 mg a day, you can lower your blood pressure even more. ¨ Buy foods that are labeled \"unsalted,\" \"sodium-free,\" or \"low-sodium. \" Foods labeled \"reduced-sodium\" and \"light sodium\" may still have too much sodium. ¨ Flavor your food with garlic, lemon juice, onion, vinegar, herbs, and spices instead of salt. Do not use soy sauce, steak sauce, onion salt, garlic salt, mustard, or ketchup on your food. ¨ Use less salt (or none) when recipes call for it. You can often use half the salt a recipe calls for without losing flavor. · Be physically active. Get at least 30 minutes of exercise on most days of the week. Walking is a good choice. You also may want to do other activities, such as running, swimming, cycling, or playing tennis or team sports. · Limit alcohol to 2 drinks a day for men and 1 drink a day for women. · Eat plenty of fruits, vegetables, and low-fat dairy products. Eat less saturated and total fats. How is high blood pressure treated? · Your doctor will suggest making lifestyle changes. For example, your doctor may ask you to eat healthy foods, quit smoking, lose extra weight, and be more active. · If lifestyle changes don't help enough or your blood pressure is very high, you will have to take medicine every day. Follow-up care is a key part of your treatment and safety. Be sure to make and go to all appointments, and call your doctor if you are having problems. It's also a good idea to know your test results and keep a list of the medicines you take. Where can you learn more? Go to http://adela-mily.info/. Enter P501 in the search box to learn more about \"Learning About High Blood Pressure. \" Current as of: May 10, 2017 Content Version: 11.7 © 4884-4314 [employer]. Care instructions adapted under license by [employer] (which disclaims liability or warranty for this information). If you have questions about a medical condition or this instruction, always ask your healthcare professional. [address] any warranty or liability for your use of this information.

## 2018-09-12 NOTE — PROGRESS NOTES
HISTORY OF PRESENT ILLNESS Adia Matthews is a 59 y.o. male. HPI Comments: 60 yo male here for f/u of DM, HTN, rhinitis. DM diet comtrolled. Has been excellent on A1c in past 
HTN:off medication. Had cough with lisinopril. Elevated today. No CP, SOB Chronic rhinitis. Notes he does well if he takes brand Nasonex, but voice changes when using generic. Diabetes Pertinent negatives include no chest pain, no headaches and no shortness of breath. Review of Systems Constitutional: Negative for chills, fever and weight loss. HENT: Negative for congestion, ear pain and sinus pain. Eyes: Negative for blurred vision and pain. Respiratory: Negative for cough and shortness of breath. Cardiovascular: Negative for chest pain, palpitations and leg swelling. Gastrointestinal: Negative for nausea and vomiting. Genitourinary: Negative for frequency and urgency. Musculoskeletal: Negative for joint pain and myalgias. Skin: Negative for itching and rash. Neurological: Negative for dizziness, tingling and headaches. Psychiatric/Behavioral: Negative for depression. The patient is not nervous/anxious. Past Medical History:  
Diagnosis Date  Arthritis   
 established with Dr. Elizabeth Kelley chiropractor  Autoimmune disease ([facility] [address]  Blindness   
 left eye; and periperal vision is absent in R  
 Diabetes ([facility] [address]  Glaucoma [place] Double Consultant Dr. Kirsten Mei  HIV (human immunodeficiency virus infection) ([facility] [address] dx 1997 Talon Harrington Ms. Ramos; viral loads undetectable for 13 yrs  Hypercholesterolemia  Hypertension Current Outpatient Prescriptions on File Prior to Visit Medication Sig Dispense Refill  tiZANidine (ZANAFLEX) 4 mg tablet TAKE 1 TABLET THREE TIMES DAILY AS NEEDED FOR MUSCLE SPASM   90 Tab 0  
 montelukast (SINGULAIR) 10 mg tablet Take 1 Tab by mouth daily. Indications:  Allergic Rhinitis 90 Tab 3  
  rosuvastatin (CRESTOR) 5 mg tablet Take 1 Tab by mouth nightly. 90 Tab 3  
 tenofovir ALAFENAMIDE FUMARATE (VEMLIDY) 25 mg tab Take 25 mg by mouth daily. 90 Tab 3  
 abacavir-dolutegravir-lamiVUDine (TRIUMEQ) tablet Take 1 Tab by mouth daily. 90 Tab 3  
 brimonidine (ALPHAGAN P) 0.1 % ophthalmic solution every eight (8) hours.  OTHER Take  by mouth daily. Triumebq  triamcinolone acetonide 0.025 % lotion Apply  to affected area two (2) times a day.  multivitamin (ONE A DAY) tablet Take 1 Tab by mouth daily.  ascorbic acid (VITAMIN C) 500 mg tablet Take 500 mg by mouth daily.  BIOTIN PO Take  by mouth.  magnesium 250 mg tab Take  by mouth.  timolol (TIMOPTIC) 0.5 % ophthalmic solution Administer 1 Drop to both eyes two (2) times a day.  didanosine (VIDEX EC) 400 mg capsule Take 400 mg by mouth daily.  omega-3 fatty acids-vitamin e 1,000 mg cap Take 1 Cap by mouth. No current facility-administered medications on file prior to visit. Physical Exam  
Constitutional: He appears well-developed and well-nourished. No distress. /90 (BP 1 Location: Right arm, BP Patient Position: Sitting)  Pulse 67  Temp 95.7 °F (35.4 °C) (Oral)   Resp 22  Ht 6' 2\" (1.88 m)  Wt 202 lb (91.6 kg)  SpO2 100%  BMI 25.94 kg/m2 Eyes: EOM are normal. Right eye exhibits no discharge. Left eye exhibits no discharge. No scleral icterus. Neck: Neck supple. Cardiovascular: Normal rate, regular rhythm and normal heart sounds. Exam reveals no gallop and no friction rub. No murmur heard. Pulmonary/Chest: Effort normal and breath sounds normal. No respiratory distress. He has no wheezes. He has no rales. Musculoskeletal: He exhibits no edema or tenderness. Lymphadenopathy:  
  He has no cervical adenopathy. Neurological: He is alert. He exhibits normal muscle tone. Skin: Skin is warm and dry. Psychiatric: He has a normal mood and affect. Lab Results Component Value Date/Time Hemoglobin A1c 5.5 12/12/2017 12:00 AM  
 Hemoglobin A1c (POC) 5.7 08/09/2017 10:05 AM  
 Hemoglobin A1c, External 6.4 01/17/2017 Lab Results Component Value Date/Time Sodium 140 12/12/2017 12:00 AM  
 Potassium 4.5 12/12/2017 12:00 AM  
 Chloride 101 12/12/2017 12:00 AM  
 CO2 28 12/12/2017 12:00 AM  
 Anion gap 13.0 09/15/2016 09:02 AM  
 Glucose 93 12/12/2017 12:00 AM  
 BUN 10 12/12/2017 12:00 AM  
 Creatinine 1.03 12/12/2017 12:00 AM  
 BUN/Creatinine ratio 10 12/12/2017 12:00 AM  
 GFR est AA 91 12/12/2017 12:00 AM  
 GFR est non-AA 79 12/12/2017 12:00 AM  
 Calcium 9.3 12/12/2017 12:00 AM  
 Bilirubin, total 0.6 12/12/2017 12:00 AM  
 AST (SGOT) 14 12/12/2017 12:00 AM  
 Alk. phosphatase 83 12/12/2017 12:00 AM  
 Protein, total 6.6 12/12/2017 12:00 AM  
 Albumin 4.2 12/12/2017 12:00 AM  
 Globulin 2.9 09/15/2016 09:02 AM  
 A-G Ratio 1.8 12/12/2017 12:00 AM  
 ALT (SGPT) 15 12/12/2017 12:00 AM  
 
ASSESSMENT and PLAN 
  ICD-10-CM ICD-9-CM 1. Type 2 diabetes mellitus without complication, without long-term current use of insulin (HCC) E11.9 250.00 AMB POC HEMOGLOBIN A1C  
2. Essential hypertension I10 401.9 3. Chronic rhinitis J31.0 472.0 Repeat A1c today in IFG range. Continue with dietary changes. BP elevated, improved with manual check. Does not wish to resume antihypertensive at this time. Will monitor. Nasonex ordered as brand only given issues with generic.

## 2018-09-12 NOTE — PROGRESS NOTES
ROOM # 17 Identified pt with two pt identifiers(name and ). Reviewed record in preparation for visit and have obtained necessary documentation. Chief Complaint Patient presents with  Diabetes Robina Troncoso preferred language for health care discussion is english/other. Is the patient using any DME equipment during OV? NO Robina Troncoso is due for: 
Health Maintenance Due Topic  
 EYE EXAM RETINAL OR DILATED Q1   
 MICROALBUMIN Q1   
 MEDICARE YEARLY EXAM   
 FOOT EXAM Q1   
 ZOSTER VACCINE AGE 60>   
 HEMOGLOBIN A1C Q6M   
 Influenza Age 5 to Adult Health Maintenance reviewed and discussed per provider Please order/place referral if appropriate. Advance Directive: 1. Do you have an advance directive in place? Patient Reply: NO 
 
2. If not, would you like material regarding how to put one in place? YES Coordination of Care: 1. Have you been to the ER, urgent care clinic since your last visit? Hospitalized since your last visit? NO 
 
2. Have you seen or consulted any other health care providers outside of the [address] [place] [address] since your last visit? Include any pap smears or colon screening. YES Patient is accompanied by self I have received verbal consent from Robina Troncoso to discuss any/all medical information while they are present in the room. Learning Assessment: 
Learning Assessment 2017 PRIMARY LEARNER Patient Patient Patient HIGHEST LEVEL OF EDUCATION - PRIMARY LEARNER  4 YEARS OF COLLEGE 4 YEARS OF COLLEGE -  
BARRIERS PRIMARY LEARNER NONE NONE NONE  
CO-LEARNER CAREGIVER - No - PRIMARY LANGUAGE ENGLISH ENGLISH ENGLISH  
LEARNER PREFERENCE PRIMARY DEMONSTRATION DEMONSTRATION DEMONSTRATION  
  LISTENING - -  
  VIDEOS - -  
ANSWERED BY patient patient self RELATIONSHIP SELF SELF SELF Depression Screening: PHQ over the last two weeks 2018 Little interest or pleasure in doing things Not at all Not at all Not at all Not at all Not at all Feeling down, depressed, irritable, or hopeless Not at all Not at all Not at all Not at all Not at all Total Score PHQ 2 0 0 0 0 0 Abuse Screening: 
Abuse Screening Questionnaire 9/22/2016 Do you ever feel afraid of your partner? Anna Shea Are you in a relationship with someone who physically or mentally threatens you? Anna Shea Is it safe for you to go home? Rosette Jasso Fall Risk Fall Risk Assessment, last 12 mths 5/23/2017 Able to walk? Yes Fall in past 12 months?  No

## 2018-09-12 NOTE — MR AVS SNAPSHOT
[address] [facility] 
 
 
 [address] [facility] [address] [address] 
[phone] Patient: Juana Cheney MRN: [id number] WSO:3/7/2037 Visit Information Date & Time Provider Department Dept. Phone Encounter #  
 9/12/2018  9:30 AM [facility] [phone] [id number] Follow-up Instructions Return in about 6 months (around 3/12/2019), or if symptoms worsen or fail to improve, for hypertension. Upcoming Health Maintenance Date Due  
 EYE EXAM RETINAL OR DILATED Q1 8/8/1968 MICROALBUMIN Q1 1/17/2018 MEDICARE YEARLY EXAM 5/24/2018 FOOT EXAM Q1 6/7/2018 ZOSTER VACCINE AGE 60> 6/8/2018 HEMOGLOBIN A1C Q6M 6/12/2018 Influenza Age 5 to Adult 8/1/2018 LIPID PANEL Q1 12/12/2018 COLONOSCOPY 8/12/2019 DTaP/Tdap/Td series (2 - Td) 10/17/2021 Allergies as of 9/12/2018  Review Complete On: 9/12/2018 By: Scooter Cody No Known Allergies Current Immunizations  Never Reviewed Name Date Influenza Vaccine 9/3/2014, 11/8/2012 Pneumococcal Conjugate (PCV-13) 6/10/2013 Pneumococcal Polysaccharide (PPSV-23) 1/22/2009 Pneumococcal Vaccine (Unspecified Type) 3/19/2014 Tdap 10/17/2011 Not reviewed this visit You Were Diagnosed With   
  
 Codes Comments Type 2 diabetes mellitus without complication, without long-term current use of insulin (HCC)     ICD-10-CM: E11.9 ICD-9-CM: 250.00 Essential hypertension     ICD-10-CM: I10 
ICD-9-CM: 401.9 Chronic rhinitis     ICD-10-CM: J31.0 ICD-9-CM: 472.0 Vitals BP Pulse Temp Resp Height(growth percentile) Weight(growth percentile) 146/90 (BP 1 Location: Right arm, BP Patient Position: Sitting) 67 95.7 °F (35.4 °C) (Oral) 22 6' 2\" (1.88 m) 202 lb (91.6 kg) SpO2 BMI Smoking Status 100% 25.94 kg/m2 Former Smoker Vitals History BMI and BSA Data Body Mass Index Body Surface Area 25.94 kg/m 2 2.19 m 2 Preferred Pharmacy Pharmacy Name Phone Vimal Marrero [facility] - [phone] [facility] [address] [phone] Your Updated Medication List  
  
   
This list is accurate as of 18  9:51 AM.  Always use your most recent med list.  
  
  
  
  
 abacavir-dolutegravir-lamiVUDine tablet Commonly known as:  Deb Poncho Take 1 Tab by mouth daily. ascorbic acid (vitamin C) 500 mg tablet Commonly known as:  VITAMIN C Take 500 mg by mouth daily. BIOTIN PO Take  by mouth. brimonidine 0.1 % ophthalmic solution Commonly known as:  ALPHAGAN P  
every eight (8) hours. didanosine 400 mg capsule Commonly known as:  VIDEX EC Take 400 mg by mouth daily. magnesium 250 mg Tab Take  by mouth.  
  
 mometasone 50 mcg/actuation nasal spray Commonly known as:  NASONEX  
2 Sprays by Both Nostrils route daily. montelukast 10 mg tablet Commonly known as:  SINGULAIR Take 1 Tab by mouth daily. Indications: Allergic Rhinitis  
  
 multivitamin tablet Commonly known as:  ONE A DAY Take 1 Tab by mouth daily. omega-3 fatty acids-vitamin e 1,000 mg Cap Take 1 Cap by mouth. OTHER Take  by mouth daily. Triumebq  
  
 rosuvastatin 5 mg tablet Commonly known as:  CRESTOR Take 1 Tab by mouth nightly. tenofovir ALAFENAMIDE FUMARATE 25 mg Tab Commonly known as:  VEMLIDY Take 25 mg by mouth daily. timolol 0.5 % ophthalmic solution Commonly known as:  TIMOPTIC Administer 1 Drop to both eyes two (2) times a day. tiZANidine 4 mg tablet Commonly known as:  [place] TAKE 1 TABLET THREE TIMES DAILY AS NEEDED FOR MUSCLE SPASM  
  
 triamcinolone acetonide 0.025 % lotion Apply  to affected area two (2) times a day. Prescriptions Sent to Pharmacy Refills  
 mometasone (NASONEX) 50 mcg/actuation nasal spray 0 Si Sprays by Both Nostrils route daily.   
 Class: Normal  
 Pharmacy: [address], [address] [place] [address] #: [phone] Route: Both Nostrils We Performed the Following AMB POC HEMOGLOBIN A1C [64779 CPT(R)] Follow-up Instructions Return in about 6 months (around 3/12/2019), or if symptoms worsen or fail to improve, for hypertension. Patient Instructions Learning About High Blood Pressure What is high blood pressure? Blood pressure is a measure of how hard the blood pushes against the walls of your arteries. It's normal for blood pressure to go up and down throughout the day, but if it stays up, you have high blood pressure. Another name for high blood pressure is hypertension. Two numbers tell you your blood pressure. The first number is the systolic pressure. It shows how hard the blood pushes when your heart is pumping. The second number is the diastolic pressure. It shows how hard the blood pushes between heartbeats, when your heart is relaxed and filling with blood. A blood pressure of less than 120/80 (say \"120 over 80\") is ideal for an adult. High blood pressure is 130/80 or higher. You have high blood pressure if your top number is 130 or higher or your bottom number is 80 or higher, or both. What happens when you have high blood pressure? · Blood flows through your arteries with too much force. Over time, this damages the walls of your arteries. But you can't feel it. High blood pressure usually doesn't cause symptoms. · Fat and calcium start to build up in your arteries. This buildup is called plaque. Plaque makes your arteries narrower and stiffer. Blood can't flow through them as easily. · This lack of good blood flow starts to damage some of the organs in your body. This can lead to problems such as coronary artery disease and heart attack, heart failure, stroke, kidney failure, and eye damage. How can you prevent high blood pressure? · Stay at a healthy weight. · Try to limit how much sodium you eat to less than 2,300 milligrams (mg) a day. If you limit your sodium to 1,500 mg a day, you can lower your blood pressure even more. ¨ Buy foods that are labeled \"unsalted,\" \"sodium-free,\" or \"low-sodium. \" Foods labeled \"reduced-sodium\" and \"light sodium\" may still have too much sodium. ¨ Flavor your food with garlic, lemon juice, onion, vinegar, herbs, and spices instead of salt. Do not use soy sauce, steak sauce, onion salt, garlic salt, mustard, or ketchup on your food. ¨ Use less salt (or none) when recipes call for it. You can often use half the salt a recipe calls for without losing flavor. · Be physically active. Get at least 30 minutes of exercise on most days of the week. Walking is a good choice. You also may want to do other activities, such as running, swimming, cycling, or playing tennis or team sports. · Limit alcohol to 2 drinks a day for men and 1 drink a day for women. · Eat plenty of fruits, vegetables, and low-fat dairy products. Eat less saturated and total fats. How is high blood pressure treated? · Your doctor will suggest making lifestyle changes. For example, your doctor may ask you to eat healthy foods, quit smoking, lose extra weight, and be more active. · If lifestyle changes don't help enough or your blood pressure is very high, you will have to take medicine every day. Follow-up care is a key part of your treatment and safety. Be sure to make and go to all appointments, and call your doctor if you are having problems. It's also a good idea to know your test results and keep a list of the medicines you take. Where can you learn more? Go to http://adela-mily.info/. Enter P501 in the search box to learn more about \"Learning About High Blood Pressure. \" Current as of: May 10, 2017 Content Version: 11.7 © 7727-8072 [employer], [employer].  Care instructions adapted under license by [address] (which disclaims liability or warranty for this information). If you have questions about a medical condition or this instruction, always ask your healthcare professional. [address] any warranty or liability for your use of this information. Introducing [facility]! Ramonita Hank introduces [employer] patient portal. Now you can access parts of your medical record, email your doctor's office, and request medication refills online. 1. In your internet browser, go to [email or web address] 2. Click on the First Time User? Click Here link in the Sign In box. You will see the New Member Sign Up page. 3. Enter your Hom[employer] Access Code exactly as it appears below. You will not need to use this code after youve completed the sign-up process. If you do not sign up before the expiration date, you must request a new code. · [employer] Access Code: [email or web address] Expires: 12/11/2018  9:51 AM 
 
4. Enter the last four digits of your Social Security Number (xxxx) and Date of Birth (mm/dd/yyyy) as indicated and click Submit. You will be taken to the next sign-up page. 5. Create a Homuork ID. This will be your Hom[employer] login ID and cannot be changed, so think of one that is secure and easy to remember. 6. Create a Hom[employer] password. You can change your password at any time. 7. Enter your Password Reset Question and Answer. This can be used at a later time if you forget your password. 8. Enter your e-mail address. You will receive e-mail notification when new information is available in [address] 9. Click Sign Up. You can now view and download portions of your medical record. 10. Click the Download Summary menu link to download a portable copy of your medical information. If you have questions, please visit the Frequently Asked Questions section of the [employer] website.  Remember, [employer] is NOT to be used for urgent needs. For medical emergencies, dial 911. Now available from your iPhone and Android! Please provide this summary of care documentation to your next provider. Your primary care clinician is listed as Miller Kimball. If you have any questions after today's visit, please call [phone].

## 2019-01-14 RX ORDER — TIZANIDINE 4 MG/1
TABLET ORAL
Qty: 90 TAB | Refills: 0 | Status: SHIPPED | OUTPATIENT
Start: 2019-01-14 | End: 2019-02-12 | Stop reason: SDUPTHER

## 2019-01-14 NOTE — TELEPHONE ENCOUNTER
Patient request, last OV 9/12/18, next scheduled 3/12/19.     Requested Prescriptions     Pending Prescriptions Disp Refills    tiZANidine (ZANAFLEX) 4 mg tablet 90 Tab 0

## 2019-02-12 DIAGNOSIS — E78.5 HYPERLIPIDEMIA, UNSPECIFIED HYPERLIPIDEMIA TYPE: ICD-10-CM

## 2019-02-12 RX ORDER — MONTELUKAST SODIUM 10 MG/1
TABLET ORAL
Qty: 90 TAB | Refills: 3 | Status: SHIPPED | OUTPATIENT
Start: 2019-02-12 | End: 2019-10-16

## 2019-02-12 RX ORDER — ROSUVASTATIN CALCIUM 5 MG/1
TABLET, COATED ORAL
Qty: 90 TAB | Refills: 3 | Status: SHIPPED | OUTPATIENT
Start: 2019-02-12 | End: 2019-11-29 | Stop reason: DRUGHIGH

## 2019-02-12 RX ORDER — TIZANIDINE 4 MG/1
TABLET ORAL
Qty: 90 TAB | Refills: 0 | Status: SHIPPED | OUTPATIENT
Start: 2019-02-12 | End: 2019-06-13 | Stop reason: SDUPTHER

## 2019-03-12 ENCOUNTER — OFFICE VISIT (OUTPATIENT)
Dept: INTERNAL MEDICINE CLINIC | Age: 59
End: 2019-03-12

## 2019-03-12 ENCOUNTER — DOCUMENTATION ONLY (OUTPATIENT)
Dept: INTERNAL MEDICINE CLINIC | Age: 59
End: 2019-03-12

## 2019-03-12 VITALS
OXYGEN SATURATION: 99 % | HEIGHT: 74 IN | WEIGHT: 223 LBS | BODY MASS INDEX: 28.62 KG/M2 | SYSTOLIC BLOOD PRESSURE: 148 MMHG | DIASTOLIC BLOOD PRESSURE: 80 MMHG | TEMPERATURE: 96.6 F | RESPIRATION RATE: 18 BRPM | HEART RATE: 84 BPM

## 2019-03-12 DIAGNOSIS — E11.9 TYPE 2 DIABETES MELLITUS WITHOUT COMPLICATION, WITHOUT LONG-TERM CURRENT USE OF INSULIN (HCC): ICD-10-CM

## 2019-03-12 DIAGNOSIS — I10 ESSENTIAL HYPERTENSION: Primary | ICD-10-CM

## 2019-03-12 RX ORDER — DESLORATADINE 5 MG/1
5 TABLET ORAL
COMMUNITY
End: 2019-07-15

## 2019-03-12 NOTE — PATIENT INSTRUCTIONS
DASH Diet: Care Instructions  Your Care Instructions    The DASH diet is an eating plan that can help lower your blood pressure. DASH stands for Dietary Approaches to Stop Hypertension. Hypertension is high blood pressure. The DASH diet focuses on eating foods that are high in calcium, potassium, and magnesium. These nutrients can lower blood pressure. The foods that are highest in these nutrients are fruits, vegetables, low-fat dairy products, nuts, seeds, and legumes. But taking calcium, potassium, and magnesium supplements instead of eating foods that are high in those nutrients does not have the same effect. The DASH diet also includes whole grains, fish, and poultry. The DASH diet is one of several lifestyle changes your doctor may recommend to lower your high blood pressure. Your doctor may also want you to decrease the amount of sodium in your diet. Lowering sodium while following the DASH diet can lower blood pressure even further than just the DASH diet alone. Follow-up care is a key part of your treatment and safety. Be sure to make and go to all appointments, and call your doctor if you are having problems. It's also a good idea to know your test results and keep a list of the medicines you take. How can you care for yourself at home? Following the DASH diet  · Eat 4 to 5 servings of fruit each day. A serving is 1 medium-sized piece of fruit, ½ cup chopped or canned fruit, 1/4 cup dried fruit, or 4 ounces (½ cup) of fruit juice. Choose fruit more often than fruit juice. · Eat 4 to 5 servings of vegetables each day. A serving is 1 cup of lettuce or raw leafy vegetables, ½ cup of chopped or cooked vegetables, or 4 ounces (½ cup) of vegetable juice. Choose vegetables more often than vegetable juice. · Get 2 to 3 servings of low-fat and fat-free dairy each day. A serving is 8 ounces of milk, 1 cup of yogurt, or 1 ½ ounces of cheese. · Eat 6 to 8 servings of grains each day.  A serving is 1 slice of bread, 1 ounce of dry cereal, or ½ cup of cooked rice, pasta, or cooked cereal. Try to choose whole-grain products as much as possible. · Limit lean meat, poultry, and fish to 2 servings each day. A serving is 3 ounces, about the size of a deck of cards. · Eat 4 to 5 servings of nuts, seeds, and legumes (cooked dried beans, lentils, and split peas) each week. A serving is 1/3 cup of nuts, 2 tablespoons of seeds, or ½ cup of cooked beans or peas. · Limit fats and oils to 2 to 3 servings each day. A serving is 1 teaspoon of vegetable oil or 2 tablespoons of salad dressing. · Limit sweets and added sugars to 5 servings or less a week. A serving is 1 tablespoon jelly or jam, ½ cup sorbet, or 1 cup of lemonade. · Eat less than 2,300 milligrams (mg) of sodium a day. If you limit your sodium to 1,500 mg a day, you can lower your blood pressure even more. Tips for success  · Start small. Do not try to make dramatic changes to your diet all at once. You might feel that you are missing out on your favorite foods and then be more likely to not follow the plan. Make small changes, and stick with them. Once those changes become habit, add a few more changes. · Try some of the following:  ? Make it a goal to eat a fruit or vegetable at every meal and at snacks. This will make it easy to get the recommended amount of fruits and vegetables each day. ? Try yogurt topped with fruit and nuts for a snack or healthy dessert. ? Add lettuce, tomato, cucumber, and onion to sandwiches. ? Combine a ready-made pizza crust with low-fat mozzarella cheese and lots of vegetable toppings. Try using tomatoes, squash, spinach, broccoli, carrots, cauliflower, and onions. ? Have a variety of cut-up vegetables with a low-fat dip as an appetizer instead of chips and dip. ? Sprinkle sunflower seeds or chopped almonds over salads. Or try adding chopped walnuts or almonds to cooked vegetables.   ? Try some vegetarian meals using beans and peas. Add garbanzo or kidney beans to salads. Make burritos and tacos with mashed gutiérrez beans or black beans. Where can you learn more? Go to http://adela-mily.info/. Enter P024 in the search box to learn more about \"DASH Diet: Care Instructions. \"  Current as of: July 22, 2018  Content Version: 11.9  © 0901-2125 [employer]. Care instructions adapted under license by [employer] (which disclaims liability or warranty for this information). If you have questions about a medical condition or this instruction, always ask your healthcare professional. [address] any warranty or liability for your use of this information.

## 2019-03-12 NOTE — PROGRESS NOTES
HISTORY OF PRESENT ILLNESS  Fabrizio Benjamin is a 59 y.o. male. 58 yo male here for f/u of HTN, DM. BP elevated on arrival. Improved on manual check. Better at recent ID visit. No CP, SOB  A1c has been controlled. Has gained weight but recently resumed going to the gym and making dietary changes. Glc fine on recent labs at ID (101). Review of Systems   Constitutional: Negative for chills, fever and weight loss. HENT: Negative for congestion and ear pain. Eyes: Negative for blurred vision and pain. Respiratory: Negative for cough and shortness of breath. Cardiovascular: Negative for chest pain, palpitations and leg swelling. Gastrointestinal: Negative for nausea and vomiting. Genitourinary: Negative for frequency and urgency. Musculoskeletal: Negative for joint pain and myalgias. Skin: Negative for itching and rash. Neurological: Negative for dizziness, tingling and headaches. Psychiatric/Behavioral: Negative for depression. The patient is not nervous/anxious. Past Medical History:   Diagnosis Date    Arthritis     established with Dr. Gerald Jean-Baptiste chiropractor    Autoimmune disease ([facility] [address]     Blindness     left eye; and periperal vision is absent in R    Diabetes [facility])    [facility] Consultant Dr. Blayne Roca HIV (human immunodeficiency virus infection) [facility]) dx Stephen Villatoro Ms. Tows; viral loads undetectable for 13 yrs    Hypercholesterolemia     Hypertension      Current Outpatient Medications on File Prior to Visit   Medication Sig Dispense Refill    desloratadine (CLARINEX) 5 mg tablet Take 5 mg by mouth.  eabswhupt-cllkbpmb-cxzbfny ala (BIKTARVY) tab tablet Take 1 Tab by mouth daily.       rosuvastatin (CRESTOR) 5 mg tablet TAKE 1 TABLET EVERY NIGHT 90 Tab 3    montelukast (SINGULAIR) 10 mg tablet TAKE 1 TABLET EVERY DAY FOR ALLERGIC RHINITIS 90 Tab 3    mometasone (NASONEX) 50 mcg/actuation nasal spray 2 Sprays by Both Nostrils route daily. 3 Container 0    tenofovir ALAFENAMIDE FUMARATE (VEMLIDY) 25 mg tab Take 25 mg by mouth daily. 90 Tab 3    abacavir-dolutegravir-lamiVUDine (TRIUMEQ) tablet Take 1 Tab by mouth daily. 90 Tab 3    tiZANidine (ZANAFLEX) 4 mg tablet TAKE 1 TABLET THREE TIMES DAILY AS NEEDED FOR MUSCLE SPASM 90 Tab 0    BIOTIN PO Take  by mouth.  magnesium 250 mg tab Take  by mouth.  timolol (TIMOPTIC) 0.5 % ophthalmic solution Administer 1 Drop to both eyes two (2) times a day.  brimonidine (ALPHAGAN P) 0.1 % ophthalmic solution every eight (8) hours.  OTHER Take  by mouth daily. Triumebq       triamcinolone acetonide 0.025 % lotion Apply  to affected area two (2) times a day.  didanosine (VIDEX EC) 400 mg capsule Take 400 mg by mouth daily.  omega-3 fatty acids-vitamin e 1,000 mg cap Take 1 Cap by mouth.  multivitamin (ONE A DAY) tablet Take 1 Tab by mouth daily.  ascorbic acid (VITAMIN C) 500 mg tablet Take 500 mg by mouth daily. No current facility-administered medications on file prior to visit. Social History     Tobacco Use    Smoking status: Former Smoker     Last attempt to quit: 2006     Years since quittin.6    Smokeless tobacco: Never Used    Tobacco comment: smoked for 30 years: 1/2 ppd. Substance Use Topics    Alcohol use: No     Alcohol/week: 0.0 oz    Drug use: No     Comment: prior marijuana, crack. -approx 20 years ago     Physical Exam   Constitutional: He appears well-developed and well-nourished. No distress. /80 (BP 1 Location: Right arm, BP Patient Position: Sitting)   Pulse 84   Temp 96.6 °F (35.9 °C) (Oral)   Resp 18   Ht 6' 2\" (1.88 m)   Wt 223 lb (101.2 kg)   SpO2 99%   BMI 28.63 kg/m²      Eyes: EOM are normal. Right eye exhibits no discharge. Left eye exhibits no discharge. No scleral icterus. Neck: Neck supple. Cardiovascular: Normal rate, regular rhythm and normal heart sounds.  Exam reveals no gallop and no friction rub. No murmur heard. Pulmonary/Chest: Effort normal and breath sounds normal. No respiratory distress. He has no wheezes. He has no rales. Musculoskeletal: He exhibits no edema or tenderness. Lymphadenopathy:     He has no cervical adenopathy. Neurological: He is alert. He exhibits normal muscle tone. Skin: Skin is warm and dry. Psychiatric: He has a normal mood and affect. Lab Results   Component Value Date/Time    Hemoglobin A1c 5.5 12/12/2017 12:00 AM    Hemoglobin A1c (POC) 5.3 09/12/2018 10:00 AM    Hemoglobin A1c, External 6.4 01/17/2017     Lab Results   Component Value Date/Time    Sodium 140 12/12/2017 12:00 AM    Potassium 4.5 12/12/2017 12:00 AM    Chloride 101 12/12/2017 12:00 AM    CO2 28 12/12/2017 12:00 AM    Anion gap 13.0 09/15/2016 09:02 AM    Glucose 93 12/12/2017 12:00 AM    BUN 10 12/12/2017 12:00 AM    Creatinine 1.03 12/12/2017 12:00 AM    BUN/Creatinine ratio 10 12/12/2017 12:00 AM    GFR est AA 91 12/12/2017 12:00 AM    GFR est non-AA 79 12/12/2017 12:00 AM    Calcium 9.3 12/12/2017 12:00 AM    Bilirubin, total 0.6 12/12/2017 12:00 AM    AST (SGOT) 14 12/12/2017 12:00 AM    Alk. phosphatase 83 12/12/2017 12:00 AM    Protein, total 6.6 12/12/2017 12:00 AM    Albumin 4.2 12/12/2017 12:00 AM    Globulin 2.9 09/15/2016 09:02 AM    A-G Ratio 1.8 12/12/2017 12:00 AM    ALT (SGPT) 15 12/12/2017 12:00 AM     Lab Results   Component Value Date/Time    WBC 5.0 09/15/2016 09:02 AM    HGB 14.5 09/15/2016 09:02 AM    HCT 44.7 09/15/2016 09:02 AM    PLATELET 610 05/26/9401 09:02 AM    MCV 95 09/15/2016 09:02 AM     ASSESSMENT and PLAN    ICD-10-CM ICD-9-CM    1. Essential hypertension I10 401.9    2. Type 2 diabetes mellitus without complication, without long-term current use of insulin (HCC) E11.9 250.00    Stable at this time. Will continue with current medication for now.    Continue to work on exercise, dietary changes

## 2019-03-12 NOTE — PROGRESS NOTES
Rm:18    Chief Complaint   Patient presents with    Diabetes    Hypertension     Depression Screening:  3 most recent [facility] Screens 3/12/2019 9/12/2018 4/12/2018 8/9/2017 5/23/2017 9/22/2016   Little interest or pleasure in doing things Not at all Not at all Not at all Not at all Not at all Not at all   Feeling down, depressed, irritable, or hopeless Not at all Not at all Not at all Not at all Not at all Not at all   Total Score PHQ 2 0 0 0 0 0 0       Learning Assessment:  Learning Assessment 8/9/2017 9/22/2016 7/8/2016   PRIMARY LEARNER Patient Patient Patient   HIGHEST LEVEL OF EDUCATION - PRIMARY LEARNER  4 YEARS OF COLLEGE 4 [address] [facility] [facility] CAREGIVER - No -   [facility] - -     VIDEOS - -   ANSWERED BY patient patient self   RELATIONSHIP SELF SELF SELF       Abuse Screening:  Abuse Screening Questionnaire 9/22/2016   Do you ever feel afraid of your partner? N   Are you in a relationship with someone who physically or mentally threatens you? N   Is it safe for you to go home? Y       Health Maintenance reviewed and discussed per provider: yes     Coordination of Care:    1. Have you been to the ER, urgent care clinic since your last visit? Hospitalized since your last visit? no    2. Have you seen or consulted any other health care providers outside of the [address] [place] [address] since your last visit? Include any pap smears or colon screening.  no

## 2019-03-12 NOTE — PROGRESS NOTES
Pharmacist Note: Immunization Update    Patient: Marietta Chang (71 y.o., 1960)     Patient's immunization history was updated to reflect information contained in the [place] and/or outside immunization/pharmacy records were reconciled within [address] [facility]. Health Maintenance schedule updated when appropriate.     Current immunizations now reflect:       Immunization History   Administered Date(s) Administered    Hep A Vaccine 09/02/2010, 10/07/2010, 12/12/2018    Hep B Vaccine 09/02/2010, 10/07/2010, 09/03/2014    Influenza Vaccine 10/17/2011, 11/08/2012, 09/19/2013, 09/03/2014    Pneumococcal Conjugate (PCV-13) 06/10/2013    Pneumococcal Polysaccharide (PPSV-23) 01/22/2009, 03/19/2014    Tdap 10/17/2011       Keily Faust, PharmD, BCACP

## 2019-04-30 ENCOUNTER — TELEPHONE (OUTPATIENT)
Dept: INTERNAL MEDICINE CLINIC | Age: 59
End: 2019-04-30

## 2019-04-30 NOTE — TELEPHONE ENCOUNTER
Patient is calling in to request something different for his sinus and allergies, states the singular in not working well. Would like the Rx sent to his mail order pharmacy.

## 2019-05-01 NOTE — TELEPHONE ENCOUNTER
What over the counter products has he tried? Claritin, Allegra, Zyrtec, Flonase? It also seems Nasonex was on his listed. Which have worked? Has he tried alternating them?

## 2019-05-01 NOTE — TELEPHONE ENCOUNTER
Patient contacted at home number. 2 patient identifiers confirmed. Patient informed of below. Patient verbalized understanding. 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 Patient advised to alternate the Claritin and nasal spray so he is not taking Claritin everyday. No other questions at this time.

## 2019-06-14 RX ORDER — TIZANIDINE 4 MG/1
TABLET ORAL
Qty: 90 TAB | Refills: 0 | Status: SHIPPED | OUTPATIENT
Start: 2019-06-14 | End: 2019-07-15

## 2019-07-03 ENCOUNTER — APPOINTMENT (OUTPATIENT)
Dept: GENERAL RADIOLOGY | Age: 59
End: 2019-07-03
Attending: PHYSICIAN ASSISTANT
Payer: MEDICARE

## 2019-07-03 ENCOUNTER — HOSPITAL ENCOUNTER (EMERGENCY)
Age: 59
Discharge: HOME | End: 2019-07-03
Attending: EMERGENCY MEDICINE
Payer: MEDICARE

## 2019-07-03 VITALS
BODY MASS INDEX: 29.82 KG/M2 | HEART RATE: 77 BPM | SYSTOLIC BLOOD PRESSURE: 176 MMHG | WEIGHT: 225 LBS | HEIGHT: 73 IN | DIASTOLIC BLOOD PRESSURE: 89 MMHG | TEMPERATURE: 97 F | OXYGEN SATURATION: 99 % | RESPIRATION RATE: 16 BRPM

## 2019-07-03 DIAGNOSIS — S16.1XXA NECK STRAIN, INITIAL ENCOUNTER: ICD-10-CM
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
## 2019-07-03 DIAGNOSIS — S39.012A LOW BACK STRAIN, INITIAL ENCOUNTER: ICD-10-CM

## 2019-07-03 PROCEDURE — 72100 X-RAY EXAM L-S SPINE 2/3 VWS: CPT

## 2019-07-03 PROCEDURE — 99282 EMERGENCY DEPT VISIT SF MDM: CPT

## 2019-07-03 PROCEDURE — 72040 X-RAY EXAM NECK SPINE 2-3 VW: CPT

## 2019-07-03 RX ORDER — NAPROXEN 500 MG/1
500 TABLET ORAL 2 TIMES DAILY WITH MEALS
Qty: 20 TAB | Refills: 0 | Status: SHIPPED | OUTPATIENT
Start: 2019-07-03 | End: 2019-07-13
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
## 2019-07-15 ENCOUNTER — APPOINTMENT (OUTPATIENT)
Dept: GENERAL RADIOLOGY | Age: 59
End: 2019-07-15
Attending: PHYSICIAN ASSISTANT
Payer: MEDICARE

## 2019-07-15 ENCOUNTER — HOSPITAL ENCOUNTER (EMERGENCY)
Age: 59
Discharge: HOME | End: 2019-07-15
Attending: EMERGENCY MEDICINE
Payer: MEDICARE

## 2019-07-15 ENCOUNTER — APPOINTMENT (OUTPATIENT)
Dept: CT IMAGING | Age: 59
End: 2019-07-15
Attending: PHYSICIAN ASSISTANT
Payer: MEDICARE

## 2019-07-15 VITALS
WEIGHT: 225 LBS | HEART RATE: 64 BPM | RESPIRATION RATE: 14 BRPM | HEIGHT: 74 IN | OXYGEN SATURATION: 100 % | BODY MASS INDEX: 28.88 KG/M2 | SYSTOLIC BLOOD PRESSURE: 162 MMHG | TEMPERATURE: 98.6 F | DIASTOLIC BLOOD PRESSURE: 83 MMHG

## 2019-07-15 DIAGNOSIS — G44.309 POST-TRAUMATIC HEADACHE, NOT INTRACTABLE, UNSPECIFIED CHRONICITY PATTERN: ICD-10-CM

## 2019-07-15 DIAGNOSIS — S39.012A LOW BACK STRAIN, INITIAL ENCOUNTER: ICD-10-CM

## 2019-07-15 DIAGNOSIS — S16.1XXA NECK STRAIN, INITIAL ENCOUNTER: ICD-10-CM

## 2019-07-15 DIAGNOSIS — M25.551 RIGHT HIP PAIN: ICD-10-CM

## 2019-07-15 DIAGNOSIS — R03.0 ELEVATED BLOOD PRESSURE READING: ICD-10-CM
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
## 2019-07-15 PROCEDURE — 70450 CT HEAD/BRAIN W/O DYE: CPT

## 2019-07-15 PROCEDURE — 99282 EMERGENCY DEPT VISIT SF MDM: CPT

## 2019-07-15 PROCEDURE — 72125 CT NECK SPINE W/O DYE: CPT

## 2019-07-15 PROCEDURE — 73502 X-RAY EXAM HIP UNI 2-3 VIEWS: CPT

## 2019-07-15 RX ORDER — CYCLOBENZAPRINE HCL 10 MG
10 TABLET ORAL
Qty: 20 TAB | Refills: 0 | Status: SHIPPED | OUTPATIENT
Start: 2019-07-15 | End: 2019-07-20

## 2019-07-15 RX ORDER — HYDROCODONE BITARTRATE AND ACETAMINOPHEN 5; 325 MG/1; MG/1
1 TABLET ORAL
Qty: 10 TAB | Refills: 0 | Status: SHIPPED | OUTPATIENT
Start: 2019-07-15 | End: 2019-07-18
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
## 2019-07-15 NOTE — DISCHARGE INSTRUCTIONS
Patient Education        Back Strain: Care Instructions  Overview    A back strain happens when you overstretch, or pull, a muscle in your back. You may hurt your back in an accident or when you exercise or lift something. Sometimes you may not know how you hurt your back. Most back pain will get better with rest and time. You can take care of yourself at home to help your back heal.  Follow-up care is a key part of your treatment and safety. Be sure to make and go to all appointments, and call your doctor if you are having problems. It's also a good idea to know your test results and keep a list of the medicines you take. How can you care for yourself at home? · Try to stay as active as you can, but stop or reduce any activity that causes pain. · Put ice or a cold pack on the sore muscle for 10 to 20 minutes at a time to stop swelling. Try this every 1 to 2 hours for 3 days (when you are awake) or until the swelling goes down. Put a thin cloth between the ice pack and your skin. · After 2 or 3 days, apply a heating pad on low or a warm cloth to your back. Some doctors suggest that you go back and forth between hot and cold treatments. · Take pain medicines exactly as directed. ? If the doctor gave you a prescription medicine for pain, take it as prescribed. ? If you are not taking a prescription pain medicine, ask your doctor if you can take an over-the-counter medicine. · Try sleeping on your side with a pillow between your legs. Or put a pillow under your knees when you lie on your back. These measures can ease pain in your lower back. · Return to your usual level of activity slowly. When should you call for help? Call 911 anytime you think you may need emergency care. For example, call if:    · You are unable to move a leg at all.   [facility] your doctor now or seek immediate medical care if:    · You have new or worse symptoms in your legs, belly, or buttocks.  Symptoms may include:  ? Numbness or tingling. ? Weakness. ? Pain.     · You lose bladder or bowel control.    Watch closely for changes in your health, and be sure to contact your doctor if:    · You have a fever, lose weight, or don't feel well.     · You are not getting better as expected. Where can you learn more? Go to http://adela-mily.info/. Enter K546 in the search box to learn more about \"Back Strain: Care Instructions. \"  Current as of: September 20, 2018  Content Version: 11.9  © 2809-3631 [employer]. Care instructions adapted under license by [employer] (which disclaims liability or warranty for this information). If you have questions about a medical condition or this instruction, always ask your healthcare professional. [address] any warranty or liability for your use of this information. Patient Education        Headache: Care Instructions  Your Care Instructions    Headaches have many possible causes. Most headaches aren't a sign of a more serious problem, and they will get better on their own. Home treatment may help you feel better faster. The doctor has checked you carefully, but problems can develop later. If you notice any problems or new symptoms, get medical treatment right away. Follow-up care is a key part of your treatment and safety. Be sure to make and go to all appointments, and call your doctor if you are having problems. It's also a good idea to know your test results and keep a list of the medicines you take. How can you care for yourself at home? · Do not drive if you have taken a prescription pain medicine. · Rest in a quiet, dark room until your headache is gone. Close your eyes and try to relax or go to sleep. Don't watch TV or read. · Put a cold, moist cloth or cold pack on the painful area for 10 to 20 minutes at a time. Put a thin cloth between the cold pack and your skin.   · Use a warm, moist towel or a heating pad set on low to relax tight shoulder and neck muscles. · Have someone gently massage your neck and shoulders. · Take pain medicines exactly as directed. ? If the doctor gave you a prescription medicine for pain, take it as prescribed. ? If you are not taking a prescription pain medicine, ask your doctor if you can take an over-the-counter medicine. · Be careful not to take pain medicine more often than the instructions allow, because you may get worse or more frequent headaches when the medicine wears off. · Do not ignore new symptoms that occur with a headache, such as a fever, weakness or numbness, vision changes, or confusion. These may be signs of a more serious problem. To prevent headaches  · Keep a headache diary so you can figure out what triggers your headaches. Avoiding triggers may help you prevent headaches. Record when each headache began, how long it lasted, and what the pain was like (throbbing, aching, stabbing, or dull). Write down any other symptoms you had with the headache, such as nausea, flashing lights or dark spots, or sensitivity to bright light or loud noise. Note if the headache occurred near your period. List anything that might have triggered the headache, such as certain foods (chocolate, cheese, wine) or odors, smoke, bright light, stress, or lack of sleep. · Find healthy ways to deal with stress. Headaches are most common during or right after stressful times. Take time to relax before and after you do something that has caused a headache in the past.  · Try to keep your muscles relaxed by keeping good posture. Check your jaw, face, neck, and shoulder muscles for tension, and try relaxing them. When sitting at a desk, change positions often, and stretch for 30 seconds each hour. · Get plenty of sleep and exercise. · Eat regularly and well. Long periods without food can trigger a headache. · Treat yourself to a massage.  Some people find that regular massages are very helpful in relieving tension. · Limit caffeine by not drinking too much coffee, tea, or soda. But don't quit caffeine suddenly, because that can also give you headaches. · Reduce eyestrain from computers by blinking frequently and looking away from the computer screen every so often. Make sure you have proper eyewear and that your monitor is set up properly, about an arm's length away. · Seek help if you have depression or anxiety. Your headaches may be linked to these conditions. Treatment can both prevent headaches and help with symptoms of anxiety or depression. When should you call for help? Call 911 anytime you think you may need emergency care. For example, call if:    · You have signs of a stroke. These may include:  ? Sudden numbness, paralysis, or weakness in your face, arm, or leg, especially on only one side of your body. ? Sudden vision changes. ? Sudden trouble speaking. ? Sudden confusion or trouble understanding simple statements. ? Sudden problems with walking or balance. ? A sudden, severe headache that is different from past headaches.    Call your doctor now or seek immediate medical care if:    · You have a new or worse headache.     · Your headache gets much worse. Where can you learn more? Go to http://adela-mily.info/. Enter M271 in the search box to learn more about \"Headache: Care Instructions. \"  Current as of: Amber 3, 2018  Content Version: 11.9  © 7513-6279 [employer]. Care instructions adapted under license by [employer] (which disclaims liability or warranty for this information). If you have questions about a medical condition or this instruction, always ask your healthcare professional. [place] [address] any warranty or liability for your use of this information. Patient Education        Neck Strain: Care Instructions  Your Care Instructions    You have strained the muscles and ligaments in your neck.  A sudden, awkward movement can strain the neck. This often occurs with falls or car accidents or during certain sports. Everyday activities like working on a computer or sleeping can also cause neck strain if they force you to hold your neck in an awkward position for a long time. It is common for neck pain to get worse for a day or two after an injury, but it should start to feel better after that. You may have more pain and stiffness for several days before it gets better. This is expected. It may take a few weeks or longer for it to heal completely. Good home treatment can help you get better faster and avoid future neck problems. Follow-up care is a key part of your treatment and safety. Be sure to make and go to all appointments, and call your doctor if you are having problems. It's also a good idea to know your test results and keep a list of the medicines you take. How can you care for yourself at home? · If you were given a neck brace (cervical collar) to limit neck motion, wear it as instructed for as many days as your doctor tells you to. Do not wear it longer than you were told to. Wearing a brace for too long can make neck stiffness worse and weaken the neck muscles. · You can try using heat or ice to see if it helps. ? Try using a heating pad on a low or medium setting for 15 to 20 minutes every 2 to 3 hours. Try a warm shower in place of one session with the heating pad. You can also buy single-use heat wraps that last up to 8 hours. ? You can also try an ice pack for 10 to 15 minutes every 2 to 3 hours. · Take pain medicines exactly as directed. ? If the doctor gave you a prescription medicine for pain, take it as prescribed. ? If you are not taking a prescription pain medicine, ask your doctor if you can take an over-the-counter medicine. · Gently rub the area to relieve pain and help with blood flow. Do not massage the area if it hurts to do so. · Do not do anything that makes the pain worse.  Take it easy for a couple of days. You can do your usual activities if they do not hurt your neck or put it at risk for more stress or injury. · Try sleeping on a special neck pillow. Place it under your neck, not under your head. Placing a tightly rolled-up towel under your neck while you sleep will also work. If you use a neck pillow or rolled towel, do not use your regular pillow at the same time. · To prevent future neck pain, do exercises to stretch and strengthen your neck and back. Learn how to use good posture, safe lifting techniques, and proper body mechanics. When should you call for help? Call 911 anytime you think you may need emergency care. For example, call if:    · You are unable to move an arm or a leg at all.   [facility] your doctor now or seek immediate medical care if:    · You have new or worse symptoms in your arms, legs, chest, belly, or buttocks. Symptoms may include:  ? Numbness or tingling. ? Weakness. ? Pain.     · You lose bladder or bowel control.    Watch closely for changes in your health, and be sure to contact your doctor if:    · You are not getting better as expected. Where can you learn more? Go to http://adela-mily.info/. Enter M253 in the search box to learn more about \"Neck Strain: Care Instructions. \"  Current as of: September 20, 2018  Content Version: 11.9  © 9014-6361 [employer]. Care instructions adapted under license by [employer] (which disclaims liability or warranty for this information). If you have questions about a medical condition or this instruction, always ask your healthcare professional. [address] any warranty or liability for your use of this information.
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
## 2019-07-20 ENCOUNTER — OFFICE VISIT (OUTPATIENT)
Dept: INTERNAL MEDICINE CLINIC | Age: 59
End: 2019-07-20

## 2019-07-20 VITALS
HEART RATE: 69 BPM | BODY MASS INDEX: 29 KG/M2 | WEIGHT: 226 LBS | RESPIRATION RATE: 16 BRPM | HEIGHT: 74 IN | TEMPERATURE: 98 F | OXYGEN SATURATION: 98 % | SYSTOLIC BLOOD PRESSURE: 161 MMHG | DIASTOLIC BLOOD PRESSURE: 92 MMHG

## 2019-07-20 DIAGNOSIS — M54.2 NECK PAIN: Primary | ICD-10-CM

## 2019-07-20 DIAGNOSIS — H93.13 TINNITUS OF BOTH EARS: ICD-10-CM

## 2019-07-20 DIAGNOSIS — G44.89 OTHER HEADACHE SYNDROME: ICD-10-CM

## 2019-07-20 RX ORDER — THERA TABS 400 MCG
1 TAB ORAL
COMMUNITY

## 2019-07-20 RX ORDER — DICLOFENAC SODIUM 10 MG/G
GEL TOPICAL 4 TIMES DAILY
Qty: 100 G | Refills: 1 | Status: SHIPPED | OUTPATIENT
Start: 2019-07-20 | End: 2019-11-29

## 2019-07-20 RX ORDER — METHYLPREDNISOLONE 4 MG/1
TABLET ORAL
Qty: 1 DOSE PACK | Refills: 0 | Status: SHIPPED | OUTPATIENT
Start: 2019-07-20 | End: 2019-08-20

## 2019-07-20 RX ORDER — PRAVASTATIN SODIUM 10 MG/1
10 TABLET ORAL
COMMUNITY
End: 2019-10-16

## 2019-07-20 RX ORDER — ATAZANAVIR 300 MG/1
300 CAPSULE ORAL
COMMUNITY
End: 2019-10-16

## 2019-07-20 RX ORDER — DIDANOSINE 400 MG/1
400 CAPSULE, DELAYED RELEASE ORAL
COMMUNITY
End: 2019-10-16

## 2019-07-20 RX ORDER — LISINOPRIL 20 MG/1
20 TABLET ORAL
COMMUNITY
End: 2019-10-16 | Stop reason: SINTOL

## 2019-07-20 RX ORDER — HYDROCHLOROTHIAZIDE 12.5 MG/1
12.5 CAPSULE ORAL
COMMUNITY
End: 2019-10-16
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
## 2019-08-18 RX ORDER — TIZANIDINE 4 MG/1
TABLET ORAL
Qty: 90 TAB | Refills: 0 | Status: SHIPPED | OUTPATIENT
Start: 2019-08-18 | End: 2019-09-24 | Stop reason: SDUPTHER

## 2019-08-20 ENCOUNTER — OFFICE VISIT (OUTPATIENT)
Dept: INTERNAL MEDICINE CLINIC | Age: 59
End: 2019-08-20

## 2019-08-20 VITALS
DIASTOLIC BLOOD PRESSURE: 79 MMHG | RESPIRATION RATE: 17 BRPM | HEART RATE: 79 BPM | WEIGHT: 229 LBS | HEIGHT: 74 IN | BODY MASS INDEX: 29.39 KG/M2 | OXYGEN SATURATION: 99 % | SYSTOLIC BLOOD PRESSURE: 154 MMHG | TEMPERATURE: 98.4 F

## 2019-08-20 DIAGNOSIS — I10 BENIGN HYPERTENSION WITHOUT CHF: ICD-10-CM

## 2019-08-20 DIAGNOSIS — E78.5 DYSLIPIDEMIA: ICD-10-CM

## 2019-08-20 DIAGNOSIS — F41.9 ANXIETY: ICD-10-CM

## 2019-08-20 DIAGNOSIS — H93.11 TINNITUS OF RIGHT EAR: Primary | ICD-10-CM

## 2019-08-20 DIAGNOSIS — G89.29 CHRONIC BILATERAL LOW BACK PAIN WITHOUT SCIATICA: ICD-10-CM

## 2019-08-20 DIAGNOSIS — M54.2 NECK PAIN: ICD-10-CM

## 2019-08-20 DIAGNOSIS — M54.50 CHRONIC BILATERAL LOW BACK PAIN WITHOUT SCIATICA: ICD-10-CM

## 2019-08-20 NOTE — PATIENT INSTRUCTIONS
1) Follow-up in 3 weeks or sooner if worsening symptoms. 2) Monitor blood pressure and let us know if too high or too low. 3) You can use debrox for ear wax removal in right ear. Earwax Blockage: Care Instructions  Your Care Instructions    Earwax is a natural substance that protects the ear canal. Normally, earwax drains from the ears and does not cause problems. Sometimes earwax builds up and hardens. Earwax blockage (also called cerumen impaction) can cause some loss of hearing and pain. When wax is tightly packed, you will need to have your doctor remove it. Follow-up care is a key part of your treatment and safety. Be sure to make and go to all appointments, and call your doctor if you are having problems. It's also a good idea to know your test results and keep a list of the medicines you take. How can you care for yourself at home? · Do not try to remove earwax with cotton swabs, fingers, or other objects. This can make the blockage worse and damage the eardrum. · If your doctor recommends that you try to remove earwax at home:  ? Soften and loosen the earwax with warm mineral oil. You also can try hydrogen peroxide mixed with an equal amount of room temperature water. Place 2 drops of the fluid, warmed to body temperature, in the ear two times a day for up to 5 days. ? Once the wax is loose and soft, all that is usually needed to remove it from the ear canal is a gentle, warm shower. Direct the water into the ear, then tip your head to let the earwax drain out. Dry your ear thoroughly with a hair dryer set on low. Hold the dryer several inches from your ear. ? If the warm mineral oil and shower do not work, use an over-the-counter wax softener. Read and follow all instructions on the label. After using the wax softener, use an ear syringe to gently flush the ear. Make sure the flushing solution is body temperature. Cool or hot fluids in the ear can cause dizziness.   When should you call for help? Call your doctor now or seek immediate medical care if:    · Pus or blood drains from your ear.     · Your ears are ringing or feel full.     · You have a loss of hearing.    Watch closely for changes in your health, and be sure to contact your doctor if:    · You have pain or reduced hearing after 1 week of home treatment.     · You have any new symptoms, such as nausea or balance problems. Where can you learn more? Go to http://adela-mily.info/. Enter D397 in the search box to learn more about \"Earwax Blockage: Care Instructions. \"  Current as of: September 23, 2018  Content Version: 12.1  © 2633-1464 [employer]. Care instructions adapted under license by [employer] (which disclaims liability or warranty for this information). If you have questions about a medical condition or this instruction, always ask your healthcare professional. [address] any warranty or liability for your use of this information.
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
## 2019-08-20 NOTE — PROGRESS NOTES
ROOM # 1  Identified pt with two pt identifiers(name and ). Reviewed record in preparation for visit and have obtained necessary documentation. Chief Complaint   Patient presents with    Neck Pain     f/u    Ringing in Ear     f/u      Vicky Simmonds preferred language for health care discussion is english/other. Is the patient using any DME equipment during OV? Alvino Matthews is due for:  Health Maintenance Due   Topic    EYE EXAM RETINAL OR DILATED     Shingrix Vaccine Age 50> (1 of 2)    MICROALBUMIN Q1     MEDICARE YEARLY EXAM     FOOT EXAM Q1     LIPID PANEL Q1     HEMOGLOBIN A1C Q6M     Influenza Age 5 to Adult     COLONOSCOPY      Health Maintenance reviewed and discussed per provider  Please order/place referral if appropriate. Advance Directive:  1. Do you have an advance directive in place? Patient Reply: NO    2. If not, would you like material regarding how to put one in place? NO    Coordination of Care:  1. Have you been to the ER, urgent care clinic since your last visit? Hospitalized since your last visit? NO    2. Have you seen or consulted any other health care providers outside of the [address] [place] [address] since your last visit? Include any pap smears or colon screening. NO    Patient is accompanied by self I have received verbal consent from Vicky Simmonds to discuss any/all medical information while they are present in the room.     Learning Assessment:  Learning Assessment 2017   PRIMARY LEARNER Patient Patient Patient   HIGHEST LEVEL OF EDUCATION - PRIMARY LEARNER  4 YEARS OF COLLEGE 4 YEARS OF COLLEGE -   BARRIERS PRIMARY LEARNER NONE NONE NONE   CO-LEARNER CAREGIVER - No -   PRIMARY LANGUAGE ENGLISH ENGLISH ENGLISH   LEARNER PREFERENCE PRIMARY DEMONSTRATION DEMONSTRATION DEMONSTRATION     LISTENING - -     VIDEOS - -   ANSWERED BY patient patient self   RELATIONSHIP SELF SELF SELF     Depression Screening:  3 most recent PHQ Screens 7/20/2019 3/12/2019 9/12/2018 4/12/2018 8/9/2017 5/23/2017 9/22/2016   Little interest or pleasure in doing things Not at all Not at all Not at all Not at all Not at all Not at all Not at all   Feeling down, depressed, irritable, or hopeless Not at all Not at all Not at all Not at all Not at all Not at all Not at all   Total Score PHQ 2 0 0 0 0 0 0 0     Abuse Screening:  Abuse Screening Questionnaire 9/22/2016   Do you ever feel afraid of your partner? N   Are you in a relationship with someone who physically or mentally threatens you? N   Is it safe for you to go home? Y     Fall Risk  Fall Risk Assessment, last 12 mths 5/23/2017   Able to walk? Yes   Fall in past 12 months?  No

## 2019-09-25 ENCOUNTER — DOCUMENTATION ONLY (OUTPATIENT)
Dept: INTERNAL MEDICINE CLINIC | Age: 59
End: 2019-09-25

## 2019-09-25 RX ORDER — TIZANIDINE 4 MG/1
TABLET ORAL
Qty: 90 TAB | Refills: 0 | Status: SHIPPED | OUTPATIENT
Start: 2019-09-25 | End: 2019-10-16 | Stop reason: SDUPTHER

## 2019-09-25 NOTE — TELEPHONE ENCOUNTER
Pt has appt. With new PCP on 10/16/19, Dr. Eder Vega.     Sent electronically 30 day supply:    Requested Prescriptions     Signed Prescriptions Disp Refills    tiZANidine (ZANAFLEX) 4 mg tablet 90 Tab 0     Sig: TAKE 1 TABLET THREE TIMES DAILY AS NEEDED FOR MUSCLE SPASM     Authorizing Provider: Jim Donaldson

## 2019-09-25 NOTE — PROGRESS NOTES
Faxed [facility] ov notes 9/10/19 to [facility] at [phone] on 9/24/19 @ 4354.   Appt 10/16/19 with Dr. Lillie Samaniego

## 2019-10-16 ENCOUNTER — OFFICE VISIT (OUTPATIENT)
Dept: FAMILY MEDICINE CLINIC | Age: 59
End: 2019-10-16

## 2019-10-16 VITALS
BODY MASS INDEX: 29.65 KG/M2 | DIASTOLIC BLOOD PRESSURE: 96 MMHG | TEMPERATURE: 96.3 F | WEIGHT: 231 LBS | SYSTOLIC BLOOD PRESSURE: 155 MMHG | RESPIRATION RATE: 14 BRPM | HEIGHT: 74 IN | HEART RATE: 72 BPM | OXYGEN SATURATION: 99 %

## 2019-10-16 DIAGNOSIS — I10 ESSENTIAL HYPERTENSION: Primary | ICD-10-CM

## 2019-10-16 DIAGNOSIS — B20 HIV INFECTION, UNSPECIFIED SYMPTOM STATUS (HCC): ICD-10-CM

## 2019-10-16 DIAGNOSIS — G89.4 CHRONIC PAIN SYNDROME: ICD-10-CM

## 2019-10-16 DIAGNOSIS — F41.9 ANXIETY: ICD-10-CM

## 2019-10-16 DIAGNOSIS — H40.9 GLAUCOMA OF RIGHT EYE, UNSPECIFIED GLAUCOMA TYPE: ICD-10-CM

## 2019-10-16 DIAGNOSIS — M62.838 MUSCLE SPASM: ICD-10-CM

## 2019-10-16 RX ORDER — TIZANIDINE 4 MG/1
TABLET ORAL
Qty: 90 TAB | Refills: 1 | Status: SHIPPED | OUTPATIENT
Start: 2019-10-16 | End: 2020-02-19 | Stop reason: SDUPTHER

## 2019-10-16 RX ORDER — MECLIZINE HYDROCHLORIDE 25 MG/1
25 TABLET ORAL
COMMUNITY
End: 2019-11-29

## 2019-10-16 RX ORDER — AMLODIPINE BESYLATE 5 MG/1
5 TABLET ORAL DAILY
Qty: 30 TAB | Refills: 3 | Status: SHIPPED | OUTPATIENT
Start: 2019-10-16 | End: 2019-11-29 | Stop reason: DRUGHIGH

## 2019-10-16 RX ORDER — MULTIVITAMIN
TABLET ORAL
COMMUNITY

## 2019-10-16 RX ORDER — LANOLIN ALCOHOL/MO/W.PET/CERES
1000 CREAM (GRAM) TOPICAL DAILY
COMMUNITY
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
## 2019-10-16 NOTE — PROGRESS NOTES
1. Have you been to the ER, urgent care clinic since your last visit? Hospitalized since your last visit? No    2. Have you seen or consulted any other health care providers outside of the [address] [place] [address] since your last visit? Include any pap smears or colon screening.  No

## 2019-10-16 NOTE — PATIENT INSTRUCTIONS
Anxiety Disorder: Care Instructions  Your Care Instructions    Anxiety is a normal reaction to stress. Difficult situations can cause you to have symptoms such as sweaty palms and a nervous feeling. In an anxiety disorder, the symptoms are far more severe. Constant worry, muscle tension, trouble sleeping, nausea and diarrhea, and other symptoms can make normal daily activities difficult or impossible. These symptoms may occur for no reason, and they can affect your work, school, or social life. Medicines, counseling, and self-care can all help. Follow-up care is a key part of your treatment and safety. Be sure to make and go to all appointments, and call your doctor if you are having problems. It's also a good idea to know your test results and keep a list of the medicines you take. How can you care for yourself at home? · Take medicines exactly as directed. Call your doctor if you think you are having a problem with your medicine. · Go to your counseling sessions and follow-up appointments. · Recognize and accept your anxiety. Then, when you are in a situation that makes you anxious, say to yourself, \"This is not an emergency. I feel uncomfortable, but I am not in danger. I can keep going even if I feel anxious. \"  · Be kind to your body:  ? Relieve tension with exercise or a massage. ? Get enough rest.  ? Avoid alcohol, caffeine, nicotine, and illegal drugs. They can increase your anxiety level and cause sleep problems. ? Learn and do relaxation techniques. See below for more about these techniques. · Engage your mind. Get out and do something you enjoy. Go to a funny movie, or take a walk or hike. Plan your day. Having too much or too little to do can make you anxious. · Keep a record of your symptoms. Discuss your fears with a good friend or family member, or join a support group for people with similar problems. Talking to others sometimes relieves stress.   · Get involved in social groups, or volunteer to help others. Being alone sometimes makes things seem worse than they are. · Get at least 30 minutes of exercise on most days of the week to relieve stress. Walking is a good choice. You also may want to do other activities, such as running, swimming, cycling, or playing tennis or team sports. Relaxation techniques  Do relaxation exercises 10 to 20 minutes a day. You can play soothing, relaxing music while you do them, if you wish. · Tell others in your house that you are going to do your relaxation exercises. Ask them not to disturb you. · Find a comfortable place, away from all distractions and noise. · Lie down on your back, or sit with your back straight. · Focus on your breathing. Make it slow and steady. · Breathe in through your nose. Breathe out through either your nose or mouth. · Breathe deeply, filling up the area between your navel and your rib cage. Breathe so that your belly goes up and down. · Do not hold your breath. · Breathe like this for 5 to 10 minutes. Notice the feeling of calmness throughout your whole body. As you continue to breathe slowly and deeply, relax by doing the following for another 5 to 10 minutes:  · Tighten and relax each muscle group in your body. You can begin at your toes and work your way up to your head. · Imagine your muscle groups relaxing and becoming heavy. · Empty your mind of all thoughts. · Let yourself relax more and more deeply. · Become aware of the state of calmness that surrounds you. · When your relaxation time is over, you can bring yourself back to alertness by moving your fingers and toes and then your hands and feet and then stretching and moving your entire body. Sometimes people fall asleep during relaxation, but they usually wake up shortly afterward. · Always give yourself time to return to full alertness before you drive a car or do anything that might cause an accident if you are not fully alert.  Never play a relaxation tape while you drive a car. When should you call for help? Call 911 anytime you think you may need emergency care. For example, call if:    · You feel you cannot stop from hurting yourself or someone else.   Izzy Wolfe the numbers for these national suicide hotlines: 5-021-905-TALK (5-327.650.9167) and 3-075-RDFNBSU (8-144.856.8525). If you or someone you know talks about suicide or feeling hopeless, get help right away.   Watch closely for changes in your health, and be sure to contact your doctor if:    · You have anxiety or fear that affects your life.     · You have symptoms of anxiety that are new or different from those you had before. Where can you learn more? Go to http://adelaPeak8 Partnersmily.info/. Enter P754 in the search box to learn more about \"Anxiety Disorder: Care Instructions. \"  Current as of: May 28, 2019  Content Version: 12.2  © 7597-5223 [facility]. Care instructions adapted under license by [employer] (which disclaims liability or warranty for this information). If you have questions about a medical condition or this instruction, always ask your healthcare professional. [place] [address] any warranty or liability for your use of this information. Neck Spasm: Care Instructions  Your Care Instructions  A neck spasm is sudden tightness and pain in your neck muscles. A spasm may be caused by some activities or repeated movements. For example, you may be more likely to have a neck spasm if you slouch, paint a ceiling, work at a computer, or sleep with your neck twisted. But the cause isn't always clear. Home treatment includes using heat or ice, taking over-the-counter (OTC) pain medicines, and avoiding activities that may lead to neck pain. Gentle stretching, or treatments such as massage or manipulation, may also help ease a neck spasm. For a neck spasm that doesn't get better with home care, your doctor may prescribe medicine.  He or she may also suggest exercise or physical therapy to help strengthen or relax your neck muscles. Follow-up care is a key part of your treatment and safety. Be sure to make and go to all appointments, and call your doctor if you are having problems. It's also a good idea to know your test results and keep a list of the medicines you take. How can you care for yourself at home? · To relieve pain, use heat or ice (whichever feels better) on the affected area. ? Put a warm water bottle, a heating pad set on low, or a warm cloth on your neck. Put a thin cloth between the heating pad and your skin. Do not go to sleep with a heating pad on your skin. ? Try ice or a cold pack on the area for 10 to 20 minutes at a time. Put a thin cloth between the ice and your skin. · Ask your doctor if you can take acetaminophen (such as Tylenol) or nonsteroidal anti-inflammatory drugs, such as ibuprofen or naproxen. Your doctor can prescribe stronger medicines if needed. Be safe with medicines. Read and follow all instructions on the label. · Stretch your muscles every day, especially before and after exercise and at bedtime. Regular stretching can help relax your muscles. · Try to find a pillow and a position in bed that help improve your night's rest.  · Try to stay active. It's best to start activity slowly. If an exercise makes your pain worse, stop doing it. When should you call for help? Call 911 anytime you think you may need emergency care. For example, call if:    · You are unable to move an arm or a leg at all.   [facility] your doctor now or seek immediate medical care if:    · You have new or worse symptoms in your arms, legs, belly, or buttocks. Symptoms may include:  ? Numbness or tingling. ? Weakness. ? Pain.     · You lose bladder or bowel control.    Watch closely for changes in your health, and be sure to contact your doctor if:    · You do not get better as expected. Where can you learn more?   Go to http://adela-mily.info/. Enter O886 in the search box to learn more about \"Neck Spasm: Care Instructions. \"  Current as of: June 26, 2019  Content Version: 12.2  © 4987-4736 [employer]. Care instructions adapted under license by [employer] (which disclaims liability or warranty for this information). If you have questions about a medical condition or this instruction, always ask your healthcare professional. [place] [address] any warranty or liability for your use of this information. Neck Spasm: Exercises  Introduction  Here are some examples of exercises for you to try. The exercises may be suggested for a condition or for rehabilitation. Start each exercise slowly. Ease off the exercises if you start to have pain. You will be told when to start these exercises and which ones will work best for you. How to do the exercises  Levator scapula stretch    1. Sit in a firm chair, or stand up straight. 2. Gently tilt your head toward your left shoulder. 3. Turn your head to look down into your armpit, bending your head slightly forward. Let the weight of your head stretch your neck muscles. 4. Hold for 15 to 30 seconds. 5. Return to your starting position. 6. Follow the same instructions above, but tilt your head toward your right shoulder. 7. Repeat 2 to 4 times toward each shoulder. Upper trapezius stretch    1. Sit in a firm chair, or stand up straight. 2. This stretch works best if you keep your shoulder down as you lean away from it. To help you remember to do this, start by relaxing your shoulders and lightly holding on to your thighs or your chair. 3. Tilt your head toward your shoulder and hold for 15 to 30 seconds. Let the weight of your head stretch your muscles. 4. If you would like a little added stretch, place your arm behind your back. Use the arm opposite of the direction you are tilting your head.  For example, if you are tilting your head to the left, place your right arm behind your back. 5. Repeat 2 to 4 times toward each shoulder. Neck rotation    1. Sit in a firm chair, or stand up straight. 2. Keeping your chin level, turn your head to the right, and hold for 15 to 30 seconds. 3. Turn your head to the left, and hold for 15 to 30 seconds. 4. Repeat 2 to 4 times to each side. Chin tuck    1. Lie on the floor with a rolled-up towel under your neck. Your head should be touching the floor. 2. Slowly bring your chin toward the front of your neck. 3. Hold for a count of 6, and then relax for up to 10 seconds. 4. Repeat 8 to 12 times. Forward neck flexion    1. Sit in a firm chair, or stand up straight. 2. Bend your head forward. 3. Hold for 15 to 30 seconds, then return to your starting position. 4. Repeat 2 to 4 times. Follow-up care is a key part of your treatment and safety. Be sure to make and go to all appointments, and call your doctor if you are having problems. It's also a good idea to know your test results and keep a list of the medicines you take. Where can you learn more? Go to http://adela-mily.info/. Enter P962 in the search box to learn more about \"Neck Spasm: Exercises. \"  Current as of: June 26, 2019  Content Version: 12.2  © 9454-5851 [employer]. Care instructions adapted under license by [employer] (which disclaims liability or warranty for this information). If you have questions about a medical condition or this instruction, always ask your healthcare professional. [place] [address] any warranty or liability for your use of this information. Back Spasm: Care Instructions  Your Care Instructions  A back spasm is sudden tightness and pain in your back muscles. It may happen from overuse or an injury. Things like sleeping in an awkward way, bending, lifting, standing, or sitting can sometimes cause a spasm.  But the cause isn't always clear. Home treatment includes using heat or ice, taking over-the-counter (OTC) pain medicines, and avoiding activities that may cause back pain. For a back spasm that doesn't get better with home care, your doctor may prescribe medicine. Treatments such as massage or manipulation may also help ease a back spasm. Your doctor may also suggest exercise or physical therapy to help improve strength and flexibility in your back muscles. In most cases, getting back to your normal activities is good for your back. Just make sure to avoid doing things that make your pain worse. Follow-up care is a key part of your treatment and safety. Be sure to make and go to all appointments, and call your doctor if you are having problems. It's also a good idea to know your test results and keep a list of the medicines you take. How can you care for yourself at home?   Heat, ice, and medicines    · To relieve pain, use heat or ice (whichever feels better) on the affected area. ? Put a warm water bottle, a heating pad set on low, or a warm cloth on your back. Put a thin cloth between the heating pad and your skin. Do not go to sleep with a heating pad on your skin. ? Try ice or a cold pack on the area for 10 to 20 minutes at a time. Put a thin cloth between the ice and your skin.     · For most back pain you can take over-the-counter pain medicine. Nonsteroidal anti-inflammatory drugs (NSAIDs) such as ibuprofen or naproxen seem to work best. But if you can't take NSAIDs you can try acetaminophen. Your doctor can prescribe stronger medicines if needed. Be safe with medicines. Read and follow all instructions on the label.    Body positions and posture    · Sit or lie in positions that are most comfortable for you and that reduce pain. Try one of these positions when you lie down:  ? Lie on your back with your knees bent and supported by large pillows.   ? Lie on the floor with your legs on the seat of a sofa or chair.  ? Lie on your side with your knees and hips bent and a pillow between your legs. ? Lie on your stomach if it does not make pain worse.     · Do not sit up in bed. Avoid soft couches and twisted positions.     · Avoid bed rest after the first day of back pain. Bed rest can help relieve pain at first, but it delays healing. Continued rest without activity is usually not good for your back.     · If you must sit for long periods of time, take breaks from sitting. Change positions every 30 minutes. Get up and walk around, or lie in a comfortable position. Activity    · Take short walks several times a day. You can start with 5 to 10 minutes, 3 or 4 times a day, and work up to longer walks. Walk on level surfaces and avoid hills and stairs until your back starts to feel better.     · After your back spasm starts to feel better, try to stretch your muscles every day, especially before and after exercise and at bedtime. Regular stretching can help relax your muscles.     · To prevent future back pain, do exercises to stretch and strengthen your back and stomach. Learn to use good posture, safe lifting techniques, and other ways to move to help you avoid back pain. When should you call for help? Call 911 anytime you think you may need emergency care. For example, call if:    · You are unable to move an arm or a leg at all.   [facility] your doctor now or seek immediate medical care if:    · You have new or worse symptoms in your legs, belly, or buttocks. Symptoms may include:  ? Numbness or tingling. ? Weakness. ? Pain.     · You lose bladder or bowel control.    Watch closely for changes in your health, and be sure to contact your doctor if:    · You have a fever, lose weight, or don't feel well.     · You do not get better as expected. Where can you learn more? Go to http://adela-mily.info/. Enter E232 in the search box to learn more about \"Back Spasm: Care Instructions. \"  Current as of: June 26, 2019  Content Version: 12.2  © 2435-0514 [employer]. Care instructions adapted under license by [employer] (which disclaims liability or warranty for this information). If you have questions about a medical condition or this instruction, always ask your healthcare professional. [address] any warranty or liability for your use of this information.

## 2019-11-23 LAB
ALBUMIN SERPL-MCNC: 4.2 G/DL (ref 3.6–4.8)
ALBUMIN/GLOB SERPL: 1.4 {RATIO} (ref 1.2–2.2)
ALP SERPL-CCNC: 86 IU/L (ref 39–117)
ALT SERPL-CCNC: 18 IU/L (ref 0–44)
AST SERPL-CCNC: 19 IU/L (ref 0–40)
BILIRUB SERPL-MCNC: 0.5 MG/DL (ref 0–1.2)
BUN SERPL-MCNC: 13 MG/DL (ref 8–27)
BUN/CREAT SERPL: 11 (ref 10–24)
CALCIUM SERPL-MCNC: 9.3 MG/DL (ref 8.6–10.2)
CHLORIDE SERPL-SCNC: 102 MMOL/L (ref 96–106)
CHOLEST SERPL-MCNC: 152 MG/DL (ref 100–199)
CO2 SERPL-SCNC: 24 MMOL/L (ref 20–29)
CREAT SERPL-MCNC: 1.14 MG/DL (ref 0.76–1.27)
ERYTHROCYTE [DISTWIDTH] IN BLOOD BY AUTOMATED COUNT: 13.3 % (ref 12.3–15.4)
GLOBULIN SER CALC-MCNC: 3 G/DL (ref 1.5–4.5)
GLUCOSE SERPL-MCNC: 104 MG/DL (ref 65–99)
HCT VFR BLD AUTO: 39.6 % (ref 37.5–51)
HDLC SERPL-MCNC: 48 MG/DL
HGB BLD-MCNC: 14.2 G/DL (ref 13–17.7)
INTERPRETATION, 910389: NORMAL
LDLC SERPL CALC-MCNC: 93 MG/DL (ref 0–99)
MCH RBC QN AUTO: 31.5 PG (ref 26.6–33)
MCHC RBC AUTO-ENTMCNC: 35.9 G/DL (ref 31.5–35.7)
MCV RBC AUTO: 88 FL (ref 79–97)
PLATELET # BLD AUTO: 204 X10E3/UL (ref 150–450)
POTASSIUM SERPL-SCNC: 4 MMOL/L (ref 3.5–5.2)
PROT SERPL-MCNC: 7.2 G/DL (ref 6–8.5)
RBC # BLD AUTO: 4.51 X10E6/UL (ref 4.14–5.8)
SODIUM SERPL-SCNC: 140 MMOL/L (ref 134–144)
TRIGL SERPL-MCNC: 57 MG/DL (ref 0–149)
VLDLC SERPL CALC-MCNC: 11 MG/DL (ref 5–40)
WBC # BLD AUTO: 5 X10E3/UL (ref 3.4–10.8)

## 2019-11-29 ENCOUNTER — OFFICE VISIT (OUTPATIENT)
Dept: FAMILY MEDICINE CLINIC | Age: 59
End: 2019-11-29

## 2019-11-29 VITALS
OXYGEN SATURATION: 99 % | HEIGHT: 74 IN | HEART RATE: 85 BPM | WEIGHT: 245 LBS | SYSTOLIC BLOOD PRESSURE: 155 MMHG | BODY MASS INDEX: 31.44 KG/M2 | TEMPERATURE: 97.4 F | RESPIRATION RATE: 15 BRPM | DIASTOLIC BLOOD PRESSURE: 80 MMHG

## 2019-11-29 DIAGNOSIS — I10 ESSENTIAL HYPERTENSION WITH GOAL BLOOD PRESSURE LESS THAN 130/80: Primary | ICD-10-CM

## 2019-11-29 DIAGNOSIS — E78.5 HYPERLIPIDEMIA, UNSPECIFIED HYPERLIPIDEMIA TYPE: ICD-10-CM

## 2019-11-29 DIAGNOSIS — G89.4 CHRONIC PAIN SYNDROME: ICD-10-CM

## 2019-11-29 DIAGNOSIS — L30.9 ECZEMA, UNSPECIFIED TYPE: ICD-10-CM

## 2019-11-29 RX ORDER — TRIAMCINOLONE ACETONIDE 5 MG/G
OINTMENT TOPICAL 2 TIMES DAILY
Qty: 30 G | Refills: 0 | Status: SHIPPED | OUTPATIENT
Start: 2019-11-29 | End: 2020-08-20

## 2019-11-29 RX ORDER — AMLODIPINE BESYLATE 10 MG/1
10 TABLET ORAL DAILY
Qty: 90 TAB | Refills: 2 | Status: SHIPPED | OUTPATIENT
Start: 2019-11-29 | End: 2020-03-23 | Stop reason: SDUPTHER

## 2019-11-29 RX ORDER — ROSUVASTATIN CALCIUM 10 MG/1
10 TABLET, COATED ORAL
Qty: 90 TAB | Refills: 2 | Status: SHIPPED | OUTPATIENT
Start: 2019-11-29 | End: 2019-12-31 | Stop reason: SDUPTHER

## 2019-11-29 NOTE — PROGRESS NOTES
Kaylin Bocanegra    CC: Follow-up for chronic disease management    HPI:     HTN:  -Got requested lab work  -Taking BP medication as prescribed. -Denies any issues or side effects with BP medication.  -Has not been checking BP at home  -Diet is unrestricted  -Has not been following a regular exercise regimen        Chronic Pain:  -Taking Zanaflex as prescribed  -Denies any side effects or issues with the medication  -Reports medication works well       HLD:  -Got requested lab work  -Taking statin as prescribed  -Denies any side effects or issues with statin  -Does not follow a regular exercise regimen  -Diet is unrestricted    Eczema:  -Reports current eczema flare on forearms  -Taking triamcinolone as prescribed for issue  -Reports medication has not resolved issue      ROS: Positive items marked in RED  CON: fever, chills  Cardiovascular: palpitations, CP  Resp: SOB, cough  GI: nausea, vomiting, diarrhea  : dysuria, hematuria      Past Medical History:   Diagnosis Date    Arthritis     established with Dr. 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 Joseluis Mccabe HIV (human immunodeficiency virus infection) [facility]) dx Aleena Toribio Ms.  Tows; viral loads undetectable for 13 yrs    Hypercholesterolemia     Hypertension     Obese        Past Surgical History:   Procedure Laterality Date    COLONOSCOPY N/A 8/12/2016    COL[facility]ail [facility] at [facility] HX COLONOSCOPY      2011    HX HEENT      eye surg for glaucoma       Family History   Family history unknown: Yes       Social History     Socioeconomic History    Marital status: SINGLE     Spouse name: Not on file    Number of children: Not on file    Years of education: Not on file    Highest education level: Not on file   Tobacco Use    Smoking status: Former Smoker Last attempt to quit: 2006     Years since quittin.4    Smokeless tobacco: Never Used    Tobacco comment: smoked for 30 years: 1/2 ppd. Substance and Sexual Activity    Alcohol use: No     Alcohol/week: 0.0 standard drinks    Drug use: No     Comment: prior marijuana, crack. -approx 20 years ago    Sexual activity: Never   Social History Narrative    Previously worked as a [employer] at [employer]       No Known Allergies      Current Outpatient Medications:     tiZANidine (ZANAFLEX) 4 mg tablet, TAKE 1 TABLET THREE TIMES DAILY AS NEEDED FOR MUSCLE SPASM, Disp: 90 Tab, Rfl: 1    cyanocobalamin (VITAMIN B-12) 1,000 mcg tablet, Take 1,000 mcg by mouth daily. , Disp: , Rfl:     cinnamon bark (CINNAMON) 500 mg cap, Take  by mouth., Disp: , Rfl:     meclizine (ANTIVERT) 25 mg tablet, Take 25 mg by mouth three (3) times daily as needed. , Disp: , Rfl:     amLODIPine (NORVASC) 5 mg tablet, Take 1 Tab by mouth daily. , Disp: 30 Tab, Rfl: 3    therapeutic multivitamin (THEREMS) tablet, Take 1 Tab by mouth., Disp: , Rfl:     diclofenac (VOLTAREN) 1 % gel, Apply  to affected area four (4) times daily. , Disp: 100 g, Rfl: 1    cttacveya-wygudznf-oqxlsnc ala (BIKTARVY) tab tablet, Take 1 Tab by mouth daily. , Disp: , Rfl:     rosuvastatin (CRESTOR) 5 mg tablet, TAKE 1 TABLET EVERY NIGHT, Disp: 90 Tab, Rfl: 3    mometasone (NASONEX) 50 mcg/actuation nasal spray, 2 Sprays by Both Nostrils route daily. , Disp: 3 Container, Rfl: 0    timolol (TIMOPTIC) 0.5 % ophthalmic solution, Administer 1 Drop to both eyes two (2) times a day., Disp: , Rfl:     brimonidine (ALPHAGAN P) 0.1 % ophthalmic solution, every eight (8) hours. , Disp: , Rfl:     triamcinolone acetonide 0.025 % lotion, Apply  to affected area two (2) times a day., Disp: , Rfl:     omega-3 fatty acids-vitamin e 1,000 mg cap, Take 1 Cap by mouth., Disp: , Rfl:     multivitamin (ONE A DAY) tablet, Take 1 Tab by mouth daily. , Disp: , Rfl:     ascorbic acid (VITAMIN C) 500 mg tablet, Take 500 mg by mouth daily. , Disp: , Rfl:     Physical Exam:      /80   Pulse 85   Temp 97.4 °F (36.3 °C) (Oral)   Resp 15   Ht 6' 2\" (1.88 m)   Wt 245 lb (111.1 kg)   SpO2 99%   BMI 31.46 kg/m²     General: Obese habitus, NAD, conversant  Eyes: sclera clear bilaterally, no discharge noted, eyelids normal in appearance  HENT: NCAT  Lungs: CTAB, normal respiratory effort and rate  CV: RRR, no MRGs  ABD: soft, non-tender, non-distended, normal bowel sounds  Skin: normal temperature and turgor. Forearms with patches of hyperpigmented and rough skin. Psych: alert and oriented to person, place and situation, normal affect  Neuro: speech normal, moving all extremities, gait normal    Results for Erik Habermann (MRN [id number]):   Ref.  Range 11/22/2019 09:25   WBC Latest Ref Range: 3.4 - 10.8 x10E3/uL 5.0   RBC Latest Ref Range: 4.14 - 5.80 x10E6/uL 4.51   HGB Latest Ref Range: 13.0 - 17.7 g/dL 14.2   HCT Latest Ref Range: 37.5 - 51.0 % 39.6   MCV Latest Ref Range: 79 - 97 fL 88   MCH Latest Ref Range: 26.6 - 33.0 pg 31.5   MCHC Latest Ref Range: 31.5 - 35.7 g/dL 35.9 (H)   RDW Latest Ref Range: 12.3 - 15.4 % 13.3   PLATELET Latest Ref Range: 150 - 450 x10E3/uL 204   Sodium Latest Ref Range: 134 - 144 mmol/L 140   Potassium Latest Ref Range: 3.5 - 5.2 mmol/L 4.0   Chloride Latest Ref Range: 96 - 106 mmol/L 102   CO2 Latest Ref Range: 20 - 29 mmol/L 24   Glucose Latest Ref Range: 65 - 99 mg/dL 104 (H)   BUN Latest Ref Range: 8 - 27 mg/dL 13   Creatinine Latest Ref Range: 0.76 - 1.27 mg/dL 1.14   BUN/Creatinine ratio Latest Ref Range: 10 - 24  11   Calcium Latest Ref Range: 8.6 - 10.2 mg/dL 9.3   GFR est non-AA Latest Ref Range: >59 mL/min/1.73 69   GFR est AA Latest Ref Range: >59 mL/min/1.73 80   Bilirubin, total Latest Ref Range: 0.0 - 1.2 mg/dL 0.5   Protein, total Latest Ref Range: 6.0 - 8.5 g/dL 7.2   Albumin Latest Ref Range: 3.6 - 4.8 g/dL 4.2   A-G Ratio Latest Ref Range: 1.2 - 2.2  1.4   ALT (SGPT) Latest Ref Range: 0 - 44 IU/L 18   AST Latest Ref Range: 0 - 40 IU/L 19   Alk.  phosphatase Latest Ref Range: 39 - 117 IU/L 86   Triglyceride Latest Ref Range: 0 - 149 mg/dL 57   Cholesterol, total Latest Ref Range: 100 - 199 mg/dL 152   HDL Cholesterol Latest Ref Range: >39 mg/dL 48   LDL, calculated Latest Ref Range: 0 - 99 mg/dL 93   VLDL, calculated Latest Ref Range: 5 - 40 mg/dL 11       Assessment/Plan     Hypertension, inadequately controlled:  -BP not at goal of less than 130/80  -Amlodipine dose increased to 10 mg daily  -Follow-up in 6 months      HLD:  -10-year ASCVD risk of 18.1%  -Patient counseled on ASCVD risk and AHA/ACC recommendations  -Crestor dose increased to 10 mg, per patient's preference  -Follow-up in 6 months       Chronic Pain, well controlled:  -Will continue current tizanidine regimen  -Follow-up in 6 months      Eczema:  -Dose of triamcinolone increased to 0.5%  -Follow-up in 6 months        Pallavi Canales MD  11/29/2019, 9:43 AM

## 2019-12-31 DIAGNOSIS — I10 ESSENTIAL HYPERTENSION WITH GOAL BLOOD PRESSURE LESS THAN 130/80: ICD-10-CM

## 2019-12-31 DIAGNOSIS — E78.5 HYPERLIPIDEMIA, UNSPECIFIED HYPERLIPIDEMIA TYPE: ICD-10-CM

## 2019-12-31 RX ORDER — MOMETASONE FUROATE 50 UG/1
2 SPRAY, METERED NASAL DAILY
Qty: 3 CONTAINER | Refills: 0 | Status: CANCELLED | OUTPATIENT
Start: 2019-12-31

## 2019-12-31 RX ORDER — AMLODIPINE BESYLATE 10 MG/1
10 TABLET ORAL DAILY
Qty: 90 TAB | Refills: 2 | Status: CANCELLED | OUTPATIENT
Start: 2019-12-31

## 2020-01-10 ENCOUNTER — OFFICE VISIT (OUTPATIENT)
Dept: FAMILY MEDICINE CLINIC | Age: 60
End: 2020-01-10

## 2020-01-10 VITALS
WEIGHT: 244 LBS | SYSTOLIC BLOOD PRESSURE: 135 MMHG | HEART RATE: 72 BPM | BODY MASS INDEX: 31.32 KG/M2 | RESPIRATION RATE: 16 BRPM | TEMPERATURE: 97 F | OXYGEN SATURATION: 100 % | HEIGHT: 74 IN | DIASTOLIC BLOOD PRESSURE: 79 MMHG

## 2020-01-10 DIAGNOSIS — L30.9 ECZEMA, UNSPECIFIED TYPE: ICD-10-CM

## 2020-01-10 DIAGNOSIS — I10 ESSENTIAL HYPERTENSION WITH GOAL BLOOD PRESSURE LESS THAN 130/80: Primary | ICD-10-CM

## 2020-01-10 DIAGNOSIS — J30.9 ALLERGIC RHINITIS, UNSPECIFIED SEASONALITY, UNSPECIFIED TRIGGER: ICD-10-CM

## 2020-01-10 DIAGNOSIS — E78.5 HYPERLIPIDEMIA, UNSPECIFIED HYPERLIPIDEMIA TYPE: ICD-10-CM

## 2020-01-10 RX ORDER — MONTELUKAST SODIUM 10 MG/1
10 TABLET ORAL DAILY
COMMUNITY
End: 2020-01-10 | Stop reason: SDUPTHER

## 2020-01-10 RX ORDER — MONTELUKAST SODIUM 10 MG/1
10 TABLET ORAL DAILY
Qty: 90 TAB | Refills: 1 | Status: SHIPPED | OUTPATIENT
Start: 2020-01-10 | End: 2020-06-05

## 2020-01-10 RX ORDER — MINERAL OIL
180 ENEMA (ML) RECTAL DAILY
Qty: 90 TAB | Refills: 1 | Status: SHIPPED | OUTPATIENT
Start: 2020-01-10 | End: 2020-02-19

## 2020-01-10 RX ORDER — MOMETASONE FUROATE 50 UG/1
2 SPRAY, METERED NASAL DAILY
Qty: 3 CONTAINER | Refills: 0 | Status: SHIPPED | OUTPATIENT
Start: 2020-01-10 | End: 2020-02-07 | Stop reason: RX

## 2020-01-10 NOTE — PROGRESS NOTES
1. Have you been to the ER, urgent care clinic since your last visit? Hospitalized since your last visit? No    2. Have you seen or consulted any other health care providers outside of the [address] [place] [address] since your last visit? Include any pap smears or colon screening.  No

## 2020-01-10 NOTE — PROGRESS NOTES
[facility]    CC: Follow-up for chronic disease management    HPI:     HTN:  -Taking BP medication as prescribed. -Denies any issues or side effects with BP medication.  -Has not been checking BP at home  -Has reduced starch in his diet  -Exercises 3 days a week for ~2 hours       HLD:  -Taking higher dose of statin as prescribed. -Denies any issues or side effects with medication.  -Has reduced starch in his diet  -Exercises 3 days a week for ~2 hours      Eczema:  -Using triamcinolone ointment as need for issue  -Denies any issues or side effects with the ointment  -Reports medication works well      Allergic Rhinitis:  -Year round issue  -Reports known trigger of pollen  -Taking montelukast and Nasonex for issue  -Currently has symptoms       ROS: Positive items marked in RED  CON: fever, chills  Cardiovascular: palpitations, CP  Resp: SOB, cough  GI: nausea, vomiting, diarrhea  : dysuria, hematuria      Past Medical History:   Diagnosis Date    Arthritis     established with Dr. Josephine Julien chiropractor    Autoimmune disease ([facility] [address]     Blindness     left eye; [facility] Ward [facility]ilar HIV (human immunodeficiency virus infection) [facility]) dx Cassie Markie Ms.  Tows; viral loads undetectable for 13 yrs    Hypercholesterolemia     Hypertension     Obese        Past Surgical History:   Procedure Laterality Date    COLONOSCOPY N/A 8/12/2016    COLON[facility] performed [facility] Vimal Berger [facility] at [facility] HX COLONOSCOPY      2011    HX HEENT      eye surg for glaucoma       Family History   Family history unknown: Yes       Social History     Socioeconomic History    Marital status: SINGLE     Spouse name: Not on file    Number of children: Not on file    Years of education: Not on file    Highest education level: Not on file   Tobacco Use    Smoking status: Former Smoker     Last attempt to quit: 2006     Years since quittin.5    Smokeless tobacco: Never Used    Tobacco comment: smoked for 30 years: 1/2 ppd. Substance and Sexual Activity    Alcohol use: No     Alcohol/week: 0.0 standard drinks    Drug use: No     Comment: prior marijuana, crack. -approx 20 years ago    Sexual activity: Never   Social History Narrative    Previously worked as a  at [employer]       No Known Allergies      Current Outpatient Medications:     montelukast (SINGULAIR) 10 mg tablet, Take 10 mg by mouth daily. , Disp: , Rfl:     triamcinolone acetonide (KENALOG) 0.5 % ointment, Apply  to affected area two (2) times a day. use thin layer, Disp: 30 g, Rfl: 0    amLODIPine (NORVASC) 10 mg tablet, Take 1 Tab by mouth daily. , Disp: 90 Tab, Rfl: 2    rosuvastatin (CRESTOR) 10 mg tablet, Take 1 Tab by mouth nightly., Disp: 90 Tab, Rfl: 2    cyanocobalamin (VITAMIN B-12) 1,000 mcg tablet, Take 1,000 mcg by mouth daily. , Disp: , Rfl:     cinnamon bark (CINNAMON) 500 mg cap, Take  by mouth., Disp: , Rfl:     xywhzxvty-kmisonps-yybboam ala (BIKTARVY) tab tablet, Take 1 Tab by mouth daily. , Disp: , Rfl:     timolol (TIMOPTIC) 0.5 % ophthalmic solution, Administer 1 Drop to both eyes two (2) times a day., Disp: , Rfl:     omega-3 fatty acids-vitamin e 1,000 mg cap, Take 1 Cap by mouth., Disp: , Rfl:     multivitamin (ONE A DAY) tablet, Take 1 Tab by mouth daily. , Disp: , Rfl:     ascorbic acid (VITAMIN C) 500 mg tablet, Take 500 mg by mouth daily. , Disp: , Rfl:     tiZANidine (ZANAFLEX) 4 mg tablet, TAKE 1 TABLET THREE TIMES DAILY AS NEEDED FOR MUSCLE SPASM, Disp: 90 Tab, Rfl: 1    therapeutic multivitamin (THEREMS) tablet, Take 1 Tab by mouth., Disp: , Rfl:     mometasone (NASONEX) 50 mcg/actuation nasal spray, 2 Sprays by Both Nostrils route daily. , Disp: 3 Container, Rfl: 0    Physical Exam:      /79   Pulse 72   Temp 97 °F (36.1 °C) (Oral)   Resp 16   Ht 6' 2\" (1.88 m)   Wt 244 lb (110.7 kg)   SpO2 100%   BMI 31.33 kg/m²     General: Obese habitus, NAD, conversant  Eyes: sclera clear bilaterally, no discharge noted, eyelids normal in appearance  HENT: NCAT, bilateral preauricular sinus, nasal turbinates enlarged bilaterally  Lungs: CTAB, normal respiratory effort and rate  CV: RRR, no MRGs  ABD: soft, non-tender, non-distended, normal bowel sounds  Skin: normal temperature, turgor, color, and texture  Psych: alert and oriented to person, place and situation, normal affect  Neuro: speech normal, moving all extremities, gait normal      Assessment/Plan     Hypertension, improving:  -BP near goal of less than 130/80  -Patient would like to work on recommended lifestyle changes, before doing any adjustments to his BP medication  -Will continue current BP medication regimen  -Follow-up in 1 month for HTN      Allergic Rhinitis:  -Reorder prior Singulair and Nasonex regimen  -Started on Allegra regimen  -Allergen profile ordered  -Follow-up in 1 month for HTN        HLD:  -Last 10-year ASCVD risk calculated to be 18.1%  -Will continue current statin therapy  -Follow-up in 1 month for HTN        Eczema:  -Will continue current triamcinolone regimen  -Follow-up in 1 month for HTN        Radha Velze MD  1/10/2020, 9:38 AM

## 2020-01-12 LAB
A ALTERNATA IGE QN: <0.1 KU/L
A FUMIGATUS IGE QN: <0.1 KU/L
AMER ROACH IGE QN: <0.1 KU/L
BAHIA GRASS IGE QN: <0.1 KU/L
BERMUDA GRASS IGE QN: <0.1 KU/L
BOXELDER IGE QN: <0.1 KU/L
C HERBARUM IGE QN: <0.1 KU/L
CAT DANDER IGE QN: <0.1 KU/L
CMN PIGWEED IGE QN: <0.1 KU/L
COMMON RAGWEED IGE QN: <0.1 KU/L
D FARINAE IGE QN: <0.1 KU/L
D PTERONYSS IGE QN: <0.1 KU/L
DOG DANDER IGE QN: <0.1 KU/L
ENGL PLANTAIN IGE QN: <0.1 KU/L
JOHNSON GRASS IGE QN: <0.1 KU/L
LONDON PLANE IGE QN: <0.1 KU/L
Lab: NORMAL
M RACEMOSUS IGE QN: <0.1 KU/L
MT JUNIPER IGE QN: <0.1 KU/L
MUGWORT IGE QN: <0.1 KU/L
NETTLE IGE QN: <0.1 KU/L
P NOTATUM IGE QN: <0.1 KU/L
S BOTRYOSUM IGE QN: <0.1 KU/L
SHEEP SORREL IGE QN: <0.1 KU/L
SILVER BIRCH IGE QN: <0.1 KU/L
SWEET GUM IGE QN: <0.1 KU/L
TIMOTHY IGE QN: <0.1 KU/L
WHITE ELM IGE QN: <0.1 KU/L
WHITE HICKORY IGE QN: <0.1 KU/L
WHITE MULBERRY IGE QN: <0.1 KU/L
WHITE OAK IGE QN: <0.1 KU/L

## 2020-02-07 DIAGNOSIS — J30.9 ALLERGIC RHINITIS, UNSPECIFIED SEASONALITY, UNSPECIFIED TRIGGER: Primary | ICD-10-CM

## 2020-02-07 RX ORDER — FLUTICASONE PROPIONATE 50 MCG
2 SPRAY, SUSPENSION (ML) NASAL DAILY
Qty: 1 BOTTLE | Refills: 2 | Status: SHIPPED | OUTPATIENT
Start: 2020-02-07 | End: 2020-05-19

## 2020-02-19 ENCOUNTER — OFFICE VISIT (OUTPATIENT)
Dept: FAMILY MEDICINE CLINIC | Age: 60
End: 2020-02-19

## 2020-02-19 VITALS
OXYGEN SATURATION: 100 % | RESPIRATION RATE: 16 BRPM | WEIGHT: 237 LBS | DIASTOLIC BLOOD PRESSURE: 68 MMHG | HEART RATE: 81 BPM | HEIGHT: 73 IN | SYSTOLIC BLOOD PRESSURE: 127 MMHG | TEMPERATURE: 97.3 F | BODY MASS INDEX: 31.41 KG/M2

## 2020-02-19 DIAGNOSIS — M62.838 MUSCLE SPASM: ICD-10-CM

## 2020-02-19 RX ORDER — TIZANIDINE 4 MG/1
TABLET ORAL
Qty: 90 TAB | Refills: 1 | Status: SHIPPED | OUTPATIENT
Start: 2020-02-19 | End: 2020-04-12

## 2020-02-19 RX ORDER — METHOCARBAMOL 750 MG/1
750 TABLET, FILM COATED ORAL 4 TIMES DAILY
Qty: 120 TAB | Refills: 1 | Status: CANCELLED | OUTPATIENT
Start: 2020-02-19
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
## 2020-02-19 NOTE — PROGRESS NOTES
[facility]    CC: F/U for HTN    HPI:     HTN:  -Taking BP medication as prescribed. -Denies any issues or side effects with BP medication.  -Has not been checking BP at home  -Has cut carbs, sugars, red meat, and fatty foods out of his diet  -Exercises 3 days a week for ~2 hours       ROS: Positive items marked in RED  CON: fever, chills  Cardiovascular: palpitations, CP  Resp: SOB, cough  GI: nausea, vomiting, diarrhea  : dysuria, hematuria      Past Medical History:   Diagnosis Date    Arthritis     established with Dr. Kelsi Coronel chiropractor    Autoimmune disease ([facility] [address]     Blindness     left [facility]; [facility] Ella Beasley HIV (human immunodeficiency virus infection) [facility]) dx Marylene Hai Ms. Ramos; viral loads undetectable for 13 yrs    Hypercholesterolemia     Hypertension     Obese     Preauricular sinus        Past Surgical History:   Procedure Laterality Date    COLONOSCOPY N/A 2016    [facility] HX COLONOSCOPY          HX HEENT      eye surg for glaucoma       Family History   Family history unknown: Yes       Social History     Socioeconomic History    Marital status: SINGLE     Spouse name: Not on file    Number of children: Not on file    Years of education: Not on file    Highest education level: Not on file   Tobacco Use    Smoking status: Former Smoker     Last attempt to quit: 2006     Years since quittin.6    Smokeless tobacco: Never Used    Tobacco comment: smoked for 30 years: 1/2 ppd. Substance and Sexual Activity    Alcohol use: No     Alcohol/week: 0.0 standard drinks    Drug use: No     Comment: prior marijuana, crack.  -approx 20 years ago    Sexual activity: Never   Social History Narrative    Previously worked as a [employer] at [employer]       No [address] Medications:     fluticasone propionate (FLONASE) 50 mcg/actuation nasal spray, 2 Sprays by Both Nostrils route daily. , Disp: 1 Bottle, Rfl: 2    amLODIPine (NORVASC) 10 mg tablet, Take 1 Tab by mouth daily. , Disp: 90 Tab, Rfl: 2    rosuvastatin (CRESTOR) 10 mg tablet, Take 1 Tab by mouth nightly., Disp: 90 Tab, Rfl: 2    cyanocobalamin (VITAMIN B-12) 1,000 mcg tablet, Take 1,000 mcg by mouth daily. , Disp: , Rfl:     cinnamon bark (CINNAMON) 500 mg cap, Take  by mouth., Disp: , Rfl:     clquebkaa-bdrdfinb-zkjyacz ala (BIKTARVY) tab tablet, Take 1 Tab by mouth daily. , Disp: , Rfl:     timolol (TIMOPTIC) 0.5 % ophthalmic solution, Administer 1 Drop to both eyes two (2) times a day., Disp: , Rfl:     omega-3 fatty acids-vitamin e 1,000 mg cap, Take 1 Cap by mouth., Disp: , Rfl:     multivitamin (ONE A DAY) tablet, Take 1 Tab by mouth daily. , Disp: , Rfl:     ascorbic acid (VITAMIN C) 500 mg tablet, Take 500 mg by mouth daily. , Disp: , Rfl:     methocarbamol (ROBAXIN) 750 mg tablet, Take 1 Tab by mouth four (4) times daily. , Disp: 16 Tab, Rfl: 0    montelukast (SINGULAIR) 10 mg tablet, Take 1 Tab by mouth daily. , Disp: 90 Tab, Rfl: 1    fexofenadine (ALLEGRA) 180 mg tablet, Take 1 Tab by mouth daily. , Disp: 90 Tab, Rfl: 1    triamcinolone acetonide (KENALOG) 0.5 % ointment, Apply  to affected area two (2) times a day.  use thin layer, Disp: 30 g, Rfl: 0    tiZANidine (ZANAFLEX) 4 mg tablet, TAKE 1 TABLET THREE TIMES DAILY AS NEEDED FOR MUSCLE SPASM, Disp: 90 Tab, Rfl: 1    therapeutic multivitamin (THEREMS) tablet, Take 1 Tab by mouth., Disp: , Rfl:     Physical Exam:      /68   Pulse 81   Temp 97.3 °F (36.3 °C) (Oral)   Resp 16   Ht 6' 1\" (1.854 m)   Wt 237 lb (107.5 kg)   SpO2 100%   BMI 31.27 kg/m²     General: obese habitus, NAD, conversant  Eyes: sclera clear bilaterally, no discharge noted, eyelids normal in appearance  HENT: NCAT  Lungs: CTAB, normal respiratory effort and rate  CV: RRR, no MRGs  ABD: soft, non-tender, non-distended, normal bowel sounds  Skin: normal temperature, turgor, color, and texture  Psych: alert and oriented to person, place and situation, normal affect  Neuro: speech normal, moving all extremities, gait normal      Assessment/Plan     Hypertension:  -BP at goal of less than 130/80  -Will continue current BP medication regimen  -Follow-up in 1 month for Medicare wellness visit        Onelia Cerna MD  2/19/2020, 10:12 AM

## 2020-03-23 DIAGNOSIS — I10 ESSENTIAL HYPERTENSION WITH GOAL BLOOD PRESSURE LESS THAN 130/80: ICD-10-CM

## 2020-03-23 NOTE — TELEPHONE ENCOUNTER
Patient has not had amlodipine since 03/20, refill in system. Pharmacy  would not issue an emergency as [facility] has never filled this prescription. Patient is requesting an emergeny supply until his mail order arrives. Call the patient back to advise.

## 2020-03-24 RX ORDER — AMLODIPINE BESYLATE 10 MG/1
10 TABLET ORAL DAILY
Qty: 90 TAB | Refills: 2 | Status: SHIPPED | OUTPATIENT
Start: 2020-03-24 | End: 2020-08-20

## 2020-03-30 ENCOUNTER — TELEPHONE (OUTPATIENT)
Dept: PRIMARY CARE CLINIC | Age: 60
End: 2020-03-30

## 2020-04-01 ENCOUNTER — DOCUMENTATION ONLY (OUTPATIENT)
Dept: FAMILY MEDICINE CLINIC | Age: 60
End: 2020-04-01

## 2020-04-01 ENCOUNTER — VIRTUAL VISIT (OUTPATIENT)
Dept: FAMILY MEDICINE CLINIC | Age: 60
End: 2020-04-01

## 2020-04-01 DIAGNOSIS — Z12.11 COLON CANCER SCREENING: ICD-10-CM

## 2020-04-01 DIAGNOSIS — Z71.89 ADVANCED CARE PLANNING/COUNSELING DISCUSSION: ICD-10-CM

## 2020-04-01 DIAGNOSIS — Z00.00 INITIAL MEDICARE ANNUAL WELLNESS VISIT: Primary | ICD-10-CM

## 2020-04-01 NOTE — PROGRESS NOTES
1. Have you been to the ER, urgent care clinic since your last visit? Hospitalized since your last visit? No    2. Have you seen or consulted any other health care providers outside of the [address] [place] [address] since your last visit? Include any pap smears or colon screening.  Yes When: 3 days ago Dr. Jes Brabosa Ophthmalogy

## 2020-04-01 NOTE — PATIENT INSTRUCTIONS
Medicare Wellness Visit, Male The best way to live healthy is to have a lifestyle where you eat a well-balanced diet, exercise regularly, limit alcohol use, and quit all forms of tobacco/nicotine, if applicable. Regular preventive services are another way to keep healthy. Preventive services (vaccines, screening tests, monitoring & exams) can help personalize your care plan, which helps you manage your own care. Screening tests can find health problems at the earliest stages, when they are easiest to treat. [facility] follows the current, evidence-based guidelines published by the [facility] ([facility]) when recommending preventive services for our patients. Because we follow these guidelines, sometimes recommendations change over time as research supports it. (For example, a prostate screening blood test is no longer routinely recommended for men with no symptoms). Of course, you and your doctor may decide to screen more often for some diseases, based on your risk and co-morbidities (chronic disease you are already diagnosed with). Preventive services for you include: - Medicare offers their members a free annual wellness visit, which is time for you and your primary care provider to discuss and plan for your preventive service needs. Take advantage of this benefit every year! 
-All adults over age 72 should receive the recommended pneumonia vaccines. Current USPSTF guidelines recommend a series of two vaccines for the best pneumonia protection.  
-All adults should have a flu vaccine yearly and tetanus vaccine every 10 years. 
-All adults age 48 and older should receive the shingles vaccines (series of two vaccines).       
-All adults age 38-68 who are overweight should have a diabetes screening test once every three years.  
-Other screening tests & preventive services for persons with diabetes include: an eye exam to screen for diabetic retinopathy, a kidney function test, a foot exam, and stricter control over your cholesterol.  
-Cardiovascular screening for adults with routine risk involves an electrocardiogram (ECG) at intervals determined by the provider.  
-Colorectal cancer screening should be done for adults age 54-65 with no increased risk factors for colorectal cancer. There are a number of acceptable methods of screening for this type of cancer. Each test has its own benefits and drawbacks. Discuss with your provider what is most appropriate for you during your annual wellness visit. The different tests include: colonoscopy (considered the best screening method), a fecal occult blood test, a fecal DNA test, and sigmoidoscopy. 
-All adults born between [facility] should be screened once for Hepatitis C. 
-An Abdominal Aortic Aneurysm (AAA) Screening is recommended for men age 73-68 who has ever smoked in their lifetime. Here is a list of your current Health Maintenance items (your personalized list of preventive services) with a due date: 
Health Maintenance Due Topic Date Due  
 Annual Well Visit  05/24/2018  Colonoscopy  08/12/2019

## 2020-04-01 NOTE — ACP (ADVANCE CARE PLANNING)
Advance Care Planning (ACP) Provider Note - Comprehensive     Date of ACP Conversation: 04/01/20  Persons included in Conversation:  patient  Length of ACP Conversation in minutes:  <16 minutes (Non-Billable)    Authorized Decision Maker (if patient is incapable of making informed decisions):    This person is:  Jhoanrody Princess Sammi Joanne         General ACP for ALL Patients with Decision Making Capacity:   Importance of advance care planning, including choosing a healthcare agent to communicate patient's healthcare decisions if patient lost the ability to make decisions, such as after a sudden illness or accident    Review of Existing Advance Directive:  N/A    For Serious or Chronic Illness:  No known illness    Interventions Provided:  Recommended completion of Advance Directive form after review of ACP materials and conversation with prospective healthcare agent

## 2020-04-01 NOTE — PROGRESS NOTES
Bud Sequeira is a 62 y.o. male who was seen by synchronous (real-time) audio-video technology on 4/1/2020. Consent:  He and/or his healthcare decision maker is aware that this patient-initiated Telehealth encounter is a billable service, with coverage as determined by his insurance carrier. He is aware that he may receive a bill and has provided verbal consent to proceed: Yes    I was in the office while conducting this encounter. This is an Initial Medicare Annual Wellness Exam (AWV) (Performed 12 months after IPPE or effective date of Medicare Part B enrollment, Once in a lifetime)    I have reviewed the patient's medical history in detail and updated the computerized patient record. History     Patient Active Problem List   Diagnosis Code    Essential hypertension I10    HLD (hyperlipidemia) E78.5    HIV (human immunodeficiency virus infection) ([facility] [address] B20    Glaucoma H40.9    Goiter- with thyroid nodules per U/S E04.9    Abnormal TSH R79.89    Tubular adenoma of colon 6/22/11 D12.6     Past Medical History:   Diagnosis Date    Arthritis     established with Dr. Batista [place] chiropractor    Autoimmune disease ([facility] [address]     Blindness     left eye; and periperal vision is absent in [facility] Consultant Dr. Ari Kelley HIV (human immunodeficiency virus infection) [facility]) dx Juana Graham Ms. Ramos; viral loads undetectable for 13 yrs    Hypercholesterolemia     Hypertension     Obese     Preauricular sinus       Past Surgical History:   Procedure Laterality Date    COLONOSCOPY N/A 8/12/2016    COL[facility] HX COLONOSCOPY      2011    HX HEENT      eye surg for glaucoma     Current Outpatient Medications   Medication Sig Dispense Refill    amLODIPine (NORVASC) 10 mg tablet Take 1 Tab by mouth daily.  90 Tab 2    tiZANidine (ZANAFLEX) 4 mg tablet TAKE 1 TABLET THREE TIMES DAILY AS NEEDED FOR MUSCLE SPASM 90 Tab 1    fluticasone propionate (FLONASE) 50 mcg/actuation nasal spray 2 Sprays by Both Nostrils route daily. 1 Bottle 2    montelukast (SINGULAIR) 10 mg tablet Take 1 Tab by mouth daily. 90 Tab 1    triamcinolone acetonide (KENALOG) 0.5 % ointment Apply  to affected area two (2) times a day. use thin layer 30 g 0    rosuvastatin (CRESTOR) 10 mg tablet Take 1 Tab by mouth nightly. 90 Tab 2    cyanocobalamin (VITAMIN B-12) 1,000 mcg tablet Take 1,000 mcg by mouth daily.  cinnamon bark (CINNAMON) 500 mg cap Take  by mouth.  wfhkcnsry-jlsjptby-vhuavdw ala (BIKTARVY) tab tablet Take 1 Tab by mouth daily.  timolol (TIMOPTIC) 0.5 % ophthalmic solution Administer 1 Drop to both eyes two (2) times a day.  omega-3 fatty acids-vitamin e 1,000 mg cap Take 1 Cap by mouth.  multivitamin (ONE A DAY) tablet Take 1 Tab by mouth daily.  ascorbic acid (VITAMIN C) 500 mg tablet Take 500 mg by mouth daily.  therapeutic multivitamin (THEREMS) tablet Take 1 Tab by mouth. No Known Allergies    Family History   Family history unknown: Yes     Social History     Tobacco Use    Smoking status: Former Smoker     Last attempt to quit: 2006     Years since quittin.7    Smokeless tobacco: Never Used    Tobacco comment: smoked for 30 years: 1/2 ppd. Substance Use Topics    Alcohol use: No     Alcohol/week: 0.0 standard drinks       Depression Risk Factor Screening:     3 most recent PHQ Screens 2019   Little interest or pleasure in doing things Not at all   Feeling down, depressed, irritable, or hopeless Not at all   Total Score PHQ 2 0       Alcohol Risk Factor Screening (MALE < 65): Do you average more than 2 drinks per night or 14 drinks a week: No    On any one occasion in the past three months have you have had more than 4 drinks containing alcohol:  No      Functional Ability and Level of Safety:   Hearing: Hearing is good.  Whisper Test done with normal results. Activities of Daily Living: The home contains: no safety equipment. Patient does total self care     Ambulation: with mild difficulty    Fall Risk:  Fall Risk Assessment, last 12 mths 5/23/2017   Able to walk? Yes   Fall in past 12 months? No       Abuse Screen:  Patient is not abused    Cognitive Screening   Has your family/caregiver stated any concerns about your memory: no  Cognitive Screening: Normal    Patient Care Team   Patient Care Team:  Millicent Goodwin MD as PCP - General (Family Practice)  Millicent Goodwin MD as PCP - [facility]  Inocente Choudhury MD as Physician (Ophthalmology)  Hannah Sarabia RN as Ambulatory Care Manager (Internal Medicine)  Ac Knowles RN as Ambulatory Care Manager  Elizabeth Sloan MD as Physician (Orthopedic Surgery)  Rani Arias MD as Physician (Ophthalmology)    Assessment/Plan   Education and counseling provided:  Are appropriate based on today's review and evaluation  End-of-Life planning (with patient's consent)  Colorectal cancer screening tests    Diagnoses and all orders for this visit:    1. Initial Medicare annual wellness visit  -     FULL CODE    2. Colon cancer screening  -     REFERRAL TO GASTROENTEROLOGY    3. Advanced care planning/counseling discussion       Pursuant to the emergency declaration under the [address] and [facility] waiver authority and the [employer] Act, this Virtual Visit was conducted, with patient's consent, to reduce the patient's risk of exposure to COVID-19 and provide continuity of care for an established patient. Services were provided through a video synchronous discussion virtually to substitute for in-person appointment.

## 2020-04-09 DIAGNOSIS — M62.838 MUSCLE SPASM: ICD-10-CM

## 2020-04-12 RX ORDER — TIZANIDINE 4 MG/1
TABLET ORAL
Qty: 90 TAB | Refills: 1 | Status: SHIPPED | OUTPATIENT
Start: 2020-04-12 | End: 2020-05-27

## 2020-05-19 DIAGNOSIS — J30.9 ALLERGIC RHINITIS, UNSPECIFIED SEASONALITY, UNSPECIFIED TRIGGER: ICD-10-CM

## 2020-05-19 RX ORDER — FLUTICASONE PROPIONATE 50 MCG
SPRAY, SUSPENSION (ML) NASAL
Qty: 48 G | Refills: 1 | Status: SHIPPED | OUTPATIENT
Start: 2020-05-19

## 2020-05-27 DIAGNOSIS — M62.838 MUSCLE SPASM: ICD-10-CM

## 2020-05-27 RX ORDER — TIZANIDINE 4 MG/1
TABLET ORAL
Qty: 90 TAB | Refills: 1 | Status: SHIPPED | OUTPATIENT
Start: 2020-05-27 | End: 2020-07-16

## 2020-05-31 DIAGNOSIS — J30.9 ALLERGIC RHINITIS, UNSPECIFIED SEASONALITY, UNSPECIFIED TRIGGER: ICD-10-CM

## 2020-06-05 RX ORDER — MONTELUKAST SODIUM 10 MG/1
TABLET ORAL
Qty: 90 TAB | Refills: 1 | Status: SHIPPED | OUTPATIENT
Start: 2020-06-05

## 2020-07-06 RX ORDER — ROSUVASTATIN CALCIUM 10 MG/1
10 TABLET, COATED ORAL
Qty: 90 TAB | Refills: 2 | Status: SHIPPED | OUTPATIENT
Start: 2020-07-06

## 2020-07-16 DIAGNOSIS — M62.838 MUSCLE SPASM: ICD-10-CM

## 2020-07-16 RX ORDER — TIZANIDINE 4 MG/1
TABLET ORAL
Qty: 90 TAB | Refills: 1 | Status: SHIPPED | OUTPATIENT
Start: 2020-07-16 | End: 2020-09-01

## 2020-08-31 DIAGNOSIS — M62.838 MUSCLE SPASM: ICD-10-CM

## 2020-09-01 RX ORDER — TIZANIDINE 4 MG/1
TABLET ORAL
Qty: 90 TAB | Refills: 1 | Status: SHIPPED | OUTPATIENT
Start: 2020-09-01 | End: 2020-10-18

## 2020-09-28 NOTE — PROGRESS NOTES
2020    Zoya Lawson (:  1960) is a 56 y.o. male, here for evaluation of the following medical concerns:    HPI    Well Adult Physical: Patient here for a comprehensive physical exam.The patient reports problems -elevated blood pressure, erectile dysfunction, chronic low back pain  Do you take any herbs or supplements that were not prescribed by a doctor? no Are you taking calcium supplements? no Are you taking aspirin daily? no    Sexual activity: has sex with males   Diet: Healthy  Exercise: walks 5 time(s) per week  Seatbelt use: yes    Review of Systems   Constitutional: Negative for activity change, fatigue and unexpected weight change. HENT: Negative for hearing loss. Eyes: Negative for visual disturbance. Respiratory: Negative for cough, chest tightness, shortness of breath and wheezing. Cardiovascular: Negative for chest pain, palpitations and leg swelling. Gastrointestinal: Negative for blood in stool. Endocrine: Negative for polydipsia, polyphagia and polyuria. Genitourinary: Negative for difficulty urinating, discharge, dysuria, frequency, genital sores and hematuria.        + Erectile dysfunction   Musculoskeletal: Positive for back pain (Bilateral lower back; followed by chiropractor in [place]). Negative for arthralgias. Skin: Negative for rash. Allergic/Immunologic: Negative for environmental allergies. Neurological: Negative for dizziness, syncope, weakness, light-headedness and headaches. Hematological: Does not bruise/bleed easily. Psychiatric/Behavioral: Negative for dysphoric mood and sleep disturbance. The patient is not nervous/anxious. Prior to Visit Medications    Medication Sig Taking?  Authorizing Provider   tadalafil (CIALIS) 10 MG tablet Take 1 tablet by mouth as needed for Erectile Dysfunction Yes Enma Cruz DO   Blood Pressure KIT 1 kit by Does not apply route daily Yes Enma Cruz DO   amLODIPine (NORVASC) 10 MG tablet   Historical Provider, MD   BIKTARVY -25 MG TABS per tablet   Historical Provider, MD   rosuvastatin (CRESTOR) 10 MG tablet   Historical Provider, MD   tiZANidine (ZANAFLEX) 4 MG tablet   Historical Provider, MD        No Known Allergies    Past Medical History:   Diagnosis Date    Glaucoma     High blood cholesterol     High cholesterol     HIV (human immunodeficiency virus infection) ([facility] [address]        History reviewed. No pertinent surgical history.     Social History     Socioeconomic History    Marital status: Single     Spouse name: Not on file    Number of children: Not on file    Years of education: Not on file    Highest education level: Not on file   Occupational History    Not on file   Social Needs    Financial resource strain: Not on file    Food insecurity     Worry: Not on file     Inability: Not on file    Transportation needs     Medical: Not on file     Non-medical: Not on file   Tobacco Use    Smoking status: Never Smoker    Smokeless tobacco: Never Used   Substance and Sexual Activity    Alcohol use: Not Currently    Drug use: Not Currently    Sexual activity: Not Currently   Lifestyle    Physical activity     Days per week: Not on file     Minutes per session: Not on file    Stress: Not on file   Relationships    Social connections     Talks on phone: Not on file     Gets together: Not on file     Attends Anabaptist service: Not on file     Active member of club or organization: Not on file     Attends meetings of clubs or organizations: Not on file     Relationship status: Not on file    Intimate partner violence     Fear of current or ex partner: Not on file     Emotionally abused: Not on file     Physically abused: Not on file     Forced sexual activity: Not on file   Other Topics Concern    Not on file   Social History Narrative    Not on file        Family History   Adopted: Yes       Vitals:    09/29/20 0924   BP: (!) 166/104   Pulse: 68   Temp: 97 °F (36.1 °C)   TempSrc: Oral breath and peripheral edema. Antihypertensive medication side effects: no medication side effects noted. Use of agents associated with hypertension: none. No results found for: NA No results found for: BUN No results found for: GLUCOSE   No results found for: K No results found for: CREATININE      Hyperlipidemia:  No new myalgias or GI upset on rosuvastatin (Crestor). No results found for: CHOL, TRIG, HDL, LDLCALC, LDLDIRECT  No results found for: ALT, AST     Erectile Dysfunction: Patient complains of erectile dysfunction. Onset of dysfunction was several months ago and was gradual in onset. Patient states the nature of difficulty is both attaining and maintaining erection. Full erections occur rarely. Partial erections occur occasionally on waking. Libido is not affected. Risk factors for ED include none. Patient denies history of cardiovascular disease, diabetes mellitus and cranial, spinal, or pelvic trauma. Patient has multiple sexual partners. Previous treatment of ED includes none. ASSESSMENT/PLAN:  Yeison Gomez was seen today for established new doctor. Diagnoses and all orders for this visit:    Encounter for medical examination to establish care: Recently moved here from [place]. Has tried being more healthy since relocating. Counseled on healthy diet and exercise regimen. Essential hypertension: Blood pressure elevated today, but patient did miss her morning blood pressure medication. Will give him blood pressure cuff for home and have him check 2 days/week and call with values in about 2 weeks. Will adjust as needed. -     Blood Pressure KIT; 1 kit by Does not apply route daily    Pure hypercholesterolemia: Currently on low-dose statin. He is having medical records sent. Continue for now, but adjust as needed. Suspect he would benefit from higher dose. Asymptomatic HIV infection ([facility] [address]):  Followed by infectious disease and [place]; does not recall most recent CD4 count. Has appointment with local infectious disease disease tomorrow. 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 Needs local ophthalmologist.  Referral placed. -     SHANNON Mcdonald MD, Ophthalmology, [facility]    Vasculogenic erectile dysfunction, unspecified vasculogenic erectile dysfunction type: No obvious cause. No contraindications to treatment. Counseled on adverse side effects and when to call or go to the emergency department. Follow-up 1 month to 6 weeks. -     tadalafil (CIALIS) 10 MG tablet; Take 1 tablet by mouth as needed for Erectile Dysfunction    Class 1 obesity due to excess calories without serious comorbidity with body mass index (BMI) of 31.0 to 31.9 in adult: Counseled on healthy diet and exercise regimen. Encounter for hepatitis C screening test for low risk patient: Defer into medical records obtained. Screen for colon cancer: Reports normal colonoscopy 4 years ago. Will update in care gaps when medical records obtained. Need for immunization against influenza: Declined    Chronic bilateral low back pain without sciatica: Chronic bilateral low back pain. Discussed appropriate exercise regimen and how weight loss may benefit. Followed by chiropractor in [place] and would like referral today. Placed. Did discuss pain management such as referral to Dr. Rosa M Martinez. Not interested today. -     Ambulatory referral to Chiropractic    Return in about 6 weeks (around 11/10/2020). An  electronic signature was used to authenticate this note.     --Jamie Cagle, DO on 9/29/2020 at 10:08 AM

## 2020-09-28 NOTE — PATIENT INSTRUCTIONS
Patient Education        Well Visit, Men 48 to 72: Care Instructions  Your Care Instructions     Physical exams can help you stay healthy. Your doctor has checked your overall health and may have suggested ways to take good care of yourself. He or she also may have recommended tests. At home, you can help prevent illness with healthy eating, regular exercise, and other steps. Follow-up care is a key part of your treatment and safety. Be sure to make and go to all appointments, and call your doctor if you are having problems. It's also a good idea to know your test results and keep a list of the medicines you take. How can you care for yourself at home? · Reach and stay at a healthy weight. This will lower your risk for many problems, such as obesity, diabetes, heart disease, and high blood pressure. · Get at least 30 minutes of exercise on most days of the week. Walking is a good choice. You also may want to do other activities, such as running, swimming, cycling, or playing tennis or team sports. · Do not smoke. Smoking can make health problems worse. If you need help quitting, talk to your doctor about stop-smoking programs and medicines. These can increase your chances of quitting for good. · Protect your skin from too much sun. When you're outdoors from 10 a.m. to 4 p.m., stay in the shade or cover up with clothing and a hat with a wide brim. Wear sunglasses that block UV rays. Even when it's cloudy, put broad-spectrum sunscreen (SPF 30 or higher) on any exposed skin. · See a dentist one or two times a year for checkups and to have your teeth cleaned. · Wear a seat belt in the car. Follow your doctor's advice about when to have certain tests. These tests can spot problems early. · Cholesterol. Your doctor will tell you how often to have this done based on your overall health and other things that can increase your risk for heart attack and stroke. · Blood pressure.  Have your blood pressure checked during a routine doctor visit. Your doctor will tell you how often to check your blood pressure based on your age, your blood pressure results, and other factors. · Prostate exam. Talk to your doctor about whether you should have a blood test (called a PSA test) for prostate cancer. Experts recommend that you discuss the benefits and risks of the test with your doctor before you decide whether to have this test.  · Diabetes. Ask your doctor whether you should have tests for diabetes. · Vision. Some experts recommend that you have yearly exams for glaucoma and other age-related eye problems starting at age 48. · Hearing. Tell your doctor if you notice any change in your hearing. You can have tests to find out how well you hear. · Colorectal cancer. Your risk for colorectal cancer gets higher as you get older. Some experts say that adults should start regular screening at age 48 and stop at age 76. Others say to start before age 48 or continue after age 76. Talk with your doctor about your risk and when to start and stop screening. · Heart attack and stroke risk. At least every 4 to 6 years, you should have your risk for heart attack and stroke assessed. Your doctor uses factors such as your age, blood pressure, cholesterol, and whether you smoke or have diabetes to show what your risk for a heart attack or stroke is over the next 10 years. · Abdominal aortic aneurysm. Ask your doctor whether you should have a test to check for an aneurysm. You may need a test if you ever smoked or if your parent, brother, sister, or child has had an aneurysm. When should you call for help? Watch closely for changes in your health, and be sure to contact your doctor if you have any problems or symptoms that concern you. Where can you learn more? Go to https://wendy.[employer]. org and sign in to your [employer] account.  Enter C175 in the [facility] box to learn more about \"Well Visit, Men 48 to 72: Care Instructions. \"     If you do not have an account, please click on the \"Sign Up Now\" link. Current as of: August 22, 2019               Content Version: 12.5  © 4668-2362 Healthwise, Incorporated. Care instructions adapted under license by [facility] ([facility]). If you have questions about a medical condition or this instruction, always ask your healthcare professional. [address] any warranty or liability for your use of this information.

## 2020-09-29 ENCOUNTER — OFFICE VISIT (OUTPATIENT)
Dept: PRIMARY CARE CLINIC | Age: 60
End: 2020-09-29
Payer: MEDICARE

## 2020-09-29 VITALS
HEART RATE: 68 BPM | HEIGHT: 73 IN | TEMPERATURE: 97 F | BODY MASS INDEX: 31.81 KG/M2 | SYSTOLIC BLOOD PRESSURE: 166 MMHG | WEIGHT: 240 LBS | DIASTOLIC BLOOD PRESSURE: 104 MMHG | OXYGEN SATURATION: 99 %

## 2020-09-29 PROBLEM — Z21 ASYMPTOMATIC HIV INFECTION (HCC): Status: ACTIVE | Noted: 2020-09-29

## 2020-09-29 PROCEDURE — 99386 PREV VISIT NEW AGE 40-64: CPT | Performed by: STUDENT IN AN ORGANIZED HEALTH CARE EDUCATION/TRAINING PROGRAM

## 2020-09-29 PROCEDURE — 3017F COLORECTAL CA SCREEN DOC REV: CPT | Performed by: STUDENT IN AN ORGANIZED HEALTH CARE EDUCATION/TRAINING PROGRAM

## 2020-09-29 PROCEDURE — G8417 CALC BMI ABV UP PARAM F/U: HCPCS | Performed by: STUDENT IN AN ORGANIZED HEALTH CARE EDUCATION/TRAINING PROGRAM

## 2020-09-29 PROCEDURE — G8427 DOCREV CUR MEDS BY ELIG CLIN: HCPCS | Performed by: STUDENT IN AN ORGANIZED HEALTH CARE EDUCATION/TRAINING PROGRAM

## 2020-09-29 PROCEDURE — 1036F TOBACCO NON-USER: CPT | Performed by: STUDENT IN AN ORGANIZED HEALTH CARE EDUCATION/TRAINING PROGRAM

## 2020-09-29 PROCEDURE — 99203 OFFICE O/P NEW LOW 30 MIN: CPT | Performed by: STUDENT IN AN ORGANIZED HEALTH CARE EDUCATION/TRAINING PROGRAM

## 2020-09-29 RX ORDER — TADALAFIL 10 MG/1
10 TABLET ORAL PRN
Qty: 30 TABLET | Refills: 3 | Status: SHIPPED | OUTPATIENT
Start: 2020-09-29 | End: 2020-10-08 | Stop reason: SDUPTHER

## 2020-09-29 RX ORDER — ROSUVASTATIN CALCIUM 10 MG/1
TABLET, COATED ORAL
COMMUNITY
Start: 2020-08-01 | End: 2021-03-12 | Stop reason: SDUPTHER

## 2020-09-29 RX ORDER — BLOOD PRESSURE TEST KIT
1 KIT MISCELLANEOUS DAILY
Qty: 1 KIT | Refills: 0 | Status: SHIPPED | OUTPATIENT
Start: 2020-09-29

## 2020-09-29 RX ORDER — BICTEGRAVIR SODIUM, EMTRICITABINE, AND TENOFOVIR ALAFENAMIDE FUMARATE 50; 200; 25 MG/1; MG/1; MG/1
TABLET ORAL
COMMUNITY
Start: 2020-09-03

## 2020-09-29 RX ORDER — AMLODIPINE BESYLATE 10 MG/1
TABLET ORAL
COMMUNITY
Start: 2020-08-28 | End: 2020-12-01 | Stop reason: SDUPTHER

## 2020-09-29 RX ORDER — TIZANIDINE 4 MG/1
TABLET ORAL
COMMUNITY
Start: 2020-09-26 | End: 2021-02-01 | Stop reason: SDUPTHER

## 2020-09-29 ASSESSMENT — ENCOUNTER SYMPTOMS
SHORTNESS OF BREATH: 0
BLOOD IN STOOL: 0
COUGH: 0
CHEST TIGHTNESS: 0
BACK PAIN: 1
WHEEZING: 0

## 2020-09-29 ASSESSMENT — PATIENT HEALTH QUESTIONNAIRE - PHQ9
2. FEELING DOWN, DEPRESSED OR HOPELESS: 0
SUM OF ALL RESPONSES TO PHQ9 QUESTIONS 1 & 2: 0
SUM OF ALL RESPONSES TO PHQ QUESTIONS 1-9: 0
1. LITTLE INTEREST OR PLEASURE IN DOING THINGS: 0
SUM OF ALL RESPONSES TO PHQ QUESTIONS 1-9: 0

## 2020-10-02 ENCOUNTER — TELEPHONE (OUTPATIENT)
Dept: PRIMARY CARE CLINIC | Age: 60
End: 2020-10-02

## 2020-10-02 NOTE — TELEPHONE ENCOUNTER
[facility] of [phone], and [phone] for patient to call for  [facility]

## 2020-10-08 ENCOUNTER — TELEPHONE (OUTPATIENT)
Dept: PRIMARY CARE CLINIC | Age: 60
End: 2020-10-08

## 2020-10-08 RX ORDER — TADALAFIL 10 MG/1
10 TABLET ORAL PRN
Qty: 30 TABLET | Refills: 3 | Status: SHIPPED | OUTPATIENT
Start: 2020-10-08 | End: 2021-06-01 | Stop reason: SDUPTHER

## 2020-10-08 NOTE — TELEPHONE ENCOUNTER
PATIENT NEED TO HAVE THIS SENT TO ANOTHER PHARMACY DUE TO THE PRICE WHERE IT WAS ORIGINALLY SENT tadalafil (CIALIS) 10 MG tablet  HUMAN TOLD THE PATIENT TO CALL US AND HAVE US SEND IT TO    [facility] [address] [phone] PATIENT ASKED IF HE COULD BE CALLED WHEN IT HAS BEEN CALLED IN PLEASE AND THANK YOU

## 2020-10-18 DIAGNOSIS — M62.838 MUSCLE SPASM: ICD-10-CM

## 2020-10-18 RX ORDER — TIZANIDINE 4 MG/1
TABLET ORAL
Qty: 90 TAB | Refills: 1 | Status: SHIPPED | OUTPATIENT
Start: 2020-10-18

## 2020-11-30 NOTE — PROGRESS NOTES
2020     Arianna Davis (:  1960) is a 56 y.o. male, here for evaluation of the following medical concerns:    HPI  Treatment Adherence:   Medication compliance:  compliant all of the time  Diet compliance:  compliant most of the time  Weight trend: decreasing  Current exercise: walks 5 time(s) per week  Barriers: none    Hypertension:  Home blood pressure monitoring: No. Patient denies chest pain, shortness of breath, headache and lightheadedness. Antihypertensive medication side effects: no medication side effects noted. Use of agents associated with hypertension: none. No results found for: NA No results found for: BUN No results found for: GLUCOSE   No results found for: K No results found for: CREATININE      Hyperlipidemia:  No new myalgias or GI upset on rosuvastatin (Crestor). No results found for: CHOL, TRIG, HDL, LDLCALC, LDLDIRECT  No results found for: ALT, AST       Review of Systems   Constitutional: Negative for activity change, fatigue and unexpected weight change. HENT: Negative for hearing loss. Eyes: Positive for visual disturbance (Bilateral glaucoma, baseline left eye blindness, impaired vision right eye). Respiratory: Negative for cough, chest tightness, shortness of breath and wheezing. Cardiovascular: Negative for chest pain, palpitations and leg swelling. Gastrointestinal: Negative for blood in stool. Endocrine: Negative for polydipsia, polyphagia and polyuria. Genitourinary: Negative for difficulty urinating, discharge, dysuria, frequency, genital sores and hematuria.        + Erectile dysfunction   Musculoskeletal: Positive for back pain ( would like referral to local chiropractor). Negative for arthralgias. Skin: Negative for rash. Allergic/Immunologic: Negative for environmental allergies. Neurological: Negative for dizziness, syncope, weakness, light-headedness and headaches.    Hematological: Does not bruise/bleed easily. Psychiatric/Behavioral: Negative for dysphoric mood and sleep disturbance. The patient is not nervous/anxious. Prior to Visit Medications    Medication Sig Taking? Authorizing Provider   montelukast (SINGULAIR) 10 MG tablet Take 10 mg by mouth daily Yes Historical Provider, MD   triamcinolone (KENALOG) 0.05 % OINT ointment Apply topically 2 times daily Yes Littie Rack, DO   amLODIPine (NORVASC) 10 MG tablet Take 1 tablet by mouth daily Yes Littie Rack, DO   tadalafil (CIALIS) 10 MG tablet Take 1 tablet by mouth as needed for Erectile Dysfunction  Littie Rack, DO   BIKTARVY -25 MG TABS per tablet   Historical Provider, MD   rosuvastatin (CRESTOR) 10 MG tablet   Historical Provider, MD   tiZANidine (ZANAFLEX) 4 MG tablet   Historical Provider, MD   Blood Pressure KIT 1 kit by Does not apply route daily  Littie Rack, DO        Social History     Tobacco Use    Smoking status: Never Smoker    Smokeless tobacco: Never Used   Substance Use Topics    Alcohol use: Not Currently        Vitals:    12/01/20 0911 12/01/20 0930   BP: (!) 153/80 128/64   Pulse: 73    Temp: 98.2 °F (36.8 °C)    TempSrc: Oral    SpO2: 100%    Weight: 240 lb (108.9 kg)    Height: 6' 1\" (1.854 m)      Estimated body mass index is 31.66 kg/m² as calculated from the following:    Height as of this encounter: 6' 1\" (1.854 m). Weight as of this encounter: 240 lb (108.9 kg). Physical Exam  Vitals signs reviewed. Constitutional:       Appearance: Normal appearance. He is obese. HENT:      Head: Normocephalic and atraumatic. Right Ear: Tympanic membrane normal.      Left Ear: Tympanic membrane normal.      Nose: Nose normal.      Mouth/Throat:      Mouth: Mucous membranes are moist.      Pharynx: Oropharynx is clear. Eyes:      Conjunctiva/sclera: Conjunctivae normal.      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 Neck:      Musculoskeletal: Normal range of motion and neck supple.

## 2020-11-30 NOTE — PATIENT INSTRUCTIONS
DASH Diet: Care Instructions  Your Care Instructions     The DASH diet is an eating plan that can help lower your blood pressure. DASH stands for Dietary Approaches to Stop Hypertension. Hypertension is high blood pressure. The DASH diet focuses on eating foods that are high in calcium, potassium, and magnesium. These nutrients can lower blood pressure. The foods that are highest in these nutrients are fruits, vegetables, low-fat dairy products, nuts, seeds, and legumes. But taking calcium, potassium, and magnesium supplements instead of eating foods that are high in those nutrients does not have the same effect. The DASH diet also includes whole grains, fish, and poultry. The DASH diet is one of several lifestyle changes your doctor may recommend to lower your high blood pressure. Your doctor may also want you to decrease the amount of sodium in your diet. Lowering sodium while following the DASH diet can lower blood pressure even further than just the DASH diet alone. Follow-up care is a key part of your treatment and safety. Be sure to make and go to all appointments, and call your doctor if you are having problems. It's also a good idea to know your test results and keep a list of the medicines you take. How can you care for yourself at home? Following the DASH diet  · Eat 4 to 5 servings of fruit each day. A serving is 1 medium-sized piece of fruit, ½ cup chopped or canned fruit, 1/4 cup dried fruit, or 4 ounces (½ cup) of fruit juice. Choose fruit more often than fruit juice. · Eat 4 to 5 servings of vegetables each day. A serving is 1 cup of lettuce or raw leafy vegetables, ½ cup of chopped or cooked vegetables, or 4 ounces (½ cup) of vegetable juice. Choose vegetables more often than vegetable juice. · Get 2 to 3 servings of low-fat and fat-free dairy each day. A serving is 8 ounces of milk, 1 cup of yogurt, or 1 ½ ounces of cheese. · Eat 6 to 8 servings of grains each day.  A serving is 1 slice of bread, 1 ounce of dry cereal, or ½ cup of cooked rice, pasta, or cooked cereal. Try to choose whole-grain products as much as possible. · Limit lean meat, poultry, and fish to 2 servings each day. A serving is 3 ounces, about the size of a deck of cards. · Eat 4 to 5 servings of nuts, seeds, and legumes (cooked dried beans, lentils, and split peas) each week. A serving is 1/3 cup of nuts, 2 tablespoons of seeds, or ½ cup of cooked beans or peas. · Limit fats and oils to 2 to 3 servings each day. A serving is 1 teaspoon of vegetable oil or 2 tablespoons of salad dressing. · Limit sweets and added sugars to 5 servings or less a week. A serving is 1 tablespoon jelly or jam, ½ cup sorbet, or 1 cup of lemonade. · Eat less than 2,300 milligrams (mg) of sodium a day. If you limit your sodium to 1,500 mg a day, you can lower your blood pressure even more. Tips for success  · Start small. Do not try to make dramatic changes to your diet all at once. You might feel that you are missing out on your favorite foods and then be more likely to not follow the plan. Make small changes, and stick with them. Once those changes become habit, add a few more changes. · Try some of the following:  ? Make it a goal to eat a fruit or vegetable at every meal and at snacks. This will make it easy to get the recommended amount of fruits and vegetables each day. ? Try yogurt topped with fruit and nuts for a snack or healthy dessert. ? Add lettuce, tomato, cucumber, and onion to sandwiches. ? Combine a ready-made pizza crust with low-fat mozzarella cheese and lots of vegetable toppings. Try using tomatoes, squash, spinach, broccoli, carrots, cauliflower, and onions. ? Have a variety of cut-up vegetables with a low-fat dip as an appetizer instead of chips and dip. ? Sprinkle sunflower seeds or chopped almonds over salads. Or try adding chopped walnuts or almonds to cooked vegetables. ? Try some vegetarian meals using beans and peas. Add garbanzo or kidney beans to salads. Make burritos and tacos with mashed cardona beans or black beans. Where can you learn more? Go to https://Genome.[employer]. org and sign in to your [employer] account. Enter F036 in the [facility] box to learn more about \"DASH Diet: Care Instructions. \"     If you do not have an account, please click on the \"Sign Up Now\" link. Current as of: December 16, 2019               Content Version: 12.5  © 1346-8927 Healthwise, Incorporated. Care instructions adapted under license by [facility] ([facility]). If you have questions about a medical condition or this instruction, always ask your healthcare professional. [address] any warranty or liability for your use of this information.

## 2020-12-01 ENCOUNTER — OFFICE VISIT (OUTPATIENT)
Dept: PRIMARY CARE CLINIC | Age: 60
End: 2020-12-01
Payer: MEDICARE

## 2020-12-01 VITALS
TEMPERATURE: 98.2 F | OXYGEN SATURATION: 100 % | HEART RATE: 73 BPM | SYSTOLIC BLOOD PRESSURE: 128 MMHG | WEIGHT: 240 LBS | HEIGHT: 73 IN | BODY MASS INDEX: 31.81 KG/M2 | DIASTOLIC BLOOD PRESSURE: 64 MMHG

## 2020-12-01 DIAGNOSIS — I10 ESSENTIAL HYPERTENSION: ICD-10-CM

## 2020-12-01 DIAGNOSIS — E78.00 PURE HYPERCHOLESTEROLEMIA: ICD-10-CM

## 2020-12-01 DIAGNOSIS — Z13.1 SCREENING FOR DIABETES MELLITUS: ICD-10-CM

## 2020-12-01 DIAGNOSIS — Z11.59 ENCOUNTER FOR HEPATITIS C SCREENING TEST FOR LOW RISK PATIENT: ICD-10-CM

## 2020-12-01 LAB
A/G RATIO: 1.7 (ref 1.1–2.2)
ALBUMIN SERPL-MCNC: 4.5 G/DL (ref 3.4–5)
ALP BLD-CCNC: 90 U/L (ref 40–129)
ALT SERPL-CCNC: 20 U/L (ref 10–40)
ANION GAP SERPL CALCULATED.3IONS-SCNC: 9 MMOL/L (ref 3–16)
AST SERPL-CCNC: 17 U/L (ref 15–37)
BILIRUB SERPL-MCNC: 0.5 MG/DL (ref 0–1)
BUN BLDV-MCNC: 11 MG/DL (ref 7–20)
CALCIUM SERPL-MCNC: 9.5 MG/DL (ref 8.3–10.6)
CHLORIDE BLD-SCNC: 106 MMOL/L (ref 99–110)
CHOLESTEROL, FASTING: 133 MG/DL (ref 0–199)
CO2: 28 MMOL/L (ref 21–32)
CREAT SERPL-MCNC: 1 MG/DL (ref 0.8–1.3)
ESTIMATED AVERAGE GLUCOSE: 122.6 MG/DL
GFR AFRICAN AMERICAN: >60
GFR NON-AFRICAN AMERICAN: >60
GLOBULIN: 2.6 G/DL
GLUCOSE BLD-MCNC: 95 MG/DL (ref 70–99)
HBA1C MFR BLD: 5.9 %
HDLC SERPL-MCNC: 51 MG/DL (ref 40–60)
HEPATITIS C ANTIBODY INTERPRETATION: NORMAL
LDL CHOLESTEROL CALCULATED: 66 MG/DL
POTASSIUM SERPL-SCNC: 4.2 MMOL/L (ref 3.5–5.1)
SODIUM BLD-SCNC: 143 MMOL/L (ref 136–145)
TOTAL PROTEIN: 7.1 G/DL (ref 6.4–8.2)
TRIGLYCERIDE, FASTING: 82 MG/DL (ref 0–150)
VLDLC SERPL CALC-MCNC: 16 MG/DL

## 2020-12-01 PROCEDURE — 1036F TOBACCO NON-USER: CPT | Performed by: STUDENT IN AN ORGANIZED HEALTH CARE EDUCATION/TRAINING PROGRAM

## 2020-12-01 PROCEDURE — G8417 CALC BMI ABV UP PARAM F/U: HCPCS | Performed by: STUDENT IN AN ORGANIZED HEALTH CARE EDUCATION/TRAINING PROGRAM

## 2020-12-01 PROCEDURE — G8484 FLU IMMUNIZE NO ADMIN: HCPCS | Performed by: STUDENT IN AN ORGANIZED HEALTH CARE EDUCATION/TRAINING PROGRAM

## 2020-12-01 PROCEDURE — 3017F COLORECTAL CA SCREEN DOC REV: CPT | Performed by: STUDENT IN AN ORGANIZED HEALTH CARE EDUCATION/TRAINING PROGRAM

## 2020-12-01 PROCEDURE — 99214 OFFICE O/P EST MOD 30 MIN: CPT | Performed by: STUDENT IN AN ORGANIZED HEALTH CARE EDUCATION/TRAINING PROGRAM

## 2020-12-01 PROCEDURE — G8427 DOCREV CUR MEDS BY ELIG CLIN: HCPCS | Performed by: STUDENT IN AN ORGANIZED HEALTH CARE EDUCATION/TRAINING PROGRAM

## 2020-12-01 RX ORDER — TRIAMCINOLONE ACETONIDE OINTMENT USP, 0.05% 0.5 MG/G
OINTMENT TOPICAL 2 TIMES DAILY
Qty: 430 G | Refills: 0 | Status: SHIPPED | OUTPATIENT
Start: 2020-12-01 | End: 2021-03-12 | Stop reason: SDUPTHER

## 2020-12-01 RX ORDER — TRIAMCINOLONE ACETONIDE OINTMENT USP, 0.05% 0.5 MG/G
OINTMENT TOPICAL 2 TIMES DAILY
Qty: 430 G | Refills: 0 | Status: SHIPPED | OUTPATIENT
Start: 2020-12-01 | End: 2020-12-01 | Stop reason: SDUPTHER

## 2020-12-01 RX ORDER — MONTELUKAST SODIUM 10 MG/1
10 TABLET ORAL DAILY
COMMUNITY
Start: 2020-06-05 | End: 2021-06-01 | Stop reason: SDUPTHER

## 2020-12-01 RX ORDER — AMLODIPINE BESYLATE 10 MG/1
10 TABLET ORAL DAILY
Qty: 30 TABLET | Refills: 5 | Status: SHIPPED | OUTPATIENT
Start: 2020-12-01 | End: 2021-06-01 | Stop reason: SDUPTHER

## 2020-12-01 RX ORDER — AMLODIPINE BESYLATE 10 MG/1
10 TABLET ORAL DAILY
Qty: 30 TABLET | Refills: 5 | Status: SHIPPED | OUTPATIENT
Start: 2020-12-01 | End: 2020-12-01 | Stop reason: SDUPTHER

## 2020-12-01 ASSESSMENT — ENCOUNTER SYMPTOMS
BACK PAIN: 1
COUGH: 0
SHORTNESS OF BREATH: 0
BLOOD IN STOOL: 0
WHEEZING: 0
CHEST TIGHTNESS: 0

## 2020-12-07 ENCOUNTER — TELEPHONE (OUTPATIENT)
Dept: PRIMARY CARE CLINIC | Age: 60
End: 2020-12-07

## 2020-12-11 ENCOUNTER — TELEPHONE (OUTPATIENT)
Dept: PRIMARY CARE CLINIC | Age: 60
End: 2020-12-11

## 2020-12-12 NOTE — TELEPHONE ENCOUNTER
----- Message from Laila Mariee sent at 12/11/2020  2:34 PM EST -----  Subject: Message to Provider    QUESTIONS  Information for Provider? Pt says PCP referred him to a psychologist but   pt says he would like to know if there is someone you can refer him to   that is closer or someone in [facility] that he could see since it is hard   for him to travel there  ---------------------------------------------------------------------------  --------------  TransitScreen  What is the best way for the office to contact you? OK to leave message on   voicemail  Preferred Call Back Phone Number? [phone]  ---------------------------------------------------------------------------  --------------  SCRIPT ANSWERS  Relationship to Patient?  Self

## 2020-12-14 NOTE — TELEPHONE ENCOUNTER
We may need to have the patient call his insurance and see who they cover in [address].

## 2020-12-14 NOTE — TELEPHONE ENCOUNTER
Dr Javier Presume,  Unfortunately I don't know anyone on this insurance panel. This insurance panel tends to have a very short list of psychotherapists so best for pt to get that list from his insurance. I do know a few therapists in [place] and I would be happy to review to help pt if he doesn't feel he could choose on his own.    CW [phone]

## 2021-01-29 ENCOUNTER — TELEPHONE (OUTPATIENT)
Dept: PRIMARY CARE CLINIC | Age: 61
End: 2021-01-29

## 2021-01-29 NOTE — TELEPHONE ENCOUNTER
tiZANidine (ZANAFLEX) 4 MG tablet [[id number]  Pt is requesting a refill and would like it to be sent to [facility]

## 2021-02-01 RX ORDER — TIZANIDINE 4 MG/1
4 TABLET ORAL EVERY 8 HOURS PRN
Qty: 30 TABLET | Refills: 2 | Status: SHIPPED | OUTPATIENT
Start: 2021-02-01 | End: 2021-02-09 | Stop reason: SDUPTHER

## 2021-02-08 NOTE — TELEPHONE ENCOUNTER
Last appt: 12/1/2020  Next appt: 6/1/2021  Due to return:    Last refill was sent to [facility] and should have been [place]

## 2021-02-09 RX ORDER — TIZANIDINE 4 MG/1
4 TABLET ORAL EVERY 8 HOURS PRN
Qty: 30 TABLET | Refills: 2 | Status: SHIPPED | OUTPATIENT
Start: 2021-02-09 | End: 2021-06-01 | Stop reason: SDUPTHER

## 2021-02-12 ENCOUNTER — OFFICE VISIT (OUTPATIENT)
Dept: PRIMARY CARE CLINIC | Age: 61
End: 2021-02-12
Payer: MEDICARE

## 2021-02-12 VITALS — BODY MASS INDEX: 32.34 KG/M2 | HEIGHT: 73 IN | WEIGHT: 244 LBS

## 2021-02-12 DIAGNOSIS — Z00.00 ROUTINE GENERAL MEDICAL EXAMINATION AT A HEALTH CARE FACILITY: Primary | ICD-10-CM

## 2021-02-12 PROCEDURE — G8484 FLU IMMUNIZE NO ADMIN: HCPCS | Performed by: STUDENT IN AN ORGANIZED HEALTH CARE EDUCATION/TRAINING PROGRAM

## 2021-02-12 PROCEDURE — 3017F COLORECTAL CA SCREEN DOC REV: CPT | Performed by: STUDENT IN AN ORGANIZED HEALTH CARE EDUCATION/TRAINING PROGRAM

## 2021-02-12 PROCEDURE — G0439 PPPS, SUBSEQ VISIT: HCPCS | Performed by: STUDENT IN AN ORGANIZED HEALTH CARE EDUCATION/TRAINING PROGRAM

## 2021-02-12 ASSESSMENT — LIFESTYLE VARIABLES: HOW OFTEN DO YOU HAVE A DRINK CONTAINING ALCOHOL: 0

## 2021-02-12 ASSESSMENT — PATIENT HEALTH QUESTIONNAIRE - PHQ9
2. FEELING DOWN, DEPRESSED OR HOPELESS: 0
SUM OF ALL RESPONSES TO PHQ QUESTIONS 1-9: 0
1. LITTLE INTEREST OR PLEASURE IN DOING THINGS: 0

## 2021-02-12 NOTE — PROGRESS NOTES
Medicare Annual Wellness Visit  Name: Lesa Chaves Date: 2021   MRN: <[id number]> Sex: Male   Age: 56 y.o. Ethnicity: Non-/Non    : 1960 Race: Black      Latrice Sanches is here for Medicare AWV    Screenings for behavioral, psychosocial and functional/safety risks, and cognitive dysfunction are all negative except as indicated below. These results, as well as other patient data from the [address], [place], operated by [facility] form, are documented in Flowsheets linked to this Encounter. No Known Allergies      Prior to Visit Medications    Medication Sig Taking? Authorizing Provider   tiZANidine (ZANAFLEX) 4 MG tablet Take 1 tablet by mouth every 8 hours as needed (back pain) Yes Linda Blandon DO   montelukast (SINGULAIR) 10 MG tablet Take 10 mg by mouth daily Yes Historical Provider, MD   amLODIPine (NORVASC) 10 MG tablet Take 1 tablet by mouth daily Yes Linda Blandon DO   triamcinolone (KENALOG) 0.05 % OINT ointment Apply topically 2 times daily Yes Linda Blandon DO   tadalafil (CIALIS) 10 MG tablet Take 1 tablet by mouth as needed for Erectile Dysfunction Yes Linda Blandon DO   BIKTARVY -25 MG TABS per tablet  Yes Historical Provider, MD   rosuvastatin (CRESTOR) 10 MG tablet  Yes Historical Provider, MD   Blood Pressure KIT 1 kit by Does not apply route daily Yes Linda Blandon DO         Past Medical History:   Diagnosis Date    Glaucoma     High blood cholesterol     High cholesterol     HIV (human immunodeficiency virus infection) ([facility] [address]        No past surgical history on file.       Family History   Adopted: Yes       CareTeam (Including outside providers/suppliers regularly involved in providing care):   Patient Care Team:  Linda Blandon DO as PCP - General (Family Medicine)  Linda Blandon DO as PCP - [facility] Empaneled Provider    Wt Readings from Last 3 Encounters:   21 244 lb (110.7 kg)   20 240 lb (108.9 kg)   20 240 lb (108.9 kg)     Vitals:    21 4196 Weight: 244 lb (110.7 kg)   Height: 6' 1.25\" (1.861 m)     Body mass index is 31.97 kg/m². Based upon direct observation of the patient, evaluation of cognition reveals recent and remote memory intact. Patient's complete Health Risk Assessment and screening values have been reviewed and are found in Flowsheets. The following problems were reviewed today and where indicated follow up appointments were made and/or referrals ordered. Positive Risk Factor Screenings with Interventions:            General Health and ACP:  General  In general, how would you say your health is?: Excellent  In the past 7 days, have you experienced any of the following?  New or Increased Pain, New or Increased Fatigue, Loneliness, Social Isolation, Stress or Anger?: None of These  Do you get the social and emotional support that you need?: Yes  Do you have a Living Will?: (!) No  Advance Directives     Power of [address] [facility] [facility] [address] Will ACP-Advance Directive ACP-Power of     Not on File Not on File Not on File Not on File      General Health Risk Interventions:  · No Living Will: Patient declines ACP discussion/assistance    Health Habits/Nutrition:  Health Habits/Nutrition  Do you exercise for at least 20 minutes 2-3 times per week?: Yes  Have you lost any weight without trying in the past 3 months?: No  Do you eat only one meal per day?: No  Have you seen the dentist within the past year?: Yes  Body mass index: (!) 31.97  Health Habits/Nutrition Interventions:  · Dental exam overdue:  patient encouraged to make appointment with his/her dentist    Hearing/Vision:  No exam data present  Hearing/Vision  Do you or your family notice any trouble with your hearing that hasn't been managed with hearing aids?: No  Do you have difficulty driving, watching TV, or doing any of your daily activities because of your eyesight?: (!) Yes  Have you had an eye exam within the past year?: Yes  Hearing/Vision Interventions: · Vision concerns:  patient encouraged to make appointment with his/her eye specialist, patient declines any further evaluation/treatment for this issue   · Patient reported he has an appointment scheduled next week     ADL:  ADLs  In the past 7 days, did you need help from others to perform any of the following everyday activities? Eating, dressing, grooming, bathing, toileting, or walking/balance?: None  In the past 7 days, did you need help from others to take care of any of the following? Laundry, housekeeping, banking/finances, shopping, telephone use, food preparation, transportation, or taking medications?: (!) Transportation(uses service and family/friends)  ADL Interventions:  · Patient declines any further evaluation/treatment for this issue    Personalized Preventive Plan   Current Health Maintenance Status    There is no immunization history on file for this patient. Health Maintenance   Topic Date Due    Meningococcal (ACWY) vaccine (1 - Risk start before 7 months 4-dose series) 1960    Marisol Ripple (MMR) vaccine (1 of 2 - Risk 2-dose series) 08/08/1976    Shingles Vaccine (1 of 2) 08/08/2008    Colon cancer screen colonoscopy  08/08/2008    Flu vaccine (1) 09/01/2020    Annual Wellness Visit (AWV)  09/23/2020    DTaP/Tdap/Td vaccine (2 - Td) 10/17/2021    A1C test (Diabetic or Prediabetic)  12/01/2021    Lipid screen  12/01/2021    Potassium monitoring  12/01/2021    Creatinine monitoring  12/01/2021    Hepatitis A vaccine  Completed    Hepatitis B vaccine  Completed    Pneumococcal 0-64 years Vaccine  Completed    Hepatitis C screen  Completed    Hib vaccine  Aged Out     Recommendations for Zachary Prell Due: see orders and patient instructions/AVS.  .   Recommended screening schedule for the next 5-10 years is provided to the patient in written form: see Patient Instructions/AVS. Oral Marking, LPN, 0/00/0099, performed the documented evaluation under the direct supervision of the attending physician.

## 2021-02-12 NOTE — PATIENT INSTRUCTIONS
Personalized Preventive Plan for Saira Strange - 2/12/2021  Medicare offers a range of preventive health benefits. Some of the tests and screenings are paid in full while other may be subject to a deductible, co-insurance, and/or copay. Some of these benefits include a comprehensive review of your medical history including lifestyle, illnesses that may run in your family, and various assessments and screenings as appropriate. After reviewing your medical record and screening and assessments performed today your provider may have ordered immunizations, labs, imaging, and/or referrals for you. A list of these orders (if applicable) as well as your Preventive Care list are included within your After Visit Summary for your review. Other Preventive Recommendations:    · A preventive eye exam performed by an eye specialist is recommended every 1-2 years to screen for glaucoma; cataracts, macular degeneration, and other eye disorders. · A preventive dental visit is recommended every 6 months. · Try to get at least 150 minutes of exercise per week or 10,000 steps per day on a pedometer . · Order or download the FREE \"Exercise & Physical Activity: Your Everyday Guide\" from [employer]. Call 9-285.246.2309 or search [employer] online. · You need 3590-4432 mg of calcium and 2082-7362 IU of vitamin D per day. It is possible to meet your calcium requirement with diet alone, but a vitamin D supplement is usually necessary to meet this goal.  · When exposed to the sun, use a sunscreen that protects against both UVA and UVB radiation with an SPF of 30 or greater. Reapply every 2 to 3 hours or after sweating, drying off with a towel, or swimming. · Always wear a seat belt when traveling in a car. Always wear a helmet when riding a bicycle or motorcycle.

## 2021-03-12 ENCOUNTER — TELEPHONE (OUTPATIENT)
Dept: PRIMARY CARE CLINIC | Age: 61
End: 2021-03-12

## 2021-03-12 DIAGNOSIS — L20.82 FLEXURAL ECZEMA: ICD-10-CM

## 2021-03-12 RX ORDER — ROSUVASTATIN CALCIUM 10 MG/1
10 TABLET, COATED ORAL DAILY
Qty: 30 TABLET | Refills: 5 | Status: SHIPPED | OUTPATIENT
Start: 2021-03-12 | End: 2021-06-01 | Stop reason: SDUPTHER

## 2021-03-12 RX ORDER — TRIAMCINOLONE ACETONIDE OINTMENT USP, 0.05% 0.5 MG/G
OINTMENT TOPICAL 2 TIMES DAILY
Qty: 430 G | Refills: 0 | Status: SHIPPED | OUTPATIENT
Start: 2021-03-12 | End: 2021-10-05

## 2021-03-12 NOTE — TELEPHONE ENCOUNTER
Last appt: 2/12/2021  Next appt: 6/1/2021  Due to return:      Requesting refill:    Rosuvastatin 10 mg   Triamcinolone Acetonide 0.5 % topical ointment

## 2021-06-01 ENCOUNTER — OFFICE VISIT (OUTPATIENT)
Dept: PRIMARY CARE CLINIC | Age: 61
End: 2021-06-01
Payer: MEDICARE

## 2021-06-01 VITALS
DIASTOLIC BLOOD PRESSURE: 78 MMHG | HEART RATE: 68 BPM | SYSTOLIC BLOOD PRESSURE: 136 MMHG | BODY MASS INDEX: 32.5 KG/M2 | WEIGHT: 245.2 LBS | HEIGHT: 73 IN

## 2021-06-01 DIAGNOSIS — E78.00 PURE HYPERCHOLESTEROLEMIA: ICD-10-CM

## 2021-06-01 DIAGNOSIS — Z21 ASYMPTOMATIC HIV INFECTION (HCC): ICD-10-CM

## 2021-06-01 DIAGNOSIS — Z23 NEED FOR SHINGLES VACCINE: ICD-10-CM

## 2021-06-01 DIAGNOSIS — I10 ESSENTIAL HYPERTENSION: Primary | ICD-10-CM

## 2021-06-01 DIAGNOSIS — Z12.11 SCREEN FOR COLON CANCER: ICD-10-CM

## 2021-06-01 DIAGNOSIS — N52.9 VASCULOGENIC ERECTILE DYSFUNCTION, UNSPECIFIED VASCULOGENIC ERECTILE DYSFUNCTION TYPE: ICD-10-CM

## 2021-06-01 DIAGNOSIS — E66.09 CLASS 1 OBESITY DUE TO EXCESS CALORIES WITH SERIOUS COMORBIDITY AND BODY MASS INDEX (BMI) OF 32.0 TO 32.9 IN ADULT: ICD-10-CM

## 2021-06-01 PROBLEM — E66.811 CLASS 1 OBESITY DUE TO EXCESS CALORIES WITH SERIOUS COMORBIDITY AND BODY MASS INDEX (BMI) OF 32.0 TO 32.9 IN ADULT: Status: ACTIVE | Noted: 2021-06-01

## 2021-06-01 PROCEDURE — G8427 DOCREV CUR MEDS BY ELIG CLIN: HCPCS | Performed by: STUDENT IN AN ORGANIZED HEALTH CARE EDUCATION/TRAINING PROGRAM

## 2021-06-01 PROCEDURE — 1036F TOBACCO NON-USER: CPT | Performed by: STUDENT IN AN ORGANIZED HEALTH CARE EDUCATION/TRAINING PROGRAM

## 2021-06-01 PROCEDURE — 3017F COLORECTAL CA SCREEN DOC REV: CPT | Performed by: STUDENT IN AN ORGANIZED HEALTH CARE EDUCATION/TRAINING PROGRAM

## 2021-06-01 PROCEDURE — 90750 HZV VACC RECOMBINANT IM: CPT | Performed by: STUDENT IN AN ORGANIZED HEALTH CARE EDUCATION/TRAINING PROGRAM

## 2021-06-01 PROCEDURE — 99214 OFFICE O/P EST MOD 30 MIN: CPT | Performed by: STUDENT IN AN ORGANIZED HEALTH CARE EDUCATION/TRAINING PROGRAM

## 2021-06-01 PROCEDURE — 90471 IMMUNIZATION ADMIN: CPT | Performed by: STUDENT IN AN ORGANIZED HEALTH CARE EDUCATION/TRAINING PROGRAM

## 2021-06-01 PROCEDURE — G8417 CALC BMI ABV UP PARAM F/U: HCPCS | Performed by: STUDENT IN AN ORGANIZED HEALTH CARE EDUCATION/TRAINING PROGRAM

## 2021-06-01 RX ORDER — ROSUVASTATIN CALCIUM 10 MG/1
10 TABLET, COATED ORAL DAILY
Qty: 90 TABLET | Refills: 1 | Status: SHIPPED | OUTPATIENT
Start: 2021-06-01 | End: 2021-12-01 | Stop reason: SDUPTHER

## 2021-06-01 RX ORDER — MONTELUKAST SODIUM 10 MG/1
10 TABLET ORAL NIGHTLY
Qty: 30 TABLET | Refills: 5 | Status: SHIPPED | OUTPATIENT
Start: 2021-06-01 | End: 2021-10-25

## 2021-06-01 RX ORDER — AMLODIPINE BESYLATE 10 MG/1
10 TABLET ORAL DAILY
Qty: 90 TABLET | Refills: 1 | Status: SHIPPED | OUTPATIENT
Start: 2021-06-01 | End: 2021-12-01 | Stop reason: SDUPTHER

## 2021-06-01 RX ORDER — TIZANIDINE 4 MG/1
4 TABLET ORAL EVERY 8 HOURS PRN
Qty: 90 TABLET | Refills: 2 | Status: SHIPPED | OUTPATIENT
Start: 2021-06-01 | End: 2022-04-18 | Stop reason: SDUPTHER

## 2021-06-01 RX ORDER — TADALAFIL 10 MG/1
10 TABLET ORAL PRN
Qty: 30 TABLET | Refills: 3 | Status: SHIPPED | OUTPATIENT
Start: 2021-06-01

## 2021-06-01 SDOH — ECONOMIC STABILITY: FOOD INSECURITY: WITHIN THE PAST 12 MONTHS, YOU WORRIED THAT YOUR FOOD WOULD RUN OUT BEFORE YOU GOT MONEY TO BUY MORE.: NEVER TRUE

## 2021-06-01 SDOH — ECONOMIC STABILITY: FOOD INSECURITY: WITHIN THE PAST 12 MONTHS, THE FOOD YOU BOUGHT JUST DIDN'T LAST AND YOU DIDN'T HAVE MONEY TO GET MORE.: NEVER TRUE

## 2021-06-01 ASSESSMENT — ENCOUNTER SYMPTOMS
ABDOMINAL DISTENTION: 0
SHORTNESS OF BREATH: 0
DIARRHEA: 0
NAUSEA: 0
CHEST TIGHTNESS: 0
ABDOMINAL PAIN: 0
COUGH: 0

## 2021-06-01 ASSESSMENT — SOCIAL DETERMINANTS OF HEALTH (SDOH): HOW HARD IS IT FOR YOU TO PAY FOR THE VERY BASICS LIKE FOOD, HOUSING, MEDICAL CARE, AND HEATING?: NOT HARD AT ALL

## 2021-06-01 NOTE — PATIENT INSTRUCTIONS
questions about a medical condition or this instruction, always ask your healthcare professional. [place] [address] any warranty or liability for your use of this information.

## 2021-06-01 NOTE — PROGRESS NOTES
easily. Prior to Visit Medications    Medication Sig Taking? Authorizing Provider   montelukast (SINGULAIR) 10 MG tablet Take 1 tablet by mouth nightly Yes Shamar Plan, DO   rosuvastatin (CRESTOR) 10 MG tablet Take 1 tablet by mouth daily Yes [place] Plan, DO   amLODIPine (NORVASC) 10 MG tablet Take 1 tablet by mouth daily Yes [place] Plan, DO   tiZANidine (ZANAFLEX) 4 MG tablet Take 1 tablet by mouth every 8 hours as needed (back pain) Yes [place] Plan, DO   tadalafil (CIALIS) 10 MG tablet Take 1 tablet by mouth as needed for Erectile Dysfunction Yes [place] Plan, DO   triamcinolone (KENALOG) 0.05 % OINT ointment Apply topically 2 times daily Yes [place] Plan, DO   BIKTARVY -25 MG TABS per tablet  Yes Historical Provider, MD   Blood Pressure KIT 1 kit by Does not apply route daily Yes [place] Plan, DO        Social History     Tobacco Use    Smoking status: Never Smoker    Smokeless tobacco: Never Used   Substance Use Topics    Alcohol use: Not Currently        Vitals:    06/01/21 0906   BP: 136/78   Site: Right Upper Arm   Position: Sitting   Cuff Size: Medium Adult   Pulse: 68   Weight: 245 lb 3.2 oz (111.2 kg)   Height: 6' 1\" (1.854 m)     Estimated body mass index is 32.35 kg/m² as calculated from the following:    Height as of this encounter: 6' 1\" (1.854 m). Weight as of this encounter: 245 lb 3.2 oz (111.2 kg). Physical Exam  Vitals reviewed. Constitutional:       Appearance: Normal appearance. He is obese. HENT:      Head: Normocephalic and atraumatic. Right Ear: Tympanic membrane normal.      Left Ear: Tympanic membrane normal.      Nose: Nose normal.      Mouth/Throat:      Mouth: Mucous membranes are moist.      Pharynx: Oropharynx is clear. Eyes:      Conjunctiva/sclera: Conjunctivae normal.      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 Cardiovascular:      Rate and Rhythm: Normal rate and regular rhythm. Pulses: Normal pulses.    Pulmonary:      Effort: Pulmonary effort is normal.      Breath sounds: Normal breath sounds. Abdominal:      General: Abdomen is flat. Bowel sounds are normal.      Palpations: Abdomen is soft. Musculoskeletal:         General: Normal range of motion. Cervical back: Normal range of motion and neck supple. Skin:     General: Skin is warm and dry. Capillary Refill: Capillary refill takes less than 2 seconds. Findings: No rash. Neurological:      General: No focal deficit present. Mental Status: He is alert and oriented to person, place, and time. Mental status is at baseline. Psychiatric:         Mood and Affect: Mood normal.         ASSESSMENT/PLAN:  1. Essential hypertension: Blood pressure at goal today. Tolerating current regimen well without side effects. Refills given. - amLODIPine (NORVASC) 10 MG tablet; Take 1 tablet by mouth daily  Dispense: 90 tablet; Refill: 1    BP Readings from Last 3 Encounters:   06/01/21 136/78   12/01/20 128/64   09/29/20 (!) 166/104     2. Pure hypercholesterolemia: This is maximally tolerated dose of statin. Tolerating well without myalgias. - rosuvastatin (CRESTOR) 10 MG tablet; Take 1 tablet by mouth daily  Dispense: 90 tablet; Refill: 1    3. Vasculogenic erectile dysfunction, unspecified vasculogenic erectile dysfunction type: Responds well on this dose. No chest pain. - tadalafil (CIALIS) 10 MG tablet; Take 1 tablet by mouth as needed for Erectile Dysfunction  Dispense: 30 tablet; Refill: 3    4. Asymptomatic HIV infection ([facility] [address]  [facility] with [facility]. CD4 counts good. HIV undetectable. 5. Class 1 obesity due to excess calories with serious comorbidity and body mass index (BMI) of 32.0 to 30.6 in adult: Complicates all aspects of patient's care. Weight has been slowly increasing. Wt Readings from Last 3 Encounters:   06/01/21 245 lb 3.2 oz (111.2 kg)   02/12/21 244 lb (110.7 kg)   12/01/20 240 lb (108.9 kg)     6.  Screen for colon cancer  - [facility] (For External Results Only); Future    7. Need for shingles vaccine  Given today. Return in about 6 months (around 12/1/2021) for ONTRAPORT. An electronic signature was used to authenticate this note.     --Gisele Marrufo,  on 6/1/2021 at 9:32 AM

## 2021-06-08 ENCOUNTER — TELEPHONE (OUTPATIENT)
Dept: PRIMARY CARE CLINIC | Age: 61
End: 2021-06-08

## 2021-10-05 DIAGNOSIS — L20.82 FLEXURAL ECZEMA: ICD-10-CM

## 2021-10-05 RX ORDER — TRIAMCINOLONE ACETONIDE OINTMENT USP, 0.05% 0.5 MG/G
OINTMENT TOPICAL 2 TIMES DAILY
Qty: 430 G | Refills: 0 | Status: SHIPPED | OUTPATIENT
Start: 2021-10-05 | End: 2021-12-01 | Stop reason: SDUPTHER

## 2021-10-05 NOTE — TELEPHONE ENCOUNTER
Medication:   Requested Prescriptions     Pending Prescriptions Disp Refills    triamcinolone (KENALOG) 0.05 % OINT ointment [Pharmacy Med Name: TRIAMCINOLONE ACETONIDE 0.05 % Ointment] 430 g 0     Sig: APPLY TOPICALLY 2 TIMES DAILY        Last Filled:  03/12/21    Patient Phone Number: [phone] (home)     Last appt: 6/1/2021   Next appt: 12/1/2021    Last OARRS: No flowsheet data found.

## 2021-10-25 RX ORDER — MONTELUKAST SODIUM 10 MG/1
TABLET ORAL
Qty: 90 TABLET | Refills: 2 | Status: SHIPPED | OUTPATIENT
Start: 2021-10-25 | End: 2021-12-01

## 2021-10-25 NOTE — TELEPHONE ENCOUNTER
Medication:   Requested Prescriptions     Pending Prescriptions Disp Refills    montelukast (SINGULAIR) 10 MG tablet [Pharmacy Med Name: MONTELUKAST SODIUM 10 MG Tablet] 90 tablet      Sig: TAKE 1 TABLET EVERY NIGHT        Last Filled:      Patient Phone Number: [phone] (home)     Last appt: 6/1/2021   Next appt: 12/1/2021    Last OARRS: No flowsheet data found.

## 2021-11-30 NOTE — PROGRESS NOTES
Never used   Substance and Sexual Activity    Alcohol use: Not Currently    Drug use: Not Currently    Sexual activity: Not Currently   Other Topics Concern    Not on file   Social History Narrative    Not on file     Social Determinants of Health     Financial Resource Strain: Low Risk     Difficulty of Paying Living Expenses: Not hard at all   Food Insecurity: No Food Insecurity    Worried About [address] in the Last Year: Never true    [address] in the Last Year: Never true   Transportation Needs:     Lack of Transportation (Medical): Not on file    Lack of Transportation (Non-Medical): Not on file   Physical Activity:     Days of Exercise per Week: Not on file    Minutes of Exercise per Session: Not on file   Stress:     Feeling of Stress : Not on file   Social Connections:     Frequency of Communication with Friends and Family: Not on file    Frequency of Social Gatherings with Friends and Family: Not on file    Attends Yazidi Services: Not on file    Active Member of [address] [place] [address] or Organizations: Not on file    Attends Club or Organization Meetings: Not on file    Marital Status: Not on file   Intimate Partner Violence:     Fear of Current or Ex-Partner: Not on file    Emotionally Abused: Not on file    Physically Abused: Not on file    Sexually Abused: Not on file   Housing Stability:     Unable to Pay for Housing in the Last Year: Not on file    Number of [place] in the Last Year: Not on file    Unstable Housing in the Last Year: Not on file        Family History   Adopted: Yes       There were no vitals filed for this visit. Estimated body mass index is 32.35 kg/m² as calculated from the following:    Height as of 6/1/21: 6' 1\" (1.854 m). Weight as of 6/1/21: 245 lb 3.2 oz (111.2 kg).     Physical Exam    Separate Identifiable issues addressed today:  {Visit Chronic CHP (Optional):43715}    ASSESSMENT/PLAN:  Diagnoses and all orders for this visit:    Essential hypertension    Routine general medical examination at a health care facility    Pure hypercholesterolemia    Class 1 obesity due to excess calories with serious comorbidity and body mass index (BMI) of 32.0 to 32.9 in adult    Asymptomatic HIV infection (HCC)    Screening for diabetes mellitus    Need for shingles vaccine    Screen for colon cancer        No follow-ups on file. An  electronic signature was used to authenticate this note.     --Idalia Mauro, DO on 12/1/2021 at 7:29 AM

## 2021-11-30 NOTE — PATIENT INSTRUCTIONS
(STIs). You can help prevent STIs if you wait to have sex with a new partner (or partners) until you've each been tested for STIs. It also helps if you use condoms (male or female condoms) and if you limit your sex partners to one person who only has sex with you. Vaccines are available for some STIs. · If it's important to you to prevent pregnancy with your partner, talk with your doctor about birth control options that might be best for you. · If you think you may have a problem with alcohol or drug use, talk to your doctor. This includes prescription medicines (such as amphetamines and opioids) and illegal drugs (such as cocaine and methamphetamine). Your doctor can help you figure out what type of treatment is best for you. · Protect your skin from too much sun. When you're outdoors from 10 a.m. to 4 p.m., stay in the shade or cover up with clothing and a hat with a wide brim. Wear sunglasses that block UV rays. Even when it's cloudy, put broad-spectrum sunscreen (SPF 30 or higher) on any exposed skin. · See a dentist one or two times a year for checkups and to have your teeth cleaned. · Wear a seat belt in the car. When should you call for help? Watch closely for changes in your health, and be sure to contact your doctor if you have any problems or symptoms that concern you. Where can you learn more? Go to [email or web address]. org and sign in to your [employer] account. Enter M481 in the [facility] box to learn more about \"Well Visit, Men 48 to 72: Care Instructions. \"     If you do not have an account, please click on the \"Sign Up Now\" link. Current as of: February 11, 2021               Content Version: 13.0  © 3216-0393 [employer]wise, Inc[employer]. Care instructions adapted under license by [facility] ([facility]).  If you have questions about a medical condition or this instruction, always ask your healthcare professional. Amber Martinez disclaims any warranty or liability for your use of this information.

## 2021-12-01 ENCOUNTER — OFFICE VISIT (OUTPATIENT)
Dept: PRIMARY CARE CLINIC | Age: 61
End: 2021-12-01
Payer: MEDICARE

## 2021-12-01 VITALS
WEIGHT: 258.6 LBS | BODY MASS INDEX: 34.27 KG/M2 | DIASTOLIC BLOOD PRESSURE: 72 MMHG | TEMPERATURE: 96.8 F | HEIGHT: 73 IN | SYSTOLIC BLOOD PRESSURE: 137 MMHG | HEART RATE: 66 BPM

## 2021-12-01 DIAGNOSIS — I10 ESSENTIAL HYPERTENSION: ICD-10-CM

## 2021-12-01 DIAGNOSIS — Z13.1 SCREENING FOR DIABETES MELLITUS: ICD-10-CM

## 2021-12-01 DIAGNOSIS — Z23 NEED FOR SHINGLES VACCINE: ICD-10-CM

## 2021-12-01 DIAGNOSIS — R79.9 ABNORMAL FINDING OF BLOOD CHEMISTRY, UNSPECIFIED: ICD-10-CM

## 2021-12-01 DIAGNOSIS — Z12.11 SCREEN FOR COLON CANCER: ICD-10-CM

## 2021-12-01 DIAGNOSIS — Z21 ASYMPTOMATIC HIV INFECTION (HCC): ICD-10-CM

## 2021-12-01 DIAGNOSIS — E78.00 PURE HYPERCHOLESTEROLEMIA: ICD-10-CM

## 2021-12-01 DIAGNOSIS — L20.82 FLEXURAL ECZEMA: ICD-10-CM

## 2021-12-01 DIAGNOSIS — I10 ESSENTIAL HYPERTENSION: Primary | ICD-10-CM

## 2021-12-01 DIAGNOSIS — E66.09 CLASS 1 OBESITY DUE TO EXCESS CALORIES WITH SERIOUS COMORBIDITY AND BODY MASS INDEX (BMI) OF 32.0 TO 32.9 IN ADULT: ICD-10-CM

## 2021-12-01 PROCEDURE — G8484 FLU IMMUNIZE NO ADMIN: HCPCS | Performed by: STUDENT IN AN ORGANIZED HEALTH CARE EDUCATION/TRAINING PROGRAM

## 2021-12-01 PROCEDURE — G8417 CALC BMI ABV UP PARAM F/U: HCPCS | Performed by: STUDENT IN AN ORGANIZED HEALTH CARE EDUCATION/TRAINING PROGRAM

## 2021-12-01 PROCEDURE — 1036F TOBACCO NON-USER: CPT | Performed by: STUDENT IN AN ORGANIZED HEALTH CARE EDUCATION/TRAINING PROGRAM

## 2021-12-01 PROCEDURE — 99214 OFFICE O/P EST MOD 30 MIN: CPT | Performed by: STUDENT IN AN ORGANIZED HEALTH CARE EDUCATION/TRAINING PROGRAM

## 2021-12-01 PROCEDURE — 3017F COLORECTAL CA SCREEN DOC REV: CPT | Performed by: STUDENT IN AN ORGANIZED HEALTH CARE EDUCATION/TRAINING PROGRAM

## 2021-12-01 PROCEDURE — G8427 DOCREV CUR MEDS BY ELIG CLIN: HCPCS | Performed by: STUDENT IN AN ORGANIZED HEALTH CARE EDUCATION/TRAINING PROGRAM

## 2021-12-01 RX ORDER — DORZOLAMIDE HYDROCHLORIDE AND TIMOLOL MALEATE 20; 5 MG/ML; MG/ML
1 SOLUTION/ DROPS OPHTHALMIC 2 TIMES DAILY
COMMUNITY

## 2021-12-01 RX ORDER — AMLODIPINE BESYLATE 10 MG/1
10 TABLET ORAL DAILY
Qty: 90 TABLET | Refills: 1 | Status: SHIPPED | OUTPATIENT
Start: 2021-12-01 | End: 2022-04-18 | Stop reason: SDUPTHER

## 2021-12-01 RX ORDER — ROSUVASTATIN CALCIUM 10 MG/1
10 TABLET, COATED ORAL DAILY
Qty: 90 TABLET | Refills: 1 | Status: SHIPPED | OUTPATIENT
Start: 2021-12-01 | End: 2022-04-18 | Stop reason: SDUPTHER

## 2021-12-01 RX ORDER — TRIAMCINOLONE ACETONIDE OINTMENT USP, 0.05% 0.5 MG/G
OINTMENT TOPICAL 2 TIMES DAILY
Qty: 430 G | Refills: 0 | Status: SHIPPED | OUTPATIENT
Start: 2021-12-01 | End: 2022-04-18 | Stop reason: SDUPTHER

## 2021-12-01 RX ORDER — ZOSTER VACCINE RECOMBINANT, ADJUVANTED 50 MCG/0.5
0.5 KIT INTRAMUSCULAR SEE ADMIN INSTRUCTIONS
Qty: 0.5 ML | Refills: 0 | Status: SHIPPED | OUTPATIENT
Start: 2021-12-01 | End: 2022-05-30

## 2021-12-01 ASSESSMENT — ENCOUNTER SYMPTOMS
COUGH: 0
CHEST TIGHTNESS: 0
SHORTNESS OF BREATH: 0
NAUSEA: 0
ABDOMINAL DISTENTION: 0
DIARRHEA: 0
ABDOMINAL PAIN: 0

## 2021-12-01 NOTE — PROGRESS NOTES
2021     Easton Nolan (:  1960) is a 59 y.o. male, here for evaluation of the following medical concerns:    HPI  Treatment Adherence:   Medication compliance:  compliant most of the time  Diet compliance:  compliant most of the time  Weight trend: stable  Current exercise: no regular exercise  Barriers: none    Hypertension:  Home blood pressure monitoring: No. Patient denies shortness of breath, lightheadedness, blurred vision, peripheral edema and dry cough. Antihypertensive medication side effects: no medication side effects noted. Use of agents associated with hypertension: none. Sodium (mmol/L)   Date Value   2020 143    BUN (mg/dL)   Date Value   2020 11    Glucose (mg/dL)   Date Value   2020 95      Potassium (mmol/L)   Date Value   2020 4.2    CREATININE (mg/dL)   Date Value   2020 1.0         Hyperlipidemia:  No new myalgias or GI upset on rosuvastatin (Crestor). Lab Results   Component Value Date    HDL 51 2020    LDLCALC 66 2020     Lab Results   Component Value Date    ALT 20 2020    AST 17 2020          Review of Systems   Constitutional: Negative for activity change, appetite change, fatigue and unexpected weight change. Eyes: Negative for visual disturbance. Respiratory: Negative for cough, chest tightness and shortness of breath. Cardiovascular: Negative for chest pain, palpitations and leg swelling. Gastrointestinal: Negative for abdominal distention, abdominal pain, diarrhea and nausea. Endocrine: Negative for polydipsia, polyphagia and polyuria. Genitourinary: Negative for decreased urine volume, dysuria and frequency. Musculoskeletal: Negative for gait problem and myalgias. Skin: Negative for rash and wound. Neurological: Negative for dizziness, syncope, weakness, light-headedness and numbness. Hematological: Does not bruise/bleed easily.        Prior to Visit Medications    Medication Sig Taking? Authorizing Provider   dorzolamide-timolol (COSOPT) 22.3-6.8 MG/ML ophthalmic solution 1 drop 2 times daily Yes Historical Provider, MD   zoster recombinant adjuvanted vaccine (SHINGRIX) 50 MCG/0.5ML SUSR injection Inject 0.5 mLs into the muscle See Admin Instructions 1 dose now and repeat in 2-6 months Yes Luna Hoang, DO   amLODIPine (NORVASC) 10 MG tablet Take 1 tablet by mouth daily Yes Luna Hoang DO   rosuvastatin (CRESTOR) 10 MG tablet Take 1 tablet by mouth daily Yes Luna Hoang, DO   triamcinolone (KENALOG) 0.05 % OINT ointment Apply topically 2 times daily Yes Luna Hoang, DO   tiZANidine (ZANAFLEX) 4 MG tablet Take 1 tablet by mouth every 8 hours as needed (back pain) Yes Luna Hoang, DO   tadalafil (CIALIS) 10 MG tablet Take 1 tablet by mouth as needed for Erectile Dysfunction Yes Luna Hoang DO   BIKTARVY -25 MG TABS per tablet  Yes Historical Provider, MD   Blood Pressure KIT 1 kit by Does not apply route daily Yes Luna Hoang DO        Social History     Tobacco Use    Smoking status: Never Smoker    Smokeless tobacco: Never Used   Substance Use Topics    Alcohol use: Not Currently        Vitals:    12/01/21 0919   BP: 137/72   Site: Left Upper Arm   Position: Sitting   Cuff Size: Large Adult   Pulse: 66   Temp: 96.8 °F (36 °C)   TempSrc: Temporal   Weight: 258 lb 9.6 oz (117.3 kg)   Height: 6' 1\" (1.854 m)     Estimated body mass index is 34.12 kg/m² as calculated from the following:    Height as of this encounter: 6' 1\" (1.854 m). Weight as of this encounter: 258 lb 9.6 oz (117.3 kg). Physical Exam  Vitals reviewed. Constitutional:       Appearance: Normal appearance. He is obese. HENT:      Head: Normocephalic and atraumatic. Nose: Nose normal.   Eyes:      Conjunctiva/sclera: Conjunctivae normal.      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 Cardiovascular:      Rate and Rhythm: Normal rate and regular rhythm.       Pulses: Normal pulses. Pulmonary:      Effort: Pulmonary effort is normal.      Breath sounds: Normal breath sounds. Abdominal:      General: Abdomen is flat. Bowel sounds are normal.      Palpations: Abdomen is soft. Musculoskeletal:         General: Normal range of motion. Cervical back: Normal range of motion and neck supple. Right lower leg: No edema. Left lower leg: No edema. Skin:     General: Skin is warm and dry. Capillary Refill: Capillary refill takes less than 2 seconds. Findings: No rash. Neurological:      General: No focal deficit present. Mental Status: He is alert and oriented to person, place, and time. Mental status is at baseline. Psychiatric:         Mood and Affect: Mood normal.         ASSESSMENT/PLAN:  1. Essential hypertension: Blood pressure at goal today. Tolerating current regimen well without side effects. Updating CMP. Refill given. Okay for routine follow-up in 6 months. - Comprehensive Metabolic Panel; Future  - amLODIPine (NORVASC) 10 MG tablet; Take 1 tablet by mouth daily  Dispense: 90 tablet; Refill: 1    2. Pure hypercholesterolemia: Tolerating Crestor well without side effects. Working on following a low-fat diet. Updating lipid profile today. - Lipid, Fasting; Future  - rosuvastatin (CRESTOR) 10 MG tablet; Take 1 tablet by mouth daily  Dispense: 90 tablet; Refill: 1    3. Class 1 obesity due to excess calories with serious comorbidity and body mass index (BMI) of 32.0 to 73.2 in adult: Complicates all aspects of patient's care. Reviewed appropriate lifestyle modifications with patient. 4. [facility] Saw his infectious disease doctor last month. He is on a 6-month schedule. His counts look good. Checking his immunities and will vaccinate as appropriate. - Mumps, Measles, Rubella, and Varicella Zoster, IgG; Future    5. Screening for diabetes mellitus  - Hemoglobin A1C; Future    6.  Need for shingles vaccine  - zoster recombinant adjuvanted vaccine [facility]) 50 MCG/0.5ML SUSR injection; Inject 0.5 mLs into the muscle See Admin Instructions 1 dose now and repeat in 2-6 months  Dispense: 0.5 mL; Refill: 0    7. Screen for colon cancer: Has the kit at home for Colmeredithuard. Encouraged her to do this. Return in about 6 months (around 6/1/2022) for Tagent. An electronic signature was used to authenticate this note.     --Beau Hill, DO on 12/1/2021 at 9:48 AM

## 2021-12-02 LAB
A/G RATIO: 1.5 (ref 1.1–2.2)
ALBUMIN SERPL-MCNC: 4.3 G/DL (ref 3.4–5)
ALP BLD-CCNC: 104 U/L (ref 40–129)
ALT SERPL-CCNC: 16 U/L (ref 10–40)
ANION GAP SERPL CALCULATED.3IONS-SCNC: 12 MMOL/L (ref 3–16)
AST SERPL-CCNC: 13 U/L (ref 15–37)
BILIRUB SERPL-MCNC: 0.5 MG/DL (ref 0–1)
BUN BLDV-MCNC: 8 MG/DL (ref 7–20)
CALCIUM SERPL-MCNC: 9 MG/DL (ref 8.3–10.6)
CHLORIDE BLD-SCNC: 106 MMOL/L (ref 99–110)
CHOLESTEROL, FASTING: 134 MG/DL (ref 0–199)
CO2: 24 MMOL/L (ref 21–32)
CREAT SERPL-MCNC: 1 MG/DL (ref 0.8–1.3)
ESTIMATED AVERAGE GLUCOSE: 134.1 MG/DL
GFR AFRICAN AMERICAN: >60
GFR NON-AFRICAN AMERICAN: >60
GLUCOSE BLD-MCNC: 103 MG/DL (ref 70–99)
HBA1C MFR BLD: 6.3 %
HDLC SERPL-MCNC: 46 MG/DL (ref 40–60)
LDL CHOLESTEROL CALCULATED: 71 MG/DL
MEASLES IMMUNE (IGG): NORMAL
MUMPS AB IGG: NORMAL
POTASSIUM SERPL-SCNC: 4.2 MMOL/L (ref 3.5–5.1)
RUBELLA ANTIBODY IGG: NORMAL
SODIUM BLD-SCNC: 142 MMOL/L (ref 136–145)
TOTAL PROTEIN: 7.1 G/DL (ref 6.4–8.2)
TRIGLYCERIDE, FASTING: 87 MG/DL (ref 0–150)
VARICELLA-ZOSTER VIRUS AB, IGG: NORMAL
VLDLC SERPL CALC-MCNC: 17 MG/DL

## 2022-03-18 PROBLEM — R79.89 ABNORMAL TSH: Status: ACTIVE | Noted: 2017-01-22

## 2022-03-18 PROBLEM — H40.9 GLAUCOMA: Status: ACTIVE | Noted: 2017-01-20

## 2022-03-19 PROBLEM — D12.6 TUBULAR ADENOMA OF COLON: Status: ACTIVE | Noted: 2017-05-23

## 2022-03-19 PROBLEM — E04.9 GOITER: Status: ACTIVE | Noted: 2017-01-20

## 2022-04-18 DIAGNOSIS — L20.82 FLEXURAL ECZEMA: ICD-10-CM

## 2022-04-18 DIAGNOSIS — E78.00 PURE HYPERCHOLESTEROLEMIA: ICD-10-CM

## 2022-04-18 DIAGNOSIS — I10 ESSENTIAL HYPERTENSION: ICD-10-CM

## 2022-04-18 RX ORDER — AMLODIPINE BESYLATE 10 MG/1
10 TABLET ORAL DAILY
Qty: 90 TABLET | Refills: 1 | Status: SHIPPED | OUTPATIENT
Start: 2022-04-18 | End: 2022-06-01 | Stop reason: SDUPTHER

## 2022-04-18 RX ORDER — TRIAMCINOLONE ACETONIDE OINTMENT USP, 0.05% 0.5 MG/G
OINTMENT TOPICAL 2 TIMES DAILY
Qty: 430 G | Refills: 0 | Status: SHIPPED | OUTPATIENT
Start: 2022-04-18 | End: 2022-06-01 | Stop reason: SDUPTHER

## 2022-04-18 RX ORDER — TIZANIDINE 4 MG/1
4 TABLET ORAL EVERY 8 HOURS PRN
Qty: 90 TABLET | Refills: 2 | Status: SHIPPED | OUTPATIENT
Start: 2022-04-18 | End: 2022-06-01 | Stop reason: SDUPTHER

## 2022-04-18 RX ORDER — ROSUVASTATIN CALCIUM 10 MG/1
10 TABLET, COATED ORAL DAILY
Qty: 90 TABLET | Refills: 1 | Status: SHIPPED | OUTPATIENT
Start: 2022-04-18 | End: 2022-06-01 | Stop reason: SDUPTHER

## 2022-04-18 NOTE — TELEPHONE ENCOUNTER
Medication:   Requested Prescriptions     Pending Prescriptions Disp Refills    amLODIPine (NORVASC) 10 MG tablet 90 tablet 1     Sig: Take 1 tablet by mouth daily    tiZANidine (ZANAFLEX) 4 MG tablet 90 tablet 2     Sig: Take 1 tablet by mouth every 8 hours as needed (back pain)    triamcinolone (KENALOG) 0.05 % OINT ointment 430 g 0     Sig: Apply topically 2 times daily    rosuvastatin (CRESTOR) 10 MG tablet 90 tablet 1     Sig: Take 1 tablet by mouth daily        Last Filled:  12/01/21    Patient Phone Number: [phone] (home)     Last appt: 12/1/2021   Next appt: 6/1/2022    Last OARRS: No flowsheet data found.

## 2022-04-18 NOTE — TELEPHONE ENCOUNTER
tiZANidine (ZANAFLEX) 4 MG tablet     amLODIPine (NORVASC) 10 MG tablet     rosuvastatin (CRESTOR) 10 MG tablet     triamcinolone (KENALOG) 0.05 % OINT ointment     Pt needs a refill.         [address] [address]   Phone:  [phone]  Fax:  [phone]

## 2022-05-31 NOTE — PROGRESS NOTES
2022     Tanisha Ayala (:  1960) is a 59 y.o. male, here for evaluation of the following medical concerns:    HPI  {Visit Chronic CHP (Optional):77383}    Review of Systems    Prior to Visit Medications    Medication Sig Taking? Authorizing Provider   amLODIPine (NORVASC) 10 MG tablet Take 1 tablet by mouth daily  Cate Poag, DO   tiZANidine (ZANAFLEX) 4 MG tablet Take 1 tablet by mouth every 8 hours as needed (back pain)  Cate Poag, DO   triamcinolone (KENALOG) 0.05 % OINT ointment Apply topically 2 times daily  [place] Poag, DO   rosuvastatin (CRESTOR) 10 MG tablet Take 1 tablet by mouth daily  Cate Poag, DO   dorzolamide-timolol (COSOPT) 22.3-6.8 MG/ML ophthalmic solution 1 drop 2 times daily  Historical Provider, MD   tadalafil (CIALIS) 10 MG tablet Take 1 tablet by mouth as needed for Erectile Dysfunction  Cate Poag, DO   BIKTARVY -25 MG TABS per tablet   Historical Provider, MD   Blood Pressure KIT 1 kit by Does not apply route daily  Cate Poag, DO        Social History     Tobacco Use    Smoking status: Never Smoker    Smokeless tobacco: Never Used   Substance Use Topics    Alcohol use: Not Currently        There were no vitals filed for this visit. Estimated body mass index is 34.12 kg/m² as calculated from the following:    Height as of 21: 6' 1\" (1.854 m). Weight as of 21: 258 lb 9.6 oz (117.3 kg). Physical Exam    ASSESSMENT/PLAN:  1. Essential hypertension  ***    2. Pure hypercholesterolemia  ***    3. Class 1 obesity due to excess calories with serious comorbidity and body mass index (BMI) of 34.0 to 34.9 in adult  ***    4. Asymptomatic HIV infection (HCC)  ***    5. Screen for colon cancer  ***    6. Screening for malignant neoplasm of prostate  ***    7. Need for shingles vaccine  ***    8. Need for vaccination for meningococcus  ***      No follow-ups on file. An electronic signature was used to authenticate this note.     --Cate Casillas, DO on 2022 at 5:35 PM

## 2022-06-01 ENCOUNTER — OFFICE VISIT (OUTPATIENT)
Dept: PRIMARY CARE CLINIC | Age: 62
End: 2022-06-01
Payer: MEDICARE

## 2022-06-01 VITALS
BODY MASS INDEX: 33.34 KG/M2 | TEMPERATURE: 97.9 F | DIASTOLIC BLOOD PRESSURE: 84 MMHG | SYSTOLIC BLOOD PRESSURE: 136 MMHG | WEIGHT: 251.6 LBS | HEART RATE: 78 BPM | HEIGHT: 73 IN

## 2022-06-01 DIAGNOSIS — Z12.11 SCREEN FOR COLON CANCER: ICD-10-CM

## 2022-06-01 DIAGNOSIS — Z00.00 MEDICARE ANNUAL WELLNESS VISIT, SUBSEQUENT: Primary | ICD-10-CM

## 2022-06-01 DIAGNOSIS — Z21 ASYMPTOMATIC HIV INFECTION (HCC): ICD-10-CM

## 2022-06-01 DIAGNOSIS — Z23 NEED FOR VACCINATION FOR MENINGOCOCCUS: ICD-10-CM

## 2022-06-01 DIAGNOSIS — L20.82 FLEXURAL ECZEMA: ICD-10-CM

## 2022-06-01 DIAGNOSIS — E78.00 PURE HYPERCHOLESTEROLEMIA: ICD-10-CM

## 2022-06-01 DIAGNOSIS — E66.09 CLASS 1 OBESITY DUE TO EXCESS CALORIES WITH SERIOUS COMORBIDITY AND BODY MASS INDEX (BMI) OF 34.0 TO 34.9 IN ADULT: ICD-10-CM

## 2022-06-01 DIAGNOSIS — Z12.5 SCREENING FOR MALIGNANT NEOPLASM OF PROSTATE: ICD-10-CM

## 2022-06-01 DIAGNOSIS — I10 ESSENTIAL HYPERTENSION: ICD-10-CM

## 2022-06-01 LAB — PROSTATE SPECIFIC ANTIGEN: 0.72 NG/ML (ref 0–4)

## 2022-06-01 PROCEDURE — 99214 OFFICE O/P EST MOD 30 MIN: CPT | Performed by: STUDENT IN AN ORGANIZED HEALTH CARE EDUCATION/TRAINING PROGRAM

## 2022-06-01 PROCEDURE — 3017F COLORECTAL CA SCREEN DOC REV: CPT | Performed by: STUDENT IN AN ORGANIZED HEALTH CARE EDUCATION/TRAINING PROGRAM

## 2022-06-01 PROCEDURE — G0439 PPPS, SUBSEQ VISIT: HCPCS | Performed by: STUDENT IN AN ORGANIZED HEALTH CARE EDUCATION/TRAINING PROGRAM

## 2022-06-01 PROCEDURE — G8417 CALC BMI ABV UP PARAM F/U: HCPCS | Performed by: STUDENT IN AN ORGANIZED HEALTH CARE EDUCATION/TRAINING PROGRAM

## 2022-06-01 PROCEDURE — G8427 DOCREV CUR MEDS BY ELIG CLIN: HCPCS | Performed by: STUDENT IN AN ORGANIZED HEALTH CARE EDUCATION/TRAINING PROGRAM

## 2022-06-01 PROCEDURE — 1036F TOBACCO NON-USER: CPT | Performed by: STUDENT IN AN ORGANIZED HEALTH CARE EDUCATION/TRAINING PROGRAM

## 2022-06-01 RX ORDER — AMLODIPINE BESYLATE 10 MG/1
10 TABLET ORAL DAILY
Qty: 90 TABLET | Refills: 1 | Status: SHIPPED | OUTPATIENT
Start: 2022-06-01 | End: 2022-08-04

## 2022-06-01 RX ORDER — ROSUVASTATIN CALCIUM 10 MG/1
10 TABLET, COATED ORAL DAILY
Qty: 90 TABLET | Refills: 1 | Status: SHIPPED | OUTPATIENT
Start: 2022-06-01 | End: 2022-08-04

## 2022-06-01 RX ORDER — MULTIVIT WITH MINERALS/LUTEIN
1000 TABLET ORAL DAILY
COMMUNITY

## 2022-06-01 RX ORDER — BRIMONIDINE TARTRATE 2 MG/ML
1 SOLUTION/ DROPS OPHTHALMIC 2 TIMES DAILY
COMMUNITY

## 2022-06-01 RX ORDER — TRIAMCINOLONE ACETONIDE OINTMENT USP, 0.05% 0.5 MG/G
OINTMENT TOPICAL 2 TIMES DAILY
Qty: 430 G | Refills: 0 | Status: SHIPPED | OUTPATIENT
Start: 2022-06-01 | End: 2022-06-20 | Stop reason: SDUPTHER

## 2022-06-01 RX ORDER — KETOCONAZOLE 20 MG/ML
SHAMPOO TOPICAL
COMMUNITY

## 2022-06-01 RX ORDER — TIZANIDINE 4 MG/1
4 TABLET ORAL EVERY 8 HOURS PRN
Qty: 90 TABLET | Refills: 2 | Status: SHIPPED | OUTPATIENT
Start: 2022-06-01 | End: 2022-06-20

## 2022-06-01 RX ORDER — MOMETASONE FUROATE 50 UG/1
2 SPRAY, METERED NASAL DAILY
COMMUNITY

## 2022-06-01 ASSESSMENT — PATIENT HEALTH QUESTIONNAIRE - PHQ9
2. FEELING DOWN, DEPRESSED OR HOPELESS: 0
SUM OF ALL RESPONSES TO PHQ9 QUESTIONS 1 & 2: 0
1. LITTLE INTEREST OR PLEASURE IN DOING THINGS: 0
SUM OF ALL RESPONSES TO PHQ QUESTIONS 1-9: 0

## 2022-06-01 ASSESSMENT — LIFESTYLE VARIABLES
HOW MANY STANDARD DRINKS CONTAINING ALCOHOL DO YOU HAVE ON A TYPICAL DAY: 1 OR 2
HOW OFTEN DO YOU HAVE A DRINK CONTAINING ALCOHOL: MONTHLY OR LESS

## 2022-06-01 NOTE — PROGRESS NOTES
Medicare Annual Wellness Visit    Levi Rojo is here for Medicare AWV, Hypertension, and Hyperlipidemia    Assessment & Plan   Medicare annual wellness visit, subsequent: Recommendations for Preventive Services Due: see orders and patient instructions/AVS. Recommended screening schedule for the next 5-10 years is provided to the patient in written form: see Patient Instructions/AVS.    Essential hypertension: Blood pressure goal today. Tolerating current regimen well without side effects. Refills given. Okay for routine follow-up in 6 months. -     amLODIPine (NORVASC) 10 MG tablet; Take 1 tablet by mouth daily, Disp-90 tablet, R-1Normal    Pure hypercholesterolemia: Compliant with statin. Given information on the Mediterranean diet. Follow-up for repeat lipid panel 6 months. -     rosuvastatin (CRESTOR) 10 MG tablet; Take 1 tablet by mouth daily, Disp-90 tablet, R-1Normal    Class 1 obesity due to excess calories with serious comorbidity and body mass index (BMI) of 34.0 to 61.1 in adult: Complicates all aspects of patient's care. Reviewed appropriate lifestyle modifications with patient. Asymptomatic HIV infection ([facility]): Compliant with his antiretroviral therapy. Flexural eczema: Uses intermittently. Responds well to treatment. -     triamcinolone (KENALOG) 0.05 % OINT ointment; Apply topically 2 times daily, Topical, 2 TIMES DAILY Starting Wed 6/1/2022, Disp-430 g, R-0, Normal    Screen for colon cancer: Has Cologuard at home. Encouraged him to complete the kit. Screening for malignant neoplasm of prostate  -     PSA, Prostatic Specific Antigen; Future           Return for Medicare Annual Wellness Visit in 1 year. Subjective   The following acute and/or chronic problems were also addressed today:    Hypertension  Patient is here for follow-up of elevated blood pressure. He is not exercising and is not adherent to a low-salt diet. Blood pressure is well controlled at home.  Cardiac symptoms: none. Patient denies chest pain, claudication, dyspnea, fatigue, irregular heart beat, lower extremity edema and near-syncope. Cardiovascular risk factors: advanced age (older than 54 for men, 72 for women), dyslipidemia, hypertension, male gender and sedentary lifestyle. Use of agents associated with hypertension: none. History of target organ damage: none. Hyperlipidemia:  No new myalgias or GI upset on rosuvastatin (Crestor). Medication compliance: compliant most of the time. Patient is  following a low fat, low cholesterol diet. He is not exercising regularly. Lab Results   Component Value Date    HDL 46 12/01/2021    LDLCALC 71 12/01/2021     Lab Results   Component Value Date    ALT 16 12/01/2021    AST 13 (L) 12/01/2021          Patient's complete Health Risk Assessment and screening values have been reviewed and are found in Flowsheets. The following problems were reviewed today and where indicated follow up appointments were made and/or referrals ordered.     Positive Risk Factor Screenings with Interventions:               General Health and ACP:  General  In general, how would you say your health is?: Excellent  In the past 7 days, have you experienced any of the following: New or Increased Pain, New or Increased Fatigue, Loneliness, Social Isolation, Stress or Anger?: No  Do you get the social and emotional support that you need?: Yes  Do you have a Living Will?: (!) No    Advance Directives     Power of  Living Will ACP-Advance Directive ACP-Power of     Not on File Not on File Not on File Not on File      General Health Risk Interventions:  · No Living Will: Advance Care Planning addressed with patient today    Health Habits/Nutrition:     Physical Activity: Sufficiently Active    Days of Exercise per Week: 4 days    Minutes of Exercise per Session: 150+ min     Have you lost any weight without trying in the past 3 months?: No  Body mass index: (!) 33.19  Have you seen the dentist within the past year?: Yes    Health Habits/Nutrition Interventions:  · Inadequate physical activity:  educational materials provided to promote increased physical activity    Hearing/Vision:  Do you or your family notice any trouble with your hearing that hasn't been managed with hearing aids?: No  Do you have difficulty driving, watching TV, or doing any of your daily activities because of your eyesight?: (!) Yes  Have you had an eye exam within the past year?: Yes  No exam data present    Hearing/Vision Interventions:  · Vision concerns:  patient encouraged to make appointment with his/her eye specialist     ADLs:  In the past 7 days, did you need help from others to perform any of the following everyday activities: Eating, dressing, grooming, bathing, toileting, or walking/balance?: No  In the past 7 days, did you need help from others to take care of any of the following: Laundry, housekeeping, banking/finances, shopping, telephone use, food preparation, transportation, or taking medications?: (!) Yes  Select all that apply: (!) Transportation    ADL Interventions:  · Referred for Care Coordination          Objective   Vitals:    06/01/22 0918 06/01/22 0928   BP: (!) 156/84 136/84   Pulse: 78    Temp: 97.9 °F (36.6 °C)    TempSrc: Axillary    Weight: 251 lb 9.6 oz (114.1 kg)    Height: 6' 1\" (1.854 m)       Body mass index is 33.19 kg/m². Physical Exam  Vitals reviewed. Constitutional:       Appearance: Normal appearance. He is obese. HENT:      Head: Normocephalic and atraumatic. Nose: Nose normal.   Eyes:      Conjunctiva/sclera: Conjunctivae normal.      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 Cardiovascular:      Rate and Rhythm: Normal rate and regular rhythm. Pulses: Normal pulses. Pulmonary:      Effort: Pulmonary effort is normal.      Breath sounds: Normal breath sounds. Abdominal:      General: Abdomen is flat.  Bowel sounds are normal. Palpations: Abdomen is soft. Musculoskeletal:         General: Normal range of motion. Cervical back: Normal range of motion and neck supple. Right lower leg: No edema. Left lower leg: No edema. Skin:     General: Skin is warm and dry. Capillary Refill: Capillary refill takes less than 2 seconds. Findings: No rash. Neurological:      General: No focal deficit present. Mental Status: He is alert and oriented to person, place, and time. Mental status is at baseline. Psychiatric:         Mood and Affect: Mood normal.       No Known Allergies  Prior to Visit Medications    Medication Sig Taking?  Authorizing Provider   brimonidine (ALPHAGAN) 0.2 % ophthalmic solution 1 drop in the morning and at bedtime Yes Historical Provider, MD   vitamin E 1000 units capsule Take 1,000 Units by mouth daily Yes Historical Provider, MD   mometasone (NASONEX) 50 MCG/ACT nasal spray 2 sprays by Nasal route daily Yes Historical Provider, MD   ketoconazole (NIZORAL) 2 % shampoo Apply topically Yes Historical Provider, MD   triamcinolone (KENALOG) 0.05 % OINT ointment Apply topically 2 times daily Yes Alexx Caba, DO   amLODIPine (NORVASC) 10 MG tablet Take 1 tablet by mouth daily Yes Alexx Caba, DO   rosuvastatin (CRESTOR) 10 MG tablet Take 1 tablet by mouth daily Yes Alexx Caba, DO   tiZANidine (ZANAFLEX) 4 MG tablet Take 1 tablet by mouth every 8 hours as needed (back pain) Yes Alexx Caba, DO   tadalafil (CIALIS) 10 MG tablet Take 1 tablet by mouth as needed for Erectile Dysfunction Yes Alexx Caba DO   BIKTARVY -25 MG TABS per tablet  Yes Historical Provider, MD   Blood Pressure KIT 1 kit by Does not apply route daily Yes Alexx Caba DO   ascorbic acid (VITAMIN C) 100 MG tablet Take 100 mg by mouth daily  Historical Provider, MD   dorzolamide-timolol (COSOPT) 22.3-6.8 MG/ML ophthalmic solution 1 drop 2 times daily  Patient not taking: Reported on 6/1/2022  Historical Provider, MD Mohit Bell (Including outside providers/suppliers regularly involved in providing care):   Patient Care Team:  Cate Casillas DO as PCP - General (Family Medicine)  Cate Casillas DO as PCP - [facility] Empaneled Provider     Reviewed and updated this visit:  Tobacco  Allergies  Meds  Problems  Med Hx  Surg Hx  Soc Hx  Fam Hx

## 2022-06-01 NOTE — PATIENT INSTRUCTIONS
Personalized Preventive Plan for Delmar Melchor - 6/1/2022  Medicare offers a range of preventive health benefits. Some of the tests and screenings are paid in full while other may be subject to a deductible, co-insurance, and/or copay. Some of these benefits include a comprehensive review of your medical history including lifestyle, illnesses that may run in your family, and various assessments and screenings as appropriate. After reviewing your medical record and screening and assessments performed today your provider may have ordered immunizations, labs, imaging, and/or referrals for you. A list of these orders (if applicable) as well as your Preventive Care list are included within your After Visit Summary for your review. Other Preventive Recommendations:    · A preventive eye exam performed by an eye specialist is recommended every 1-2 years to screen for glaucoma; cataracts, macular degeneration, and other eye disorders. · A preventive dental visit is recommended every 6 months. · Try to get at least 150 minutes of exercise per week or 10,000 steps per day on a pedometer . · Order or download the FREE \"Exercise & Physical Activity: Your Everyday Guide\" from [employer]. Call 0-362.263.1867 or search [employer] online. · You need 2857-2797 mg of calcium and 3540-4161 IU of vitamin D per day. It is possible to meet your calcium requirement with diet alone, but a vitamin D supplement is usually necessary to meet this goal.  · When exposed to the sun, use a sunscreen that protects against both UVA and UVB radiation with an SPF of 30 or greater. Reapply every 2 to 3 hours or after sweating, drying off with a towel, or swimming. · Always wear a seat belt when traveling in a car. Always wear a helmet when riding a bicycle or motorcycle.

## 2022-06-20 DIAGNOSIS — L20.82 FLEXURAL ECZEMA: ICD-10-CM

## 2022-06-20 RX ORDER — TIZANIDINE 4 MG/1
4 TABLET ORAL EVERY 8 HOURS PRN
Qty: 90 TABLET | Refills: 2 | Status: SHIPPED | OUTPATIENT
Start: 2022-06-20

## 2022-06-20 RX ORDER — TRIAMCINOLONE ACETONIDE OINTMENT USP, 0.05% 0.5 MG/G
OINTMENT TOPICAL 2 TIMES DAILY
Qty: 430 G | Refills: 0 | Status: SHIPPED | OUTPATIENT
Start: 2022-06-20 | End: 2022-06-29

## 2022-06-20 NOTE — TELEPHONE ENCOUNTER
Medication:   Requested Prescriptions     Pending Prescriptions Disp Refills    tiZANidine (ZANAFLEX) 4 MG tablet [Pharmacy Med Name: TIZANIDINE HYDROCHLORIDE 4 MG Tablet] 90 tablet 2     Sig: TAKE 1 TABLET BY MOUTH EVERY 8 HOURS AS NEEDED (BACK PAIN)    triamcinolone (KENALOG) 0.05 % OINT ointment 430 g 0     Sig: Apply topically 2 times daily        Last Filled:  Reported     Patient Phone Number: [phone] (home)     Last appt: 6/1/2022   Next appt: Visit date not found    Last OARRS: No flowsheet data found.

## 2022-06-28 ENCOUNTER — TELEPHONE (OUTPATIENT)
Dept: PRIMARY CARE CLINIC | Age: 62
End: 2022-06-28

## 2022-06-28 NOTE — TELEPHONE ENCOUNTER
triamcinolone (KENALOG) 0.05 % OINT ointment     [facility] ([facility] [address] [phone] - F [phone] (Pharmacy    Pharmacy call in and the medication above is not cover by insurance but the (0.1%) is cover. Needs new script with the correct (0.1%).

## 2022-08-03 DIAGNOSIS — E78.00 PURE HYPERCHOLESTEROLEMIA: ICD-10-CM

## 2022-08-03 DIAGNOSIS — I10 ESSENTIAL HYPERTENSION: ICD-10-CM

## 2022-08-04 RX ORDER — ROSUVASTATIN CALCIUM 10 MG/1
TABLET, COATED ORAL
Qty: 90 TABLET | Refills: 1 | Status: SHIPPED | OUTPATIENT
Start: 2022-08-04

## 2022-08-04 RX ORDER — AMLODIPINE BESYLATE 10 MG/1
TABLET ORAL
Qty: 90 TABLET | Refills: 1 | Status: SHIPPED | OUTPATIENT
Start: 2022-08-04

## 2022-08-04 NOTE — TELEPHONE ENCOUNTER
Medication:   Requested Prescriptions     Pending Prescriptions Disp Refills    amLODIPine (NORVASC) 10 MG tablet [Pharmacy Med Name: AMLODIPINE BESYLATE 10 MG Tablet] 90 tablet 1     Sig: TAKE 1 TABLET EVERY DAY    rosuvastatin (CRESTOR) 10 MG tablet [Pharmacy Med Name: ROSUVASTATIN CALCIUM 10 MG Tablet] 90 tablet 1     Sig: TAKE 1 TABLET EVERY DAY        Last Filled:  06/01/22,06/01/22    Patient Phone Number: [phone] (home)     Last appt: 6/1/2022     Return for Medicare Annual Wellness Visit in 1 year. Next appt: Visit date not found    Last OARRS: No flowsheet data found.

## 2022-08-15 ENCOUNTER — TELEPHONE (OUTPATIENT)
Dept: PRIMARY CARE CLINIC | Age: 62
End: 2022-08-15

## 2022-08-31 NOTE — TELEPHONE ENCOUNTER
[place] for patient to call and get a referral for a colonoscopy to be done or Cologuard ordered)

## 2022-11-08 NOTE — TELEPHONE ENCOUNTER
Medication:   Requested Prescriptions     Pending Prescriptions Disp Refills    triamcinolone (KENALOG) 0.1 % ointment [Pharmacy Med Name: TRIAMCINOLONE ACETONIDE 0.1 % Ointment] 80 g 0     Sig: APPLY TOPICALLY 2 TIMES DAILY FOR 7 DAYS        Last Filled:  Discontinued by: Nilsa Miller DO on 6/29/2022 07:36    Patient Phone Number: [phone] (home)     Last appt: 6/1/2022   Next appt: Visit date not found    Last OARRS: No flowsheet data found.

## 2023-01-25 ENCOUNTER — TELEPHONE (OUTPATIENT)
Dept: PRIMARY CARE CLINIC | Age: 63
End: 2023-01-25

## 2023-01-25 DIAGNOSIS — Z12.11 SCREEN FOR COLON CANCER: Primary | ICD-10-CM

## 2023-01-30 RX ORDER — MONTELUKAST SODIUM 10 MG/1
TABLET ORAL
Qty: 90 TABLET | Refills: 2 | Status: SHIPPED | OUTPATIENT
Start: 2023-01-30

## 2023-01-30 NOTE — TELEPHONE ENCOUNTER
Medication:   Requested Prescriptions     Pending Prescriptions Disp Refills    montelukast (SINGULAIR) 10 MG tablet [Pharmacy Med Name: MONTELUKAST SODIUM 10 MG Tablet] 90 tablet 2     Sig: TAKE 1 TABLET EVERY NIGHT        Last Filled:  Discontinued by: Ceci Nguyễn DO on 10/25/2021     Patient Phone Number: [phone] (home)     Last appt: 6/1/2022   Next appt: 6/2/2023    Last OARRS: No flowsheet data found.

## 2023-02-03 RX ORDER — TIZANIDINE 4 MG/1
4 TABLET ORAL EVERY 8 HOURS PRN
Qty: 90 TABLET | Refills: 2 | Status: SHIPPED | OUTPATIENT
Start: 2023-02-03

## 2023-02-03 NOTE — TELEPHONE ENCOUNTER
Medication:   Requested Prescriptions     Pending Prescriptions Disp Refills    tiZANidine (ZANAFLEX) 4 MG tablet [Pharmacy Med Name: TIZANIDINE HYDROCHLORIDE 4 MG Tablet] 90 tablet 2     Sig: TAKE 1 TABLET BY MOUTH EVERY 8 HOURS AS NEEDED (BACK PAIN)    triamcinolone (KENALOG) 0.1 % ointment [Pharmacy Med Name: TRIAMCINOLONE ACETONIDE 0.1 % Ointment] 80 g 0     Sig: APPLY TOPICALLY 2 TIMES DAILY FOR 7 DAYS        Last Filled:  06/20/22    Patient Phone Number: [phone] (home)     Last appt: 6/1/2022   Next appt: 6/2/2023    Last OARRS: No flowsheet data found.

## 2023-03-13 NOTE — PROGRESS NOTES
Preoperative Consultation      Seng Wilkinson  YOB: 1960    Date of Service:  3/14/2023    Vitals:    03/14/23 1317 03/14/23 1320   BP: (!) 155/78 120/74   Pulse: 88    Temp: 98.7 °F (37.1 °C)    TempSrc: Temporal    Weight: 234 lb 3.2 oz (106.2 kg)    Height: 6' 1\" (1.854 m)       Wt Readings from Last 2 Encounters:   03/14/23 234 lb 3.2 oz (106.2 kg)   06/01/22 251 lb 9.6 oz (114.1 kg)     BP Readings from Last 3 Encounters:   03/14/23 120/74   06/01/22 136/84   12/01/21 137/72        Chief Complaint   Patient presents with    Pre-op Exam     Phacoemulsification with Intraocular Lens Implant on 03/23/23 by Yolette Blanco at [facility]      No Known Allergies  Outpatient Medications Marked as Taking for the 3/14/23 encounter (Office Visit) with Ronnell Tatum,    Medication Sig Dispense Refill    Omega-3 Fatty Acids (FISH OIL) 500 MG CAPS Take by mouth 4 times daily      Multiple Vitamins-Minerals (THERAPEUTIC MULTIVITAMIN-MINERALS) tablet Take 1 tablet by mouth daily      BLACK CURRANT SEED OIL PO Take by mouth      tiZANidine (ZANAFLEX) 4 MG tablet TAKE 1 TABLET BY MOUTH EVERY 8 HOURS AS NEEDED (BACK PAIN) 90 tablet 2    montelukast (SINGULAIR) 10 MG tablet TAKE 1 TABLET EVERY NIGHT 90 tablet 2    amLODIPine (NORVASC) 10 MG tablet TAKE 1 TABLET EVERY DAY 90 tablet 1    rosuvastatin (CRESTOR) 10 MG tablet TAKE 1 TABLET EVERY DAY 90 tablet 1    brimonidine (ALPHAGAN) 0.2 % ophthalmic solution 1 drop in the morning and at bedtime      vitamin E 1000 units capsule Take 1,000 Units by mouth daily      mometasone (NASONEX) 50 MCG/ACT nasal spray 2 sprays by Nasal route daily      ketoconazole (NIZORAL) 2 % shampoo Apply topically      ascorbic acid (VITAMIN C) 100 MG tablet Take 100 mg by mouth daily      tadalafil (CIALIS) 10 MG tablet Take 1 tablet by mouth as needed for Erectile Dysfunction 30 tablet 3    BIKTARVY -25 MG TABS per tablet       Blood Pressure KIT 1 kit by Does not apply route daily 1 kit 0       This patient presents to the office today for a preoperative consultation at the request of surgeon, Dr. Clement Merlos, who plans on performing cataract removal with intraocular lens implant, R eye on March 23 at [facility]. The current problem began several months ago, and symptoms have been worsening with time. Conservative therapy: N/A. Planned anesthesia: Topical anesthesia   Known anesthesia problems: None   Bleeding risk: No recent or remote history of abnormal bleeding  Personal or FH of DVT/PE: No    Patient objection to receiving blood products: No    Patient Active Problem List   Diagnosis    Asymptomatic HIV infection ([facility]    Essential hypertension    Pure hypercholesterolemia    Class 1 obesity due to excess calories with serious comorbidity and body mass index (BMI) of 32.0 to 32.9 in adult    Vasculogenic erectile dysfunction       Past Medical History:   Diagnosis Date    Glaucoma     High blood cholesterol     High cholesterol     HIV (human immunodeficiency virus infection) ([facility]      No past surgical history on file.   Family History   Adopted: Yes     Social History     Socioeconomic History    Marital status: Single     Spouse name: Not on file    Number of children: Not on file    Years of education: Not on file    Highest education level: Not on file   Occupational History    Not on file   Tobacco Use    Smoking status: Never    Smokeless tobacco: Never   Vaping Use    Vaping Use: Never used   Substance and Sexual Activity    Alcohol use: Not Currently    Drug use: Not Currently    Sexual activity: Not Currently   Other Topics Concern    Not on file   Social History Narrative    Not on file     Social Determinants of Health     Financial Resource Strain: Low Risk     Difficulty of Paying Living Expenses: Not hard at all   Food Insecurity: No Food Insecurity    Worried About Running Out of Food in the Last Year: Never true    [address] in the Last Year: Never true Transportation Needs: Unknown    Lack of Transportation (Medical): Not on file    Lack of Transportation (Non-Medical): No   Physical Activity: Sufficiently Active    Days of Exercise per Week: 4 days    Minutes of Exercise per Session: 150+ min   Stress: Not on file   Social Connections: Not on file   Intimate Partner Violence: Not on file   Housing Stability: Unknown    Unable to Pay for Housing in the Last Year: Not on file    Number of Places Lived in the Last Year: Not on file    Unstable Housing in the Last Year: No     Review of Systems   Constitutional:  Negative for fatigue. Eyes:  Negative for visual disturbance. Respiratory:  Negative for cough, chest tightness and shortness of breath. Cardiovascular:  Negative for chest pain, palpitations and leg swelling. Endocrine: Negative for polydipsia, polyphagia and polyuria. Genitourinary:  Negative for frequency. Skin:  Negative for rash. Neurological:  Negative for dizziness, syncope, weakness and light-headedness. Physical Exam   Constitutional: He is oriented to person, place, and time. He appears well-developed and well-nourished. No distress. HENT:   Head: Normocephalic and atraumatic. Mouth/Throat: Uvula is midline, oropharynx is clear and moist and mucous membranes are normal.   Eyes: Conjunctivae and EOM are normal. Pupils are equal, round, and reactive to light. Neck: Trachea normal and normal range of motion. Neck supple. No JVD present. Carotid bruit is not present. No mass and no thyromegaly present. Cardiovascular: Normal rate, regular rhythm, normal heart sounds and intact distal pulses. Exam reveals no gallop and no friction rub. No murmur heard. Pulmonary/Chest: Effort normal and breath sounds normal. No respiratory distress. He has no wheezes. He has no rales. Abdominal: Soft. Normal aorta and bowel sounds are normal. He exhibits no distension and no mass. There is no hepatosplenomegaly. No tenderness. Musculoskeletal: He exhibits no edema and no tenderness. Neurological: He is alert and oriented to person, place, and time. He has normal strength. No cranial nerve deficit or sensory deficit. Coordination and gait normal.   Skin: Skin is warm and dry. No rash noted. No erythema. Psychiatric: He has a normal mood and affect. His behavior is normal.     EKG Interpretation:  N/A. Lab Review N/A. Assessment:       57 y.o. patient with planned surgery as above. Known risk factors for perioperative complications: Hypertension, HIV  Current medications which may produce withdrawal symptoms if withheld perioperatively: none      Plan:     1. Preoperative workup as follows: none  2. Change in medication regimen before surgery: None  3. Prophylaxis for cardiac events with perioperative beta-blockers: Not indicated  4. Deep vein thrombosis prophylaxis:  N/A  5.  No contraindications to planned surgery

## 2023-03-13 NOTE — PATIENT INSTRUCTIONS
Patient Education        Learning About How to Prepare for Surgery  How can you prepare before surgery? You can do some things that will help you safely prepare for surgery. Understand exactly what surgery is planned. You should know the risks, benefits, and other options. Tell your doctors ALL the medicines, vitamins, supplements, and herbal remedies you take. Some of these can increase the risk of bleeding. Or they may interact with anesthesia. Follow your doctor's instructions about which medicines to take or stop before your surgery. You may need to stop taking some medicines a week or more before surgery. If you take aspirin or some other blood thinner, be sure to talk to your doctor. Follow any other instructions your doctor gave you. If you have an advance directive, let your doctor know, and bring a copy to the hospital.   It may include a living will and a durable power of  for health care. It lets your doctor and loved ones know your health care wishes. If you don't have one, you may want to prepare one. How can you prepare on the day of surgery? Here are some tips about what to do at home before you leave for your surgery. If your doctor told you to take your medicines on the day of surgery, take them with only a sip of water. Follow the instructions about when to stop eating and drinking. If you don't, your surgery may be canceled. Follow your doctor's instructions about when to bathe or shower before your surgery. Don't use lotions, perfumes, deodorants, or nail polish. Do not shave the surgical site yourself. Take off all jewelry and piercings. Take out contact lenses, if you wear them. Have a picture ID ready to take with you. Your ID will be checked before your surgery. Know when to call your doctor. Call your doctor if you:  Become ill before surgery. Need to reschedule. Have changed your mind about having the surgery. What happens before surgery?   Here are some things you can expect to happen before your surgery. Your picture ID will be checked. The area of your body that needs surgery is often marked to make sure there are no errors. You will be kept comfortable and safe by your anesthesia provider. The anesthesia may make you sleep. Or it may just numb the area being worked on. What happens when you are ready to go home? Be sure you have someone drive you home. Anesthesia and pain medicine make it unsafe for you to drive. You will get instructions about recovering from your surgery. This is called a discharge plan. It will cover things like diet, wound care, follow-up care, driving, and getting back to your normal routine. Follow-up care is a key part of your treatment and safety. Be sure to make and go to all appointments, and call your doctor if you are having problems. It's also a good idea to know your test results and keep a list of the medicines you take. Where can you learn more? Go to http://www.woods.com/ and enter Q270 to learn more about \"Learning About How to Prepare for Surgery. \"  Current as of: June 6, 2022               Content Version: 13.5  © 9139-1683 [employer]. Care instructions adapted under license by [facility] ([facility]). If you have questions about a medical condition or this instruction, always ask your healthcare professional. [address] any warranty or liability for your use of this information.

## 2023-03-14 ENCOUNTER — OFFICE VISIT (OUTPATIENT)
Dept: PRIMARY CARE CLINIC | Age: 63
End: 2023-03-14
Payer: MEDICARE

## 2023-03-14 VITALS
WEIGHT: 234.2 LBS | HEIGHT: 73 IN | TEMPERATURE: 98.7 F | DIASTOLIC BLOOD PRESSURE: 74 MMHG | BODY MASS INDEX: 31.04 KG/M2 | HEART RATE: 88 BPM | SYSTOLIC BLOOD PRESSURE: 120 MMHG

## 2023-03-14 DIAGNOSIS — Z01.818 PREOP GENERAL PHYSICAL EXAM: Primary | ICD-10-CM

## 2023-03-14 DIAGNOSIS — H25.012 CORTICAL AGE-RELATED CATARACT OF LEFT EYE: ICD-10-CM

## 2023-03-14 DIAGNOSIS — Z21 ASYMPTOMATIC HIV INFECTION (HCC): ICD-10-CM

## 2023-03-14 PROCEDURE — G8484 FLU IMMUNIZE NO ADMIN: HCPCS | Performed by: STUDENT IN AN ORGANIZED HEALTH CARE EDUCATION/TRAINING PROGRAM

## 2023-03-14 PROCEDURE — 3017F COLORECTAL CA SCREEN DOC REV: CPT | Performed by: STUDENT IN AN ORGANIZED HEALTH CARE EDUCATION/TRAINING PROGRAM

## 2023-03-14 PROCEDURE — 99214 OFFICE O/P EST MOD 30 MIN: CPT | Performed by: STUDENT IN AN ORGANIZED HEALTH CARE EDUCATION/TRAINING PROGRAM

## 2023-03-14 PROCEDURE — 3078F DIAST BP <80 MM HG: CPT | Performed by: STUDENT IN AN ORGANIZED HEALTH CARE EDUCATION/TRAINING PROGRAM

## 2023-03-14 PROCEDURE — G8427 DOCREV CUR MEDS BY ELIG CLIN: HCPCS | Performed by: STUDENT IN AN ORGANIZED HEALTH CARE EDUCATION/TRAINING PROGRAM

## 2023-03-14 PROCEDURE — 3074F SYST BP LT 130 MM HG: CPT | Performed by: STUDENT IN AN ORGANIZED HEALTH CARE EDUCATION/TRAINING PROGRAM

## 2023-03-14 PROCEDURE — G8417 CALC BMI ABV UP PARAM F/U: HCPCS | Performed by: STUDENT IN AN ORGANIZED HEALTH CARE EDUCATION/TRAINING PROGRAM

## 2023-03-14 PROCEDURE — 1036F TOBACCO NON-USER: CPT | Performed by: STUDENT IN AN ORGANIZED HEALTH CARE EDUCATION/TRAINING PROGRAM

## 2023-03-14 RX ORDER — OMEGA-3/DHA/EPA/FISH OIL 60 MG-90MG
CAPSULE ORAL 4 TIMES DAILY
COMMUNITY
Start: 2011-06-15

## 2023-03-14 RX ORDER — M-VIT,TX,IRON,MINS/CALC/FOLIC 27MG-0.4MG
1 TABLET ORAL DAILY
COMMUNITY

## 2023-03-14 SDOH — ECONOMIC STABILITY: HOUSING INSECURITY
IN THE LAST 12 MONTHS, WAS THERE A TIME WHEN YOU DID NOT HAVE A STEADY PLACE TO SLEEP OR SLEPT IN A SHELTER (INCLUDING NOW)?: NO

## 2023-03-14 SDOH — ECONOMIC STABILITY: FOOD INSECURITY: WITHIN THE PAST 12 MONTHS, THE FOOD YOU BOUGHT JUST DIDN'T LAST AND YOU DIDN'T HAVE MONEY TO GET MORE.: NEVER TRUE

## 2023-03-14 SDOH — ECONOMIC STABILITY: FOOD INSECURITY: WITHIN THE PAST 12 MONTHS, YOU WORRIED THAT YOUR FOOD WOULD RUN OUT BEFORE YOU GOT MONEY TO BUY MORE.: NEVER TRUE

## 2023-03-14 SDOH — ECONOMIC STABILITY: INCOME INSECURITY: HOW HARD IS IT FOR YOU TO PAY FOR THE VERY BASICS LIKE FOOD, HOUSING, MEDICAL CARE, AND HEATING?: NOT HARD AT ALL

## 2023-03-14 ASSESSMENT — ENCOUNTER SYMPTOMS
SHORTNESS OF BREATH: 0
COUGH: 0
CHEST TIGHTNESS: 0

## 2023-03-14 ASSESSMENT — PATIENT HEALTH QUESTIONNAIRE - PHQ9
SUM OF ALL RESPONSES TO PHQ QUESTIONS 1-9: 0
SUM OF ALL RESPONSES TO PHQ QUESTIONS 1-9: 0
1. LITTLE INTEREST OR PLEASURE IN DOING THINGS: 0
SUM OF ALL RESPONSES TO PHQ QUESTIONS 1-9: 0
SUM OF ALL RESPONSES TO PHQ9 QUESTIONS 1 & 2: 0
SUM OF ALL RESPONSES TO PHQ QUESTIONS 1-9: 0
2. FEELING DOWN, DEPRESSED OR HOPELESS: 0

## 2023-04-19 DIAGNOSIS — I10 ESSENTIAL HYPERTENSION: ICD-10-CM

## 2023-04-19 DIAGNOSIS — E78.00 PURE HYPERCHOLESTEROLEMIA: ICD-10-CM

## 2023-04-20 RX ORDER — AMLODIPINE BESYLATE 10 MG/1
TABLET ORAL
Qty: 90 TABLET | Refills: 1 | Status: SHIPPED | OUTPATIENT
Start: 2023-04-20

## 2023-04-20 RX ORDER — ROSUVASTATIN CALCIUM 10 MG/1
TABLET, COATED ORAL
Qty: 90 TABLET | Refills: 1 | Status: SHIPPED | OUTPATIENT
Start: 2023-04-20

## 2023-04-20 NOTE — TELEPHONE ENCOUNTER
Medication:   Requested Prescriptions     Pending Prescriptions Disp Refills    rosuvastatin (CRESTOR) 10 MG tablet [Pharmacy Med Name: ROSUVASTATIN CALCIUM 10 MG Tablet] 90 tablet 1     Sig: TAKE 1 TABLET EVERY DAY    amLODIPine (NORVASC) 10 MG tablet [Pharmacy Med Name: AMLODIPINE BESYLATE 10 MG Tablet] 90 tablet 1     Sig: TAKE 1 TABLET EVERY DAY        Last Filled:  08/4/22    Patient Phone Number: [phone] (home)     Last appt: 3/14/2023   Next appt: 6/2/2023    Last OARRS: No flowsheet data found.

## 2023-05-22 RX ORDER — MOMETASONE FUROATE 50 UG/1
2 SPRAY, METERED NASAL DAILY
Qty: 17 G | Refills: 2 | Status: SHIPPED | OUTPATIENT
Start: 2023-05-22

## 2023-06-02 ENCOUNTER — OFFICE VISIT (OUTPATIENT)
Dept: PRIMARY CARE CLINIC | Age: 63
End: 2023-06-02

## 2023-06-02 VITALS
WEIGHT: 240 LBS | SYSTOLIC BLOOD PRESSURE: 122 MMHG | DIASTOLIC BLOOD PRESSURE: 68 MMHG | BODY MASS INDEX: 31.81 KG/M2 | OXYGEN SATURATION: 97 % | HEIGHT: 73 IN | TEMPERATURE: 97.2 F | HEART RATE: 85 BPM

## 2023-06-02 DIAGNOSIS — E66.09 CLASS 1 OBESITY DUE TO EXCESS CALORIES WITH SERIOUS COMORBIDITY AND BODY MASS INDEX (BMI) OF 32.0 TO 32.9 IN ADULT: ICD-10-CM

## 2023-06-02 DIAGNOSIS — Z13.1 SCREENING FOR DIABETES MELLITUS: ICD-10-CM

## 2023-06-02 DIAGNOSIS — Z71.89 ACP (ADVANCE CARE PLANNING): ICD-10-CM

## 2023-06-02 DIAGNOSIS — E78.00 PURE HYPERCHOLESTEROLEMIA: ICD-10-CM

## 2023-06-02 DIAGNOSIS — I10 ESSENTIAL HYPERTENSION: ICD-10-CM

## 2023-06-02 DIAGNOSIS — Z00.00 MEDICARE ANNUAL WELLNESS VISIT, SUBSEQUENT: Primary | ICD-10-CM

## 2023-06-02 PROBLEM — D12.6 TUBULAR ADENOMA OF COLON: Status: RESOLVED | Noted: 2017-05-23 | Resolved: 2023-06-02

## 2023-06-02 PROBLEM — R79.89 ABNORMAL TSH: Status: RESOLVED | Noted: 2017-01-22 | Resolved: 2023-06-02

## 2023-06-02 LAB — HBA1C MFR BLD: 5.8 %

## 2023-06-02 ASSESSMENT — ENCOUNTER SYMPTOMS
SHORTNESS OF BREATH: 0
COUGH: 0
CHEST TIGHTNESS: 0

## 2023-06-02 ASSESSMENT — PATIENT HEALTH QUESTIONNAIRE - PHQ9
SUM OF ALL RESPONSES TO PHQ QUESTIONS 1-9: 0
1. LITTLE INTEREST OR PLEASURE IN DOING THINGS: 0
2. FEELING DOWN, DEPRESSED OR HOPELESS: 0
SUM OF ALL RESPONSES TO PHQ9 QUESTIONS 1 & 2: 0
SUM OF ALL RESPONSES TO PHQ QUESTIONS 1-9: 0

## 2023-06-02 ASSESSMENT — LIFESTYLE VARIABLES
HOW OFTEN DO YOU HAVE A DRINK CONTAINING ALCOHOL: NEVER
HOW MANY STANDARD DRINKS CONTAINING ALCOHOL DO YOU HAVE ON A TYPICAL DAY: PATIENT DOES NOT DRINK

## 2023-06-02 NOTE — PROGRESS NOTES
Medicare Annual Wellness Visit    Sidra Garvin is here for [facility]    Assessment & Plan   Essential hypertension  -     Comprehensive Metabolic Panel; Future  Pure hypercholesterolemia  -     Lipid, Fasting; Future  Class 1 obesity due to excess calories with serious comorbidity and body mass index (BMI) of 32.0 to 32.9 in adult  Screening for diabetes mellitus  -     POCT glycosylated hemoglobin (Hb A1C)  Medicare annual wellness visit, subsequent    Recommendations for Preventive Services Due: see orders and patient instructions/AVS.  Recommended screening schedule for the next 5-10 years is provided to the patient in written form: see Patient Instructions/AVS.     Return in about 1 year (around 6/2/2024) for Blood Pressure, High Cholesterol, Annual Wellness. Subjective       Patient's complete Health Risk Assessment and screening values have been reviewed and are found in Flowsheets. The following problems were reviewed today and where indicated follow up appointments were made and/or referrals ordered. Positive Risk Factor Screenings with Interventions:                 Weight and Activity:  Physical Activity: Sufficiently Active    Days of Exercise per Week: 3 days    Minutes of Exercise per Session: 80 min     On average, how many days per week do you engage in moderate to strenuous exercise (like a brisk walk)?: 3 days  Have you lost any weight without trying in the past 3 months?: No  Body mass index is 31.66 kg/m².  (!) Abnormal  Obesity Interventions:             Safety:  Do you have either shower bars, grab bars, non-slip mats or non-slip surfaces in your shower or bathtub?: (!) No  Interventions:  Patient declined any further interventions or treatment    ADL's:   Patient reports needing help with:  Select all that apply: (!) Transportation  Interventions:  Patient declined any further interventions or treatment    Advanced Directives:  Do you have a Living Will?: (!) No (acp
TABLET EVERY DAY Yes Emerita Wagoner DO   amLODIPine (NORVASC) 10 MG tablet TAKE 1 TABLET EVERY DAY Yes Emerita Wagoner DO   Omega-3 Fatty Acids (FISH OIL) 500 MG CAPS Take by mouth 4 times daily Yes Historical Provider, MD   Multiple Vitamins-Minerals (THERAPEUTIC MULTIVITAMIN-MINERALS) tablet Take 1 tablet by mouth daily Yes Historical Provider, MD   BLACK CURRANT SEED OIL PO Take by mouth Yes Historical Provider, MD   tiZANidine (ZANAFLEX) 4 MG tablet TAKE 1 TABLET BY MOUTH EVERY 8 HOURS AS NEEDED (BACK PAIN) Yes Emerita Wagoner DO   montelukast (SINGULAIR) 10 MG tablet TAKE 1 TABLET EVERY NIGHT Yes Emerita Wagoner DO   brimonidine (ALPHAGAN) 0.2 % ophthalmic solution 1 drop in the morning and at bedtime Yes Historical Provider, MD   vitamin E 1000 units capsule Take 1 capsule by mouth daily Yes Historical Provider, MD   ketoconazole (NIZORAL) 2 % shampoo Apply topically Yes Historical Provider, MD   ascorbic acid (VITAMIN C) 100 MG tablet Take 1 tablet by mouth daily Yes Historical Provider, MD   tadalafil (CIALIS) 10 MG tablet Take 1 tablet by mouth as needed for Erectile Dysfunction Yes Emerita Wagoner DO   BIKTARVY -25 MG TABS per tablet  Yes Historical Provider, MD   Blood Pressure KIT 1 kit by Does not apply route daily Yes Emerita Wagoner DO        Social History     Tobacco Use    Smoking status: Never    Smokeless tobacco: Never   Substance Use Topics    Alcohol use: Not Currently        Vitals:    06/02/23 0926   BP: 122/68   Pulse: 85   Temp: 97.2 °F (36.2 °C)   SpO2: 97%   Weight: 240 lb (108.9 kg)   Height: 6' 1\" (1.854 m)     Estimated body mass index is 31.66 kg/m² as calculated from the following:    Height as of this encounter: 6' 1\" (1.854 m). Weight as of this encounter: 240 lb (108.9 kg). Physical Exam  Vitals reviewed. Constitutional:       Appearance: Normal appearance. He is obese. HENT:      Head: Normocephalic and atraumatic.       Nose: Nose normal.      Mouth/Throat:      Mouth: Mucous membranes

## 2023-06-02 NOTE — PATIENT INSTRUCTIONS
Personalized Preventive Plan for Sima Maki - 6/2/2023  Medicare offers a range of preventive health benefits. Some of the tests and screenings are paid in full while other may be subject to a deductible, co-insurance, and/or copay. Some of these benefits include a comprehensive review of your medical history including lifestyle, illnesses that may run in your family, and various assessments and screenings as appropriate. After reviewing your medical record and screening and assessments performed today your provider may have ordered immunizations, labs, imaging, and/or referrals for you. A list of these orders (if applicable) as well as your Preventive Care list are included within your After Visit Summary for your review. Other Preventive Recommendations:    A preventive eye exam performed by an eye specialist is recommended every 1-2 years to screen for glaucoma; cataracts, macular degeneration, and other eye disorders. A preventive dental visit is recommended every 6 months. Try to get at least 150 minutes of exercise per week or 10,000 steps per day on a pedometer . Order or download the FREE \"Exercise & Physical Activity: Your Everyday Guide\" from [employer]. Call 4-496.569.3698 or search [employer] online. You need 5333-0448 mg of calcium and 4183-6631 IU of vitamin D per day. It is possible to meet your calcium requirement with diet alone, but a vitamin D supplement is usually necessary to meet this goal.  When exposed to the sun, use a sunscreen that protects against both UVA and UVB radiation with an SPF of 30 or greater. Reapply every 2 to 3 hours or after sweating, drying off with a towel, or swimming. Always wear a seat belt when traveling in a car. Always wear a helmet when riding a bicycle or motorcycle.

## 2023-06-05 ENCOUNTER — CLINICAL DOCUMENTATION (OUTPATIENT)
Dept: SPIRITUAL SERVICES | Age: 63
End: 2023-06-05

## 2023-06-05 NOTE — ACP (ADVANCE CARE PLANNING)
Advance Care Planning   Ambulatory ACP Specialist Patient Outreach    Date:  6/5/2023    ACP Specialist:  Kiya Hess    Outreach call to patient in follow-up to ACP Specialist referral from:Francisco Leonardo DO    [x] PCP  [] Provider   [] Ambulatory Care Management [] Other     For:                  [x] Advance Directive Assistance              [] Complete Portable DNR order              [] Complete POST/POLST/MOST              [] Code Status Discussion             [] Discuss Goals of Care             [] Early ACP Decision-Making              [] Other (Specify)    Date Referral Received:6/2/23    Next Step:   [x] ACP scheduled conversation  [] Outreach again in one week               [] Email / Mail [address]  [] Email / Mail Advance Directive   [] Closing referral.  Routing closure to referring provider/staff and to ACP Specialist . [] Closure letter mailed to patient with invitation to contact ACP Specialist if / when ready. [] Other (Specify here): Outreaches:       [x] 1st -  Date:  6/5/23               Intervention:  [x] Spoke with Patient   [] Left Voice mail [] Email / Mail    [] [employer]  [] Other [id number]) : Outcomes: Scheduled ACP Conversation Call for Monday June 12th at 3pm.           [] 2nd -  Date:                 Intervention:  [] Spoke with Patient  [] Left Voice mail [] Email / Mail    [] [employer]  [] Other [id number] : Outcomes:                [] 3rd -  Date:                Intervention:  [] Spoke with Patient   [] Left Voice mail [] Email / Mail    [] [employer]  [] Other [id number]) : Outcomes:           []  Additional Outreach -  Date:     (Specify Dates & special circumstances): Outcomes:          Thank you for this referral.

## 2023-06-20 ENCOUNTER — CLINICAL DOCUMENTATION (OUTPATIENT)
Dept: SPIRITUAL SERVICES | Age: 63
End: 2023-06-20

## 2023-06-20 NOTE — ACP (ADVANCE CARE PLANNING)
Advance Care Planning     Advance Care Planning Clinical Specialist  Conversation Note      Date of ACP Conversation: 6/20/2023    Conversation Conducted with: Patient with Decision Making Capacity    ACP Clinical Specialist: TANVI Quiroz  Healthcare Decision Maker:     Current Designated Healthcare Decision Maker:     Primary Decision Maker: Sujey Hall - Brother/Sister - [phone]    Secondary Decision Maker: Eduarda Kim - Other - [phone]    Supplemental (Other) Decision Maker: Sandra Elizondo - Other - [phone]  Click here to complete Healthcare Decision Makers including section of the Healthcare Decision Maker Relationship (ie \"Primary\")  Today we completed the ACP conversation, nomination of HCDM',s and completion of AD docs via DocuSign. Care Preferences    Hospitalization: \"If your health worsens and it becomes clear that your chance of recovery is unlikely, what would your preference be regarding hospitalization? \"    Choice:  [] The patient wants hospitalization. [x] The patient prefers comfort-focused treatment without hospitalization. Ventilation: \"If you were in your present state of health and suddenly became very ill and were unable to breathe on your own, what would your preference be about the use of a ventilator (breathing machine) if it were available to you? \"      If the patient would desire the use of ventilator (breathing machine), answer \"yes\". If not, \"no\": yes    \"If your health worsens and it becomes clear that your chance of recovery is unlikely, what would your preference be about the use of a ventilator (breathing machine) if it were available to you? \"     Would the patient desire the use of ventilator (breathing machine)?: No      Resuscitation  \"CPR works best to restart the heart when there is a sudden event, like a heart attack, in someone who is otherwise healthy.  Unfortunately, CPR does not typically restart the heart for people who have serious

## 2023-06-30 DIAGNOSIS — I10 ESSENTIAL HYPERTENSION: ICD-10-CM

## 2023-06-30 DIAGNOSIS — E78.00 PURE HYPERCHOLESTEROLEMIA: ICD-10-CM

## 2023-06-30 LAB
ALBUMIN SERPL-MCNC: 4.5 G/DL (ref 3.4–5)
ALBUMIN/GLOB SERPL: 1.6 {RATIO} (ref 1.1–2.2)
ALP SERPL-CCNC: 102 U/L (ref 40–129)
ALT SERPL-CCNC: 25 U/L (ref 10–40)
ANION GAP SERPL CALCULATED.3IONS-SCNC: 7 MMOL/L (ref 3–16)
AST SERPL-CCNC: 23 U/L (ref 15–37)
BILIRUB SERPL-MCNC: 0.3 MG/DL (ref 0–1)
BUN SERPL-MCNC: 10 MG/DL (ref 7–20)
CALCIUM SERPL-MCNC: 9.3 MG/DL (ref 8.3–10.6)
CHLORIDE SERPL-SCNC: 103 MMOL/L (ref 99–110)
CHOLEST SERPL-MCNC: 127 MG/DL (ref 0–199)
CO2 SERPL-SCNC: 27 MMOL/L (ref 21–32)
CREAT SERPL-MCNC: 1.2 MG/DL (ref 0.8–1.3)
GFR SERPLBLD CREATININE-BSD FMLA CKD-EPI: >60 ML/MIN/{1.73_M2}
GLUCOSE SERPL-MCNC: 109 MG/DL (ref 70–99)
HDLC SERPL-MCNC: 47 MG/DL (ref 40–60)
LDL CHOLESTEROL CALCULATED: 57 MG/DL
POTASSIUM SERPL-SCNC: 3.7 MMOL/L (ref 3.5–5.1)
PROT SERPL-MCNC: 7.3 G/DL (ref 6.4–8.2)
SODIUM SERPL-SCNC: 137 MMOL/L (ref 136–145)
TRIGL SERPL-MCNC: 116 MG/DL (ref 0–150)
VLDLC SERPL CALC-MCNC: 23 MG/DL

## 2023-09-05 RX ORDER — MOMETASONE FUROATE 50 UG/1
SPRAY, METERED NASAL
Qty: 51 G | OUTPATIENT
Start: 2023-09-05

## 2023-09-05 NOTE — TELEPHONE ENCOUNTER
Medication:   Requested Prescriptions     Pending Prescriptions Disp Refills    mometasone (NASONEX) 50 MCG/ACT nasal spray [Pharmacy Med Name: MOMETASONE FUROATE 50 MCG/ACT Suspension] 51 g      Sig: USE 2 SPRAYS NASALLY EVERY DAY        Last Filled:  05/22/23    Patient Phone Number: [phone] (home)     Last appt: 6/2/2023   Next appt: Visit date not found    Last OARRS: No flowsheet data found.

## 2023-09-06 DIAGNOSIS — I10 ESSENTIAL HYPERTENSION: ICD-10-CM

## 2023-09-06 DIAGNOSIS — L20.82 FLEXURAL ECZEMA: ICD-10-CM

## 2023-09-06 DIAGNOSIS — E78.00 PURE HYPERCHOLESTEROLEMIA: ICD-10-CM

## 2023-09-06 RX ORDER — TIZANIDINE 4 MG/1
4 TABLET ORAL EVERY 8 HOURS PRN
Qty: 90 TABLET | Refills: 2 | Status: SHIPPED | OUTPATIENT
Start: 2023-09-06

## 2023-09-06 RX ORDER — MOMETASONE FUROATE 50 UG/1
2 SPRAY, METERED NASAL DAILY
Qty: 17 G | Refills: 2 | Status: SHIPPED | OUTPATIENT
Start: 2023-09-06

## 2023-09-06 RX ORDER — AMLODIPINE BESYLATE 10 MG/1
10 TABLET ORAL DAILY
Qty: 90 TABLET | Refills: 1 | Status: SHIPPED | OUTPATIENT
Start: 2023-09-06

## 2023-09-06 RX ORDER — ROSUVASTATIN CALCIUM 10 MG/1
10 TABLET, COATED ORAL DAILY
Qty: 90 TABLET | Refills: 1 | Status: SHIPPED | OUTPATIENT
Start: 2023-09-06

## 2023-09-06 RX ORDER — TRIAMCINOLONE ACETONIDE OINTMENT USP, 0.05% 0.5 MG/G
OINTMENT TOPICAL 2 TIMES DAILY
Qty: 430 G | Refills: 0 | Status: SHIPPED | OUTPATIENT
Start: 2023-09-06

## 2023-09-06 NOTE — TELEPHONE ENCOUNTER
triamcinolone (KENALOG) 0.1 % ointment     mometasone (NASONEX) 50 MCG/ACT nasal spray     rosuvastatin (CRESTOR) 10 MG tablet     amLODIPine (NORVASC) 10 MG tablet     tiZANidine (ZANAFLEX) 4 MG tablet     [facility] - [facility] [phone] - F [phone]   [address] [place] [address]   Phone:  [phone]  Fax:  [phone]    Pt for a refill.

## 2023-09-06 NOTE — TELEPHONE ENCOUNTER
Medication:   Requested Prescriptions     Pending Prescriptions Disp Refills    amLODIPine (NORVASC) 10 MG tablet 90 tablet 1     Sig: Take 1 tablet by mouth daily    rosuvastatin (CRESTOR) 10 MG tablet 90 tablet 1     Sig: Take 1 tablet by mouth daily    mometasone (NASONEX) 50 MCG/ACT nasal spray 17 g 2     Si sprays by Nasal route daily    tiZANidine (ZANAFLEX) 4 MG tablet 90 tablet 2     Sig: Take 1 tablet by mouth every 8 hours as needed (back pain)    triamcinolone (KENALOG) 0.05 % OINT ointment 430 g 0     Sig: Apply topically 2 times daily        Last Filled:  23    Patient Phone Number: [phone] (home)     Last appt: 2023 Return in about 1 year (around 2024) for Blood Pressure, High Cholesterol, Annual Wellness. Next appt: Visit date not found    Last OARRS: No flowsheet data found.

## 2023-09-15 ENCOUNTER — TELEPHONE (OUTPATIENT)
Dept: PRIMARY CARE CLINIC | Age: 63
End: 2023-09-15

## 2023-09-15 NOTE — TELEPHONE ENCOUNTER
mometasone (NASONEX) 50 MCG/ACT nasal spray [[id number]]     Order Details  Dose: 2 spray Route: Nasal Frequency: DAILY   Dispense Quantity: 17 g Refills: 2          Si sprays by Nasal route daily         Start Date: 23 End Date: --   Written Date: 23 Expiration Date: 24   Original Order:  mometasone (NASONEX) 50 MCG/ACT nasal spray [[id number]]   Providers    Authorizing Provider: Ave Espino DO NPI: [id number]   Ordering User:  Ave Espino, [address]    [employer] Mail Delivery [facility] [phone] - F [phone]   [facility] [address]   Phone:  [phone]  Fax:  [phone]

## 2023-09-19 NOTE — TELEPHONE ENCOUNTER
Submitted PA for Mometasone Furoate 50MCG/ACT suspension  Via CMM Key: [id number]  STATUS: APPROVED  Coverage Starts on: 1/1/2023 12:00:00 AM  Coverage Ends on: 12/31/2023 12:00:00 AM    If this requires a response please respond to the pool ( [phone] [id number] [phone]). Thank you please advise patient.

## 2023-09-27 RX ORDER — TIZANIDINE 4 MG/1
TABLET ORAL
Qty: 90 TABLET | Refills: 2 | OUTPATIENT
Start: 2023-09-27

## 2023-09-27 NOTE — TELEPHONE ENCOUNTER
Medication:   Requested Prescriptions     Pending Prescriptions Disp Refills    tiZANidine (ZANAFLEX) 4 MG tablet [Pharmacy Med Name: TIZANIDINE HYDROCHLORIDE 4 MG Tablet] 90 tablet 2     Sig: TAKE 1 TABLET EVERY 8 HOURS AS NEEDED (BACK PAIN)        Last Filled:  09/06/23    Patient Phone Number: [phone] (home)     Last appt: 6/2/2023   Next appt: Visit date not found    Last OARRS:        No data to display

## 2023-11-16 RX ORDER — MOMETASONE FUROATE 50 UG/1
SPRAY, METERED NASAL
Qty: 17 G | Refills: 10 | Status: SHIPPED | OUTPATIENT
Start: 2023-11-16

## 2023-11-16 NOTE — TELEPHONE ENCOUNTER
Medication:   Requested Prescriptions     Pending Prescriptions Disp Refills    mometasone (NASONEX) 50 MCG/ACT nasal spray [Pharmacy Med Name: MOMETASONE FUROATE 50 MCG/ACT Suspension] 17 g 10     Sig: USE 2 SPRAYS NASALLY EVERY DAY        Last Filled:  09/06/23    Patient Phone Number: [phone] (home)     Last appt: 6/2/2023 Return in about 1 year (around 6/2/2024) for Blood Pressure, High Cholesterol, Annual Wellness.      Next appt: Visit date not found    Last OARRS:        No data to display

## 2023-12-05 RX ORDER — TIZANIDINE 4 MG/1
4 TABLET ORAL EVERY 8 HOURS PRN
Qty: 90 TABLET | Refills: 2 | Status: SHIPPED | OUTPATIENT
Start: 2023-12-05

## 2023-12-05 NOTE — TELEPHONE ENCOUNTER
Medication:   Requested Prescriptions     Pending Prescriptions Disp Refills    tiZANidine (ZANAFLEX) 4 MG tablet 90 tablet 2     Sig: Take 1 tablet by mouth every 8 hours as needed (back pain)        Last Filled:  09/6/23    Patient Phone Number: [phone] (home)     Last appt: 6/2/2023   Next appt: Visit date not found    Last OARRS:        No data to display

## 2024-02-12 RX ORDER — TIZANIDINE 4 MG/1
TABLET ORAL
Qty: 90 TABLET | Refills: 3 | Status: SHIPPED | OUTPATIENT
Start: 2024-02-12

## 2024-02-12 NOTE — TELEPHONE ENCOUNTER
Medication:   Requested Prescriptions     Pending Prescriptions Disp Refills    tiZANidine (ZANAFLEX) 4 MG tablet [Pharmacy Med Name: TIZANIDINE HYDROCHLORIDE 4 MG Tablet] 90 tablet 3     Sig: TAKE 1 TABLET EVERY 8 HOURS AS NEEDED (BACK PAIN)        Last Filled:    12/5/23  Patient Phone Number: [phone] (home)     Last appt: 6/2/2023   Next appt: 6/3/2024    Last OARRS:        No data to display

## 2024-03-07 ENCOUNTER — TELEMEDICINE (OUTPATIENT)
Dept: PRIMARY CARE CLINIC | Age: 64
End: 2024-03-07

## 2024-03-07 DIAGNOSIS — Z71.89 COUNSELING FOR LIVING WILL: Primary | ICD-10-CM

## 2024-03-07 DIAGNOSIS — Z00.00 MEDICARE ANNUAL WELLNESS VISIT, SUBSEQUENT: ICD-10-CM

## 2024-03-07 ASSESSMENT — PATIENT HEALTH QUESTIONNAIRE - PHQ9
SUM OF ALL RESPONSES TO PHQ QUESTIONS 1-9: 0
2. FEELING DOWN, DEPRESSED OR HOPELESS: 0
1. LITTLE INTEREST OR PLEASURE IN DOING THINGS: 0
SUM OF ALL RESPONSES TO PHQ9 QUESTIONS 1 & 2: 0

## 2024-03-07 ASSESSMENT — LIFESTYLE VARIABLES
HOW MANY STANDARD DRINKS CONTAINING ALCOHOL DO YOU HAVE ON A TYPICAL DAY: PATIENT DOES NOT DRINK
HOW OFTEN DO YOU HAVE A DRINK CONTAINING ALCOHOL: NEVER

## 2024-03-07 NOTE — PROGRESS NOTES
Patient identification was verified, and a caregiver was present when appropriate.   The patient was located at Home:  [id number]  [facility] [address]  Provider was located at Facility (Appt Dept): [address] [place] [id number]

## 2024-03-07 NOTE — PATIENT INSTRUCTIONS
feeling in the chest.     Sweating.     Shortness of breath.     Pain, pressure, or a strange feeling in the back, neck, jaw, or upper belly or in one or both shoulders or arms.     Lightheadedness or sudden weakness.     A fast or irregular heartbeat.   After you call 911, the  may tell you to chew 1 adult-strength or 2 to 4 low-dose aspirin. Wait for an ambulance. Do not try to drive yourself.  Watch closely for changes in your health, and be sure to contact your doctor if you have any problems.  Where can you learn more?  Go to https://www.[employer].net/patientEd and enter F075 to learn more about \"A Healthy Heart: Care Instructions.\"  Current as of: June 24, 2023               Content Version: 14.0  © 2662-3188 [employer].   Care instructions adapted under license by [employer]. If you have questions about a medical condition or this instruction, always ask your healthcare professional. [employer] disclaims any warranty or liability for your use of this information.      Personalized Preventive Plan for Harinder Bush - 3/7/2024  Medicare offers a range of preventive health benefits. Some of the tests and screenings are paid in full while other may be subject to a deductible, co-insurance, and/or copay.    Some of these benefits include a comprehensive review of your medical history including lifestyle, illnesses that may run in your family, and various assessments and screenings as appropriate.    After reviewing your medical record and screening and assessments performed today your provider may have ordered immunizations, labs, imaging, and/or referrals for you.  A list of these orders (if applicable) as well as your Preventive Care list are included within your After Visit Summary for your review.    Other Preventive Recommendations:    A preventive eye exam performed by an eye specialist is recommended every 1-2 years to screen for glaucoma; cataracts, macular

## 2024-03-08 ENCOUNTER — CLINICAL DOCUMENTATION (OUTPATIENT)
Dept: SPIRITUAL SERVICES | Age: 64
End: 2024-03-08

## 2024-03-08 NOTE — ACP (ADVANCE CARE PLANNING)
Intervention:  [] Spoke with Patient  [] Left Voice mail [] Email / Mail    [] MyChart  [] Other (Specify) :              Outcomes:                [] 3rd -  Date:                Intervention:  [] Spoke with Patient   [] Left Voice mail [] Email / Mail    [] MyChart  [] Other (Specify) :       Outcomes:           []  Additional Outreach -  Date:     (Specify Dates & special circumstances):    Outcomes:         Thank you for this referral.

## 2024-04-07 DIAGNOSIS — I10 ESSENTIAL HYPERTENSION: ICD-10-CM

## 2024-04-08 RX ORDER — AMLODIPINE BESYLATE 10 MG/1
10 TABLET ORAL DAILY
Qty: 90 TABLET | Refills: 3 | Status: SHIPPED | OUTPATIENT
Start: 2024-04-08

## 2024-04-08 NOTE — TELEPHONE ENCOUNTER
Medication:   Requested Prescriptions     Pending Prescriptions Disp Refills    amLODIPine (NORVASC) 10 MG tablet [Pharmacy Med Name: AMLODIPINE BESYLATE 10 MG Tablet] 90 tablet 3     Sig: TAKE 1 TABLET EVERY DAY        Last Filled:  09/6/23    Patient Phone Number: [phone] (home)     Last appt: 3/7/2024   Next appt: 6/3/2024    Last OARRS:        No data to display

## 2024-04-08 NOTE — TELEPHONE ENCOUNTER
Medication:   Requested Prescriptions     Pending Prescriptions Disp Refills    amLODIPine (NORVASC) 10 MG tablet [Pharmacy Med Name: AMLODIPINE BESYLATE 10 MG Tablet] 90 tablet 3     Sig: TAKE 1 TABLET EVERY DAY        Last Filled:  09/06/23    Patient Phone Number: [phone] (home)     Last appt: 3/7/2024   Next appt: 6/3/2024    Last OARRS:        No data to display

## 2024-05-14 RX ORDER — TIZANIDINE 4 MG/1
TABLET ORAL
Qty: 90 TABLET | Refills: 11 | Status: SHIPPED | OUTPATIENT
Start: 2024-05-14

## 2024-05-14 NOTE — TELEPHONE ENCOUNTER
Medication:   Requested Prescriptions     Pending Prescriptions Disp Refills    tiZANidine (ZANAFLEX) 4 MG tablet [Pharmacy Med Name: TIZANIDINE HYDROCHLORIDE 4 MG Tablet] 90 tablet 11     Sig: TAKE 1 TABLET EVERY 8 HOURS AS NEEDED (BACK PAIN)        Last Filled:  02/12/24    Patient Phone Number: [phone] (home)     Last appt: 3/7/2024 Return in about 1 year (around 6/2/2024) for Blood Pressure, High Cholesterol, Annual Wellness.   Next appt: 6/3/2024    Last OARRS:        No data to display

## 2024-05-15 ENCOUNTER — TELEPHONE (OUTPATIENT)
Dept: PRIMARY CARE CLINIC | Age: 64
End: 2024-05-15

## 2024-05-15 DIAGNOSIS — E78.00 PURE HYPERCHOLESTEROLEMIA: ICD-10-CM

## 2024-05-15 RX ORDER — ROSUVASTATIN CALCIUM 10 MG/1
10 TABLET, COATED ORAL DAILY
Qty: 90 TABLET | Refills: 3 | Status: SHIPPED | OUTPATIENT
Start: 2024-05-15

## 2024-05-15 NOTE — TELEPHONE ENCOUNTER
Medication:   Requested Prescriptions     Pending Prescriptions Disp Refills    rosuvastatin (CRESTOR) 10 MG tablet [Pharmacy Med Name: ROSUVASTATIN CALCIUM 10 MG Tablet] 90 tablet 3     Sig: TAKE 1 TABLET EVERY DAY        Last Filled:  09/06/23    Patient Phone Number: [phone] (home)     Last appt: 3/7/2024 eturn in about 1 year (around 6/2/2024) for Blood Pressure, High Cholesterol, Annual Wellness.   Next appt: 6/3/2024    Last OARRS:        No data to display

## 2024-05-15 NOTE — TELEPHONE ENCOUNTER
[facility] - [address] - P [phone] - F [phone] [[id number]]     Pt called in stating that the pharmacy would like to know if we can bridge 15 pills until his mail order come thru.     rosuvastatin (CRESTOR) 10 MG tablet     Pharmacy states it will take almost 12- 15 days reach Pt.

## 2024-06-03 ENCOUNTER — OFFICE VISIT (OUTPATIENT)
Dept: PRIMARY CARE CLINIC | Age: 64
End: 2024-06-03
Payer: MEDICARE

## 2024-06-03 VITALS
SYSTOLIC BLOOD PRESSURE: 136 MMHG | DIASTOLIC BLOOD PRESSURE: 80 MMHG | HEART RATE: 69 BPM | TEMPERATURE: 97.6 F | OXYGEN SATURATION: 98 % | BODY MASS INDEX: 33.16 KG/M2 | HEIGHT: 73 IN | WEIGHT: 250.2 LBS

## 2024-06-03 DIAGNOSIS — Z13.1 SCREENING FOR DIABETES MELLITUS: ICD-10-CM

## 2024-06-03 DIAGNOSIS — Z12.11 SCREEN FOR COLON CANCER: ICD-10-CM

## 2024-06-03 DIAGNOSIS — Z21 ASYMPTOMATIC HIV INFECTION, WITH NO HISTORY OF HIV-RELATED ILLNESS (HCC): ICD-10-CM

## 2024-06-03 DIAGNOSIS — I10 ESSENTIAL HYPERTENSION: Primary | ICD-10-CM

## 2024-06-03 DIAGNOSIS — E66.09 CLASS 1 OBESITY DUE TO EXCESS CALORIES WITH SERIOUS COMORBIDITY AND BODY MASS INDEX (BMI) OF 32.0 TO 32.9 IN ADULT: ICD-10-CM

## 2024-06-03 DIAGNOSIS — E78.00 PURE HYPERCHOLESTEROLEMIA: ICD-10-CM

## 2024-06-03 LAB — HBA1C MFR BLD: 6.3 %

## 2024-06-03 PROCEDURE — 3079F DIAST BP 80-89 MM HG: CPT | Performed by: STUDENT IN AN ORGANIZED HEALTH CARE EDUCATION/TRAINING PROGRAM

## 2024-06-03 PROCEDURE — 83036 HEMOGLOBIN GLYCOSYLATED A1C: CPT | Performed by: STUDENT IN AN ORGANIZED HEALTH CARE EDUCATION/TRAINING PROGRAM

## 2024-06-03 PROCEDURE — 3075F SYST BP GE 130 - 139MM HG: CPT | Performed by: STUDENT IN AN ORGANIZED HEALTH CARE EDUCATION/TRAINING PROGRAM

## 2024-06-03 PROCEDURE — G8427 DOCREV CUR MEDS BY ELIG CLIN: HCPCS | Performed by: STUDENT IN AN ORGANIZED HEALTH CARE EDUCATION/TRAINING PROGRAM

## 2024-06-03 PROCEDURE — 99214 OFFICE O/P EST MOD 30 MIN: CPT | Performed by: STUDENT IN AN ORGANIZED HEALTH CARE EDUCATION/TRAINING PROGRAM

## 2024-06-03 PROCEDURE — 1036F TOBACCO NON-USER: CPT | Performed by: STUDENT IN AN ORGANIZED HEALTH CARE EDUCATION/TRAINING PROGRAM

## 2024-06-03 PROCEDURE — 3017F COLORECTAL CA SCREEN DOC REV: CPT | Performed by: STUDENT IN AN ORGANIZED HEALTH CARE EDUCATION/TRAINING PROGRAM

## 2024-06-03 PROCEDURE — G8417 CALC BMI ABV UP PARAM F/U: HCPCS | Performed by: STUDENT IN AN ORGANIZED HEALTH CARE EDUCATION/TRAINING PROGRAM

## 2024-06-03 PROCEDURE — 1123F ACP DISCUSS/DSCN MKR DOCD: CPT | Performed by: STUDENT IN AN ORGANIZED HEALTH CARE EDUCATION/TRAINING PROGRAM

## 2024-06-03 RX ORDER — AMLODIPINE BESYLATE 10 MG/1
10 TABLET ORAL DAILY
Qty: 90 TABLET | Refills: 3 | Status: SHIPPED | OUTPATIENT
Start: 2024-06-03

## 2024-06-03 RX ORDER — MONTELUKAST SODIUM 10 MG/1
10 TABLET ORAL NIGHTLY
Qty: 90 TABLET | Refills: 1 | Status: SHIPPED | OUTPATIENT
Start: 2024-06-03 | End: 2024-11-30

## 2024-06-03 RX ORDER — ROSUVASTATIN CALCIUM 10 MG/1
10 TABLET, COATED ORAL DAILY
Qty: 90 TABLET | Refills: 3 | Status: SHIPPED | OUTPATIENT
Start: 2024-06-03

## 2024-06-03 RX ORDER — KETOCONAZOLE 20 MG/ML
SHAMPOO TOPICAL DAILY PRN
Qty: 1 EACH | Refills: 3 | Status: SHIPPED | OUTPATIENT
Start: 2024-06-03 | End: 2024-07-03

## 2024-06-03 ASSESSMENT — ENCOUNTER SYMPTOMS
CHEST TIGHTNESS: 0
SHORTNESS OF BREATH: 0
COUGH: 0

## 2024-06-03 NOTE — PROGRESS NOTES
6/3/2024     Harinder Bush (:  1960) is a 63 y.o. male, here for evaluation of the following medical concerns:    HPI    Hypertension:  Home blood pressure monitoring: No. Patient denies chest pain, shortness of breath, headache, lightheadedness, blurred vision, peripheral edema, palpitations, dry cough, and fatigue.  Antihypertensive medication side effects: no medication side effects noted.  Use of agents associated with hypertension: none.                                        Sodium (mmol/L)   Date Value   2023 137    BUN (mg/dL)   Date Value   2023 10    Glucose (mg/dL)   Date Value   2023 109 (H)      Potassium (mmol/L)   Date Value   2023 3.7    Creatinine (mg/dL)   Date Value   2023 1.2         Hyperlipidemia:  No new myalgias or GI upset on rosuvastatin (Crestor).  Does not follow a low-fat diet.  Walks 2-3 times per week.    Lab Results   Component Value Date    CHOL 152 2019    TRIG 57 2019    HDL 47 2023     Lab Results   Component Value Date    ALT 25 2023    AST 23 2023          Review of Systems   Constitutional:  Negative for fatigue.   Eyes:  Negative for visual disturbance.   Respiratory:  Negative for cough, chest tightness and shortness of breath.    Cardiovascular:  Negative for chest pain, palpitations and leg swelling.   Endocrine: Negative for polydipsia, polyphagia and polyuria.   Genitourinary:  Negative for frequency.   Skin:  Negative for rash.   Neurological:  Negative for dizziness, syncope, weakness and light-headedness.       Prior to Visit Medications    Medication Sig Taking? Authorizing Provider   montelukast (SINGULAIR) 10 MG tablet Take 1 tablet by mouth nightly Yes Francisco Queen DO   ketoconazole (NIZORAL) 2 % shampoo Apply topically daily as needed for Itching Yes Francisco Queen DO   amLODIPine (NORVASC) 10 MG tablet Take 1 tablet by mouth daily Yes Francisco Queen DO   rosuvastatin (CRESTOR) 10 MG tablet

## 2024-12-09 ENCOUNTER — OFFICE VISIT (OUTPATIENT)
Dept: PRIMARY CARE CLINIC | Age: 64
End: 2024-12-09
Payer: MEDICARE

## 2024-12-09 VITALS
SYSTOLIC BLOOD PRESSURE: 128 MMHG | BODY MASS INDEX: 33.13 KG/M2 | HEART RATE: 83 BPM | WEIGHT: 250 LBS | DIASTOLIC BLOOD PRESSURE: 70 MMHG | OXYGEN SATURATION: 97 % | TEMPERATURE: 97.9 F | HEIGHT: 73 IN

## 2024-12-09 DIAGNOSIS — I10 ESSENTIAL HYPERTENSION: ICD-10-CM

## 2024-12-09 DIAGNOSIS — I10 ESSENTIAL HYPERTENSION: Primary | ICD-10-CM

## 2024-12-09 DIAGNOSIS — Z71.89 ADVANCE CARE PLANNING: ICD-10-CM

## 2024-12-09 DIAGNOSIS — E66.811 CLASS 1 OBESITY DUE TO EXCESS CALORIES WITH SERIOUS COMORBIDITY AND BODY MASS INDEX (BMI) OF 32.0 TO 32.9 IN ADULT: ICD-10-CM

## 2024-12-09 DIAGNOSIS — E78.00 PURE HYPERCHOLESTEROLEMIA: ICD-10-CM

## 2024-12-09 DIAGNOSIS — E66.09 CLASS 1 OBESITY DUE TO EXCESS CALORIES WITH SERIOUS COMORBIDITY AND BODY MASS INDEX (BMI) OF 32.0 TO 32.9 IN ADULT: ICD-10-CM

## 2024-12-09 LAB
ALBUMIN SERPL-MCNC: 4.4 G/DL (ref 3.4–5)
ALBUMIN/GLOB SERPL: 1.7 {RATIO} (ref 1.1–2.2)
ALP SERPL-CCNC: 81 U/L (ref 40–129)
ALT SERPL-CCNC: 23 U/L (ref 10–40)
ANION GAP SERPL CALCULATED.3IONS-SCNC: 12 MMOL/L (ref 3–16)
AST SERPL-CCNC: 19 U/L (ref 15–37)
BILIRUB SERPL-MCNC: 0.5 MG/DL (ref 0–1)
BUN SERPL-MCNC: 12 MG/DL (ref 7–20)
CALCIUM SERPL-MCNC: 9.4 MG/DL (ref 8.3–10.6)
CHLORIDE SERPL-SCNC: 106 MMOL/L (ref 99–110)
CHOLEST SERPL-MCNC: 152 MG/DL (ref 0–199)
CO2 SERPL-SCNC: 26 MMOL/L (ref 21–32)
CREAT SERPL-MCNC: 1.1 MG/DL (ref 0.8–1.3)
GFR SERPLBLD CREATININE-BSD FMLA CKD-EPI: 74 ML/MIN/{1.73_M2}
GLUCOSE SERPL-MCNC: 105 MG/DL (ref 70–99)
HDLC SERPL-MCNC: 51 MG/DL (ref 40–60)
LDL CHOLESTEROL: 86 MG/DL
POTASSIUM SERPL-SCNC: 4.1 MMOL/L (ref 3.5–5.1)
PROT SERPL-MCNC: 7 G/DL (ref 6.4–8.2)
SODIUM SERPL-SCNC: 144 MMOL/L (ref 136–145)
TRIGL SERPL-MCNC: 77 MG/DL (ref 0–150)
VLDLC SERPL CALC-MCNC: 15 MG/DL

## 2024-12-09 PROCEDURE — 3017F COLORECTAL CA SCREEN DOC REV: CPT | Performed by: STUDENT IN AN ORGANIZED HEALTH CARE EDUCATION/TRAINING PROGRAM

## 2024-12-09 PROCEDURE — 1160F RVW MEDS BY RX/DR IN RCRD: CPT | Performed by: STUDENT IN AN ORGANIZED HEALTH CARE EDUCATION/TRAINING PROGRAM

## 2024-12-09 PROCEDURE — G8417 CALC BMI ABV UP PARAM F/U: HCPCS | Performed by: STUDENT IN AN ORGANIZED HEALTH CARE EDUCATION/TRAINING PROGRAM

## 2024-12-09 PROCEDURE — 1036F TOBACCO NON-USER: CPT | Performed by: STUDENT IN AN ORGANIZED HEALTH CARE EDUCATION/TRAINING PROGRAM

## 2024-12-09 PROCEDURE — 3078F DIAST BP <80 MM HG: CPT | Performed by: STUDENT IN AN ORGANIZED HEALTH CARE EDUCATION/TRAINING PROGRAM

## 2024-12-09 PROCEDURE — 1159F MED LIST DOCD IN RCRD: CPT | Performed by: STUDENT IN AN ORGANIZED HEALTH CARE EDUCATION/TRAINING PROGRAM

## 2024-12-09 PROCEDURE — 1123F ACP DISCUSS/DSCN MKR DOCD: CPT | Performed by: STUDENT IN AN ORGANIZED HEALTH CARE EDUCATION/TRAINING PROGRAM

## 2024-12-09 PROCEDURE — 99214 OFFICE O/P EST MOD 30 MIN: CPT | Performed by: STUDENT IN AN ORGANIZED HEALTH CARE EDUCATION/TRAINING PROGRAM

## 2024-12-09 PROCEDURE — 3074F SYST BP LT 130 MM HG: CPT | Performed by: STUDENT IN AN ORGANIZED HEALTH CARE EDUCATION/TRAINING PROGRAM

## 2024-12-09 PROCEDURE — G8484 FLU IMMUNIZE NO ADMIN: HCPCS | Performed by: STUDENT IN AN ORGANIZED HEALTH CARE EDUCATION/TRAINING PROGRAM

## 2024-12-09 PROCEDURE — G8427 DOCREV CUR MEDS BY ELIG CLIN: HCPCS | Performed by: STUDENT IN AN ORGANIZED HEALTH CARE EDUCATION/TRAINING PROGRAM

## 2024-12-09 RX ORDER — MOMETASONE FUROATE MONOHYDRATE 50 UG/1
2 SPRAY, METERED NASAL DAILY
Qty: 17 G | Refills: 3 | Status: SHIPPED | OUTPATIENT
Start: 2024-12-09

## 2024-12-09 RX ORDER — AMLODIPINE BESYLATE 10 MG/1
10 TABLET ORAL DAILY
Qty: 90 TABLET | Refills: 3 | Status: SHIPPED | OUTPATIENT
Start: 2024-12-09

## 2024-12-09 RX ORDER — ROSUVASTATIN CALCIUM 10 MG/1
10 TABLET, COATED ORAL DAILY
Qty: 90 TABLET | Refills: 3 | Status: SHIPPED | OUTPATIENT
Start: 2024-12-09

## 2024-12-09 SDOH — ECONOMIC STABILITY: FOOD INSECURITY: WITHIN THE PAST 12 MONTHS, THE FOOD YOU BOUGHT JUST DIDN'T LAST AND YOU DIDN'T HAVE MONEY TO GET MORE.: NEVER TRUE

## 2024-12-09 SDOH — ECONOMIC STABILITY: FOOD INSECURITY: WITHIN THE PAST 12 MONTHS, YOU WORRIED THAT YOUR FOOD WOULD RUN OUT BEFORE YOU GOT MONEY TO BUY MORE.: NEVER TRUE

## 2024-12-09 SDOH — ECONOMIC STABILITY: INCOME INSECURITY: HOW HARD IS IT FOR YOU TO PAY FOR THE VERY BASICS LIKE FOOD, HOUSING, MEDICAL CARE, AND HEATING?: NOT HARD AT ALL

## 2024-12-09 ASSESSMENT — PATIENT HEALTH QUESTIONNAIRE - PHQ9
SUM OF ALL RESPONSES TO PHQ QUESTIONS 1-9: 0
SUM OF ALL RESPONSES TO PHQ QUESTIONS 1-9: 0
1. LITTLE INTEREST OR PLEASURE IN DOING THINGS: NOT AT ALL
SUM OF ALL RESPONSES TO PHQ9 QUESTIONS 1 & 2: 0
SUM OF ALL RESPONSES TO PHQ QUESTIONS 1-9: 0
SUM OF ALL RESPONSES TO PHQ QUESTIONS 1-9: 0
2. FEELING DOWN, DEPRESSED OR HOPELESS: NOT AT ALL

## 2024-12-09 ASSESSMENT — ENCOUNTER SYMPTOMS
SHORTNESS OF BREATH: 0
COUGH: 0
CHEST TIGHTNESS: 0

## 2024-12-09 NOTE — PROGRESS NOTES
2024     Harinder Bush (:  1960) is a 64 y.o. male, here for evaluation of the following medical concerns:    HPI    Hypertension:  Home blood pressure monitoring: No. Patient denies chest pain, shortness of breath, headache, lightheadedness, blurred vision, peripheral edema, palpitations, dry cough, and fatigue.  Antihypertensive medication side effects: no medication side effects noted.  Use of agents associated with hypertension: none.                                        Sodium (mmol/L)   Date Value   2023 137    BUN (mg/dL)   Date Value   2023 10    Glucose (mg/dL)   Date Value   2023 109 (H)      Potassium (mmol/L)   Date Value   2023 3.7    Creatinine (mg/dL)   Date Value   2023 1.2         Hyperlipidemia:  No new myalgias or GI upset on rosuvastatin (Crestor).  Does not follow a low-fat diet.      Lab Results   Component Value Date    CHOL 152 2019    TRIG 57 2019    HDL 47 2023     Lab Results   Component Value Date    ALT 25 2023    AST 23 2023          Review of Systems   Constitutional:  Negative for fatigue.   Eyes:  Negative for visual disturbance.   Respiratory:  Negative for cough, chest tightness and shortness of breath.    Cardiovascular:  Negative for chest pain, palpitations and leg swelling.   Endocrine: Negative for polydipsia, polyphagia and polyuria.   Genitourinary:  Negative for frequency.   Skin:  Negative for rash.   Neurological:  Negative for dizziness, syncope, weakness and light-headedness.       Prior to Visit Medications    Medication Sig Taking? Authorizing Provider   mometasone (NASONEX) 50 MCG/ACT nasal spray 2 sprays by Nasal route daily Yes Francisco Queen,    amLODIPine (NORVASC) 10 MG tablet Take 1 tablet by mouth daily Yes Francisco Queen DO   rosuvastatin (CRESTOR) 10 MG tablet Take 1 tablet by mouth daily Yes Francisco Queen DO   tiZANidine (ZANAFLEX) 4 MG tablet TAKE 1 TABLET EVERY 8 HOURS AS

## 2024-12-10 ENCOUNTER — CLINICAL DOCUMENTATION (OUTPATIENT)
Dept: SPIRITUAL SERVICES | Age: 64
End: 2024-12-10

## 2024-12-10 NOTE — ACP (ADVANCE CARE PLANNING)
Advance Care Planning   Ambulatory ACP Specialist Patient Outreach    Date:  12/10/2024    ACP Specialist:  Germania Wilde    Outreach call to patient in follow-up to ACP Specialist referral from: Francisco Queen DO    [x] PCP  [] Provider   [] Ambulatory Care Management [] Other     For:                  [x] Advance Directive Assistance              [] Complete Portable DNR order              [] Complete POST/POLST/MOST              [] Code Status Discussion             [] Discuss Goals of Care             [] Early ACP Decision-Making              [x] Other (Specify) Needs to update current documents    Date Referral Received: 12/9/2024    Next Step:   [] ACP scheduled conversation  [x] Outreach again in one week               [x] Email / Mail ACP Info Sheets  [x] Email / Mail Advance Directive   [] Closing referral.  Routing closure to referring provider/staff and to ACP Specialist .    [] Closure letter mailed to patient with invitation to contact ACP Specialist if / when ready.   [] Other (Specify here):       [x] At this time, Healthcare Decision Maker Is:         Primary Decision Maker: Anil Chong - Brother/Sister - [phone]    Secondary Decision Maker: Sal Elliott - Other - [phone]    Supplemental (Other) Decision Maker: Sandra Elizondo - Other - [phone]      [x] Primary agent named in scanned advance directive.    [] Legal Next of Kin.     [] Unable to determine legal decision maker at this time.    Outreaches:       [x] 1st -  Date:  12/10/2024               Intervention:  [] Spoke with Patient   [x] Left Voice mail [x] Email / Mail    [] Zeenohhart  [] Other (Specify) :     Outcomes:   Writer attempted ACP outreach to the one number listed for both - patient's home and mobile ([phone]) - no answer.  Writer left voicemail requesting return call including call back number.  ACP outreach sent to patient's email address on file with a copy of [place] [employer] forms and ACP information

## 2024-12-23 ENCOUNTER — CLINICAL DOCUMENTATION (OUTPATIENT)
Dept: SPIRITUAL SERVICES | Age: 64
End: 2024-12-23

## 2024-12-23 NOTE — ACP (ADVANCE CARE PLANNING)
Advance Care Planning   Ambulatory ACP Specialist Patient Outreach    Date:  12/23/2024    ACP Specialist:  ROSA Ann    Outreach call to patient in follow-up to ACP Specialist referral from:Francisco Queen DO    [x] PCP  [] Provider   [] Ambulatory Care Management [] Other     For:                  [x] Advance Directive Assistance              [] Complete Portable DNR order              [] Complete POST/POLST/MOST              [] Code Status Discussion             [] Discuss Goals of Care             [] Early ACP Decision-Making              [x] Other (Specify):  Needs to update current documents     Date Referral Received:12/09/20245    Next Step:   [] ACP scheduled conversation  [] Outreach again in one week               [] Email / Mail ACP Info Sheets  [] Email / Mail Advance Directive   [x] Closing referral.  Routing closure to referring provider/staff and to ACP Specialist .    [] Closure letter mailed to patient with invitation to contact ACP Specialist if / when ready.   [] Other (Specify here):       [x] At this time, Healthcare Decision Maker Is:         [x] Primary agent named in scanned advance directive.    [] Legal Next of Kin.     [] Unable to determine legal decision maker at this time.    Outreaches:            [x]  Additional Outreach -  Date:  12/23/2024   (Specify Dates & special circumstances):    Outcomes: Placed call to [phone] (M) for today's scheduled ACP visit. Pt answered and SW introduced self, reason for call. Pt shared he does not wish to make any changes to current ACP documents on file and that he would like a copy emailed to him (confirmed email address on file). Sent this request to ACP Specialist TANVI Damon who assist pt with this document. Pt did not wish to make any changes to HCDM at this time. Referral to be closed.     Thank you for this referral.

## 2025-01-20 NOTE — ED NOTES
CT's and xrays in progress
I have reviewed discharge instructions with the patient. The patient verbalized understanding.   Patient armband removed and shredded
Report given to [facility]
rec'd pt report from RAMBO drummond. ..
Detail Level: Detailed
Quality 226: Preventive Care And Screening: Tobacco Use: Screening And Cessation Intervention: Patient screened for tobacco use and is an ex/non-smoker

## 2025-03-04 ENCOUNTER — TELEPHONE (OUTPATIENT)
Dept: PRIMARY CARE CLINIC | Age: 65
End: 2025-03-04

## 2025-03-04 DIAGNOSIS — L20.82 FLEXURAL ECZEMA: ICD-10-CM

## 2025-03-04 RX ORDER — TRIAMCINOLONE ACETONIDE OINTMENT USP, 0.05% 0.5 MG/G
OINTMENT TOPICAL 2 TIMES DAILY
Qty: 430 G | Refills: 0 | Status: SHIPPED | OUTPATIENT
Start: 2025-03-04

## 2025-03-04 NOTE — TELEPHONE ENCOUNTER
Medication:   Requested Prescriptions     Pending Prescriptions Disp Refills    triamcinolone (KENALOG) 0.05 % OINT ointment 430 g 0     Sig: Apply topically 2 times daily        Last Filled:    9/6/23  Patient Phone Number: [phone] (home)     Last appt: 12/9/2024   Next appt: 3/11/2025    Last OARRS:        No data to display

## 2025-03-04 NOTE — TELEPHONE ENCOUNTER
Pt called in to request refill sent to pharmacy attached below      triamcinolone (KENALOG) 0.1 % ointment [[id number]]  ENDED    Order Details  Dose: -- Route: Topical Frequency: 2 TIMES DAILY   Dispense Quantity: 80 g Refills: 0          Sig: APPLY TOPICALLY 2 TIMES DAILY FOR 7 DAYS         Start Date: 06/30/23 End Date: 07/07/23 after 14 doses   Written Date: 06/30/23 Expiration Date: 06/29/24   Original Order: triamcinolone (KENALOG) 0.1 % ointment [[id number]]   Providers    Authorizing Provider: Christel Norton DO NPI: [id number]   Ordering User: Christel Norton DO      [facility] [address] [place] [address] [phone] - F [phone] [[id number]]

## 2025-03-10 SDOH — ECONOMIC STABILITY: FOOD INSECURITY: WITHIN THE PAST 12 MONTHS, THE FOOD YOU BOUGHT JUST DIDN'T LAST AND YOU DIDN'T HAVE MONEY TO GET MORE.: NEVER TRUE

## 2025-03-10 SDOH — ECONOMIC STABILITY: INCOME INSECURITY: IN THE LAST 12 MONTHS, WAS THERE A TIME WHEN YOU WERE NOT ABLE TO PAY THE MORTGAGE OR RENT ON TIME?: NO

## 2025-03-10 SDOH — ECONOMIC STABILITY: TRANSPORTATION INSECURITY
IN THE PAST 12 MONTHS, HAS LACK OF TRANSPORTATION KEPT YOU FROM MEETINGS, WORK, OR FROM GETTING THINGS NEEDED FOR DAILY LIVING?: NO

## 2025-03-10 SDOH — HEALTH STABILITY: PHYSICAL HEALTH: ON AVERAGE, HOW MANY MINUTES DO YOU ENGAGE IN EXERCISE AT THIS LEVEL?: 70 MIN

## 2025-03-10 SDOH — ECONOMIC STABILITY: FOOD INSECURITY: WITHIN THE PAST 12 MONTHS, YOU WORRIED THAT YOUR FOOD WOULD RUN OUT BEFORE YOU GOT MONEY TO BUY MORE.: NEVER TRUE

## 2025-03-10 SDOH — HEALTH STABILITY: PHYSICAL HEALTH: ON AVERAGE, HOW MANY DAYS PER WEEK DO YOU ENGAGE IN MODERATE TO STRENUOUS EXERCISE (LIKE A BRISK WALK)?: 4 DAYS

## 2025-03-10 SDOH — ECONOMIC STABILITY: TRANSPORTATION INSECURITY
IN THE PAST 12 MONTHS, HAS THE LACK OF TRANSPORTATION KEPT YOU FROM MEDICAL APPOINTMENTS OR FROM GETTING MEDICATIONS?: NO

## 2025-03-10 ASSESSMENT — PATIENT HEALTH QUESTIONNAIRE - PHQ9
SUM OF ALL RESPONSES TO PHQ QUESTIONS 1-9: 0
SUM OF ALL RESPONSES TO PHQ QUESTIONS 1-9: 0
2. FEELING DOWN, DEPRESSED OR HOPELESS: NOT AT ALL
SUM OF ALL RESPONSES TO PHQ QUESTIONS 1-9: 0
1. LITTLE INTEREST OR PLEASURE IN DOING THINGS: NOT AT ALL
SUM OF ALL RESPONSES TO PHQ QUESTIONS 1-9: 0

## 2025-03-10 ASSESSMENT — LIFESTYLE VARIABLES
HOW OFTEN DO YOU HAVE A DRINK CONTAINING ALCOHOL: NEVER
HOW MANY STANDARD DRINKS CONTAINING ALCOHOL DO YOU HAVE ON A TYPICAL DAY: 0
HOW OFTEN DO YOU HAVE A DRINK CONTAINING ALCOHOL: 1
HOW MANY STANDARD DRINKS CONTAINING ALCOHOL DO YOU HAVE ON A TYPICAL DAY: PATIENT DOES NOT DRINK
HOW OFTEN DO YOU HAVE SIX OR MORE DRINKS ON ONE OCCASION: 1

## 2025-03-11 ENCOUNTER — TELEMEDICINE (OUTPATIENT)
Dept: PRIMARY CARE CLINIC | Age: 65
End: 2025-03-11

## 2025-03-11 DIAGNOSIS — Z00.00 MEDICARE ANNUAL WELLNESS VISIT, SUBSEQUENT: Primary | ICD-10-CM

## 2025-03-11 DIAGNOSIS — L20.82 FLEXURAL ECZEMA: ICD-10-CM

## 2025-03-11 DIAGNOSIS — Z12.11 SCREEN FOR COLON CANCER: ICD-10-CM

## 2025-03-11 RX ORDER — MOMETASONE FUROATE MONOHYDRATE 50 UG/1
2 SPRAY, METERED NASAL DAILY
Qty: 17 G | Refills: 3
Start: 2025-03-11

## 2025-03-11 RX ORDER — TRIAMCINOLONE ACETONIDE OINTMENT USP, 0.05% 0.5 MG/G
OINTMENT TOPICAL 2 TIMES DAILY
Qty: 430 G | Refills: 0
Start: 2025-03-11 | End: 2025-03-12 | Stop reason: SDUPTHER

## 2025-03-11 NOTE — PROGRESS NOTES
Medicare Annual Wellness Visit    Harinder Bush is here for Medicare AWV    Assessment & Plan   Medicare annual wellness visit, subsequent  Flexural eczema  The following orders have not been finalized:  -     triamcinolone (KENALOG) 0.05 % OINT ointment       No follow-ups on file.     Subjective     Patient's complete Health Risk Assessment and screening values have been reviewed and are found in Flowsheets. The following problems were reviewed today and where indicated follow up appointments were made and/or referrals ordered.    Positive Risk Factor Screenings with Interventions:                Abnormal BMI (obese):  There is no height or weight on file to calculate BMI. (!) Abnormal                   Objective    Patient-Reported Vitals  Patient-Reported Systolic (Top): 128 mmHg  Patient-Reported Diastolic (Bottom): 72 mmHg             No Known Allergies  Prior to Visit Medications    Medication Sig Taking? Authorizing Provider   triamcinolone (KENALOG) 0.05 % OINT ointment Apply topically 2 times daily Yes Francisco Queen DO   mometasone (NASONEX) 50 MCG/ACT nasal spray 2 sprays by Nasal route daily Yes Francisco Queen DO   amLODIPine (NORVASC) 10 MG tablet Take 1 tablet by mouth daily Yes Francisco Queen DO   rosuvastatin (CRESTOR) 10 MG tablet Take 1 tablet by mouth daily Yes Francisco Queen DO   tiZANidine (ZANAFLEX) 4 MG tablet TAKE 1 TABLET EVERY 8 HOURS AS NEEDED (BACK PAIN) Yes Francisco Queen DO   Omega-3 Fatty Acids (FISH OIL) 500 MG CAPS Take by mouth 4 times daily Yes Freddie Abbott MD   Multiple Vitamins-Minerals (THERAPEUTIC MULTIVITAMIN-MINERALS) tablet Take 1 tablet by mouth daily Yes Freddie Abbott MD   BLACK CURRANT SEED OIL PO Take by mouth Yes Freddie Abbott MD   vitamin E 1000 units capsule Take 1 capsule by mouth daily Yes Freddie Abbott MD   ascorbic acid (VITAMIN C) 100 MG tablet Take 1 tablet by mouth daily Yes Freddie Abbott MD   tadalafil (CIALIS) 10 MG tablet

## 2025-03-12 ENCOUNTER — TELEPHONE (OUTPATIENT)
Dept: PRIMARY CARE CLINIC | Age: 65
End: 2025-03-12

## 2025-03-12 DIAGNOSIS — L20.82 FLEXURAL ECZEMA: ICD-10-CM

## 2025-03-12 RX ORDER — TRIAMCINOLONE ACETONIDE OINTMENT USP, 0.05% 0.5 MG/G
OINTMENT TOPICAL 2 TIMES DAILY
Qty: 430 G | Refills: 0 | Status: SHIPPED | OUTPATIENT
Start: 2025-03-12

## 2025-03-12 NOTE — TELEPHONE ENCOUNTER
Pt said insurance is no longer covering   triamcinolone (KENALOG) 0.05 % OINT ointment     Can something else be called into the [employer] MAIL ORDER

## 2025-03-14 ENCOUNTER — TELEPHONE (OUTPATIENT)
Dept: ADMINISTRATIVE | Age: 65
End: 2025-03-14

## 2025-03-14 NOTE — TELEPHONE ENCOUNTER
Submitted PA for Triamcinolone Acetonide 0.05% ointment   Via [facility] (Key: [id number]): ARCHIVED.     The requested medication is not covered under your Medicaid pharmacy benefit. It is covered under Medicare Part D pharmacy benefit. Please re-submit claim to bill Medicare Part D     If this requires a response please respond to the pool ( P MHCX PSC MEDICATION PRE-AUTH).      Thank you please advise patient and pharmacy

## 2025-03-17 RX ORDER — TRIAMCINOLONE ACETONIDE 0.25 MG/G
1 CREAM TOPICAL 2 TIMES DAILY
Qty: 80 G | Refills: 1 | Status: SHIPPED | OUTPATIENT
Start: 2025-03-17

## 2025-03-17 RX ORDER — TRIAMCINOLONE ACETONIDE 0.25 MG/G
CREAM TOPICAL 2 TIMES DAILY
Qty: 80 G | Refills: 1 | Status: SHIPPED | OUTPATIENT
Start: 2025-03-17 | End: 2025-03-17

## 2025-03-17 NOTE — TELEPHONE ENCOUNTER
Called pt pharmacy let them know the below message an was told the below medication is still coming back rejected not on formulary preferred alt is  Triamcinolone Acetonide 0.025 % ointment and betamethasone 0.5 cream is covered

## 2025-03-28 ENCOUNTER — CLINICAL DOCUMENTATION (OUTPATIENT)
Dept: SPIRITUAL SERVICES | Age: 65
End: 2025-03-28

## 2025-03-28 NOTE — ACP (ADVANCE CARE PLANNING)
Advance Care Planning    Advance Care Planning Clinical Specialist  Conversation Note    Date of ACP Conversation: 3/28/2025    ACP Clinical Specialist: ROSA Ann    Referral Date:03/25/2025    Received by: Self-Referral    Conversation Conducted with: Patient with decision making capacity      Current Designated Decision Maker/s:    Primary Decision Maker: Dulce Carvajal - Other - [phone]    Secondary Decision Maker: TommyjessicaJonathan - Brother/Sister - [phone]    Supplemental (Other) Decision Maker: MikhailSandra fields - Other - [phone]      Care Preferences Communicated    Code Status:  If the patient's heart were to stop beating, in their present state of health, the patient's CPR Preference: Yes, attempt CPR and at this time pt remains FULL CODE.    If their health worsens and it becomes clear that the chance of recovery is unlikely, the patient's CPR Preference: Pt shared he does not wish for CPR, vent to continue if in a terminal, irreversible condition.       Ventilator:  If the patient, in their present state of health, suddenly became very ill and unable to breathe on their own, the patient desires the use of a ventilator (intubation).    If their health worsens and it becomes clear that the chance of recovery is unlikely, the patient does not desire the use of a ventilator (intubation). and pt shared \"if I am not recovering after one day-I want them to take me off that machine.\"    [x] Yes  [] No   Educated Patient / Decision Maker regarding differences between advance directives and portable DNR orders.    Conversation Summary:   Met with pt and updated ACP documents to add new healthcare agents. Pt shared his current healthcare agents are not well and he does not wish to \"burden them with this.\" Documents were completed and pt signed via [employer]. Placed copy in record, updated HCDM to align with new document (inactivated previous healthcare agents) and will mail pt thee (3) copies

## 2025-05-07 NOTE — TELEPHONE ENCOUNTER
Medication:   Requested Prescriptions     Pending Prescriptions Disp Refills    tiZANidine (ZANAFLEX) 4 MG tablet [Pharmacy Med Name: tiZANidine HCl Oral Tablet 4 MG] 90 tablet 11     Sig: TAKE 1 TABLET EVERY 8 HOURS AS NEEDED (BACK PAIN)        Last Filled:  5/14/24    Patient Phone Number: [phone] (home)     Last appt: 3/11/2025   Next appt: 6/9/2025    Last OARRS:        No data to display

## 2025-06-24 ENCOUNTER — OFFICE VISIT (OUTPATIENT)
Dept: PRIMARY CARE CLINIC | Age: 65
End: 2025-06-24

## 2025-06-24 VITALS
DIASTOLIC BLOOD PRESSURE: 80 MMHG | TEMPERATURE: 97.2 F | SYSTOLIC BLOOD PRESSURE: 138 MMHG | HEIGHT: 73 IN | BODY MASS INDEX: 32.63 KG/M2 | WEIGHT: 246.2 LBS | HEART RATE: 80 BPM | OXYGEN SATURATION: 98 %

## 2025-06-24 DIAGNOSIS — L20.82 FLEXURAL ECZEMA: Primary | ICD-10-CM

## 2025-06-24 DIAGNOSIS — E66.811 CLASS 1 OBESITY DUE TO EXCESS CALORIES WITH SERIOUS COMORBIDITY AND BODY MASS INDEX (BMI) OF 32.0 TO 32.9 IN ADULT: ICD-10-CM

## 2025-06-24 DIAGNOSIS — Z21 ASYMPTOMATIC HIV INFECTION, WITH NO HISTORY OF HIV-RELATED ILLNESS (HCC): ICD-10-CM

## 2025-06-24 DIAGNOSIS — E78.00 PURE HYPERCHOLESTEROLEMIA: ICD-10-CM

## 2025-06-24 DIAGNOSIS — I10 ESSENTIAL HYPERTENSION: Primary | ICD-10-CM

## 2025-06-24 DIAGNOSIS — E66.09 CLASS 1 OBESITY DUE TO EXCESS CALORIES WITH SERIOUS COMORBIDITY AND BODY MASS INDEX (BMI) OF 32.0 TO 32.9 IN ADULT: ICD-10-CM

## 2025-06-24 PROBLEM — E04.9 GOITER: Status: RESOLVED | Noted: 2017-01-20 | Resolved: 2025-06-24

## 2025-06-24 RX ORDER — TRIAMCINOLONE ACETONIDE 0.25 MG/G
1 CREAM TOPICAL 2 TIMES DAILY
Qty: 80 G | Refills: 1 | Status: SHIPPED | OUTPATIENT
Start: 2025-06-24

## 2025-06-24 RX ORDER — ROSUVASTATIN CALCIUM 10 MG/1
10 TABLET, COATED ORAL DAILY
Qty: 90 TABLET | Refills: 3 | Status: SHIPPED | OUTPATIENT
Start: 2025-06-24

## 2025-06-24 RX ORDER — AMLODIPINE BESYLATE 10 MG/1
10 TABLET ORAL DAILY
Qty: 90 TABLET | Refills: 3 | Status: SHIPPED | OUTPATIENT
Start: 2025-06-24

## 2025-06-24 RX ORDER — TRIAMCINOLONE ACETONIDE 0.25 MG/G
OINTMENT TOPICAL 2 TIMES DAILY
COMMUNITY
End: 2025-06-24

## 2025-06-24 RX ORDER — TRIAMCINOLONE ACETONIDE 0.25 MG/G
OINTMENT TOPICAL 2 TIMES DAILY
Qty: 80 G | Refills: 1 | Status: CANCELLED | OUTPATIENT
Start: 2025-06-24

## 2025-06-24 RX ORDER — BICTEGRAVIR SODIUM, EMTRICITABINE, AND TENOFOVIR ALAFENAMIDE FUMARATE 50; 200; 25 MG/1; MG/1; MG/1
1 TABLET ORAL DAILY
Qty: 30 TABLET | Refills: 0
Start: 2025-06-24

## 2025-06-24 ASSESSMENT — ENCOUNTER SYMPTOMS
COUGH: 0
SHORTNESS OF BREATH: 0
CHEST TIGHTNESS: 0

## 2025-06-24 NOTE — PROGRESS NOTES
2025     Harinder Bush (:  1960) is a 64 y.o. male, here for evaluation of the following medical concerns:    HPI    Hypertension:  Home blood pressure monitoring: No. Patient denies chest pain, shortness of breath, headache, lightheadedness, blurred vision, peripheral edema, palpitations, dry cough, and fatigue.  Antihypertensive medication side effects: no medication side effects noted.  Use of agents associated with hypertension: none.                                        Sodium (mmol/L)   Date Value   2024 144    BUN (mg/dL)   Date Value   2024 12    Glucose (mg/dL)   Date Value   2024 105 (H)      Potassium (mmol/L)   Date Value   2024 4.1    Creatinine (mg/dL)   Date Value   2024 1.1         Hyperlipidemia:  No new myalgias or GI upset on rosuvastatin (Crestor).  Does not follow a low-fat diet.      Lab Results   Component Value Date    CHOL 152 2019    TRIG 57 2019    HDL 51 2024     Lab Results   Component Value Date    ALT 23 2024    AST 19 2024          Review of Systems   Constitutional:  Negative for fatigue.   Eyes:  Negative for visual disturbance.   Respiratory:  Negative for cough, chest tightness and shortness of breath.    Cardiovascular:  Negative for chest pain, palpitations and leg swelling.   Endocrine: Negative for polydipsia, polyphagia and polyuria.   Genitourinary:  Negative for frequency.   Skin:  Negative for rash.   Neurological:  Negative for dizziness, syncope, weakness and light-headedness.       Prior to Visit Medications    Medication Sig Taking? Authorizing Provider   amLODIPine (NORVASC) 10 MG tablet Take 1 tablet by mouth daily Yes Francisco Queen DO   rosuvastatin (CRESTOR) 10 MG tablet Take 1 tablet by mouth daily Yes Francisco Queen DO   BIKTARVY -25 MG TABS per tablet Take 1 tablet by mouth daily Yes Francisco Queen DO   tiZANidine (ZANAFLEX) 4 MG tablet TAKE 1 TABLET EVERY 8 HOURS AS NEEDED

## 2025-06-24 NOTE — TELEPHONE ENCOUNTER
Medication:   Requested Prescriptions     Pending Prescriptions Disp Refills    triamcinolone (KENALOG) 0.025 % ointment 80 g 1     Sig: Apply topically 2 times daily Apply topically 2 times daily.        Last Filled:    Per message from pharmacy in March, this ointment is the one that is covered.    Patient Phone Number: [phone] (home)     Last appt: 6/24/2025   Next appt: 11/4/2025    Last OARRS:        No data to display                  
DISPLAY PLAN FREE TEXT